# Patient Record
Sex: FEMALE | Race: WHITE | Employment: OTHER | URBAN - METROPOLITAN AREA
[De-identification: names, ages, dates, MRNs, and addresses within clinical notes are randomized per-mention and may not be internally consistent; named-entity substitution may affect disease eponyms.]

---

## 2017-01-04 ENCOUNTER — ANESTHESIA (OUTPATIENT)
Dept: GASTROENTEROLOGY | Facility: CLINIC | Age: 67
End: 2017-01-04
Payer: MEDICARE

## 2017-01-04 ENCOUNTER — SURGERY (OUTPATIENT)
Age: 67
End: 2017-01-04

## 2017-01-04 PROCEDURE — 25000125 ZZHC RX 250: Performed by: NURSE ANESTHETIST, CERTIFIED REGISTERED

## 2017-01-04 RX ORDER — PROPOFOL 10 MG/ML
INJECTION, EMULSION INTRAVENOUS CONTINUOUS PRN
Status: DISCONTINUED | OUTPATIENT
Start: 2017-01-04 | End: 2017-01-04

## 2017-01-04 RX ORDER — PROPOFOL 10 MG/ML
INJECTION, EMULSION INTRAVENOUS PRN
Status: DISCONTINUED | OUTPATIENT
Start: 2017-01-04 | End: 2017-01-04

## 2017-01-04 RX ADMIN — PROPOFOL 50 MG: 10 INJECTION, EMULSION INTRAVENOUS at 10:49

## 2017-01-04 RX ADMIN — PROPOFOL 50 MG: 10 INJECTION, EMULSION INTRAVENOUS at 10:48

## 2017-01-04 RX ADMIN — PROPOFOL 200 MCG/KG/MIN: 10 INJECTION, EMULSION INTRAVENOUS at 10:49

## 2017-01-19 ENCOUNTER — OFFICE VISIT (OUTPATIENT)
Dept: FAMILY MEDICINE | Facility: CLINIC | Age: 67
End: 2017-01-19
Payer: COMMERCIAL

## 2017-01-19 VITALS
SYSTOLIC BLOOD PRESSURE: 108 MMHG | DIASTOLIC BLOOD PRESSURE: 62 MMHG | WEIGHT: 143.6 LBS | BODY MASS INDEX: 24.52 KG/M2 | HEART RATE: 72 BPM | TEMPERATURE: 98.2 F | HEIGHT: 64 IN

## 2017-01-19 DIAGNOSIS — Z01.818 PREOP GENERAL PHYSICAL EXAM: Primary | ICD-10-CM

## 2017-01-19 DIAGNOSIS — H26.9 CATARACT: ICD-10-CM

## 2017-01-19 PROCEDURE — 99214 OFFICE O/P EST MOD 30 MIN: CPT | Performed by: NURSE PRACTITIONER

## 2017-01-19 NOTE — MR AVS SNAPSHOT
After Visit Summary   1/19/2017    Karen Hernandez    MRN: [id number]           Patient Information     Date Of Birth          1950        Visit Information        Provider Department      1/19/2017 8:20 AM Sania Sr APRN [facility]        Today's Diagnoses     Preop general physical exam    -  1     Cataract           Care Instructions      Before Your Surgery      Call your surgeon if there is any change in your health. This includes signs of a cold or flu (such as a sore throat, runny nose, cough, rash or fever).    Do not smoke, drink alcohol or take over the counter medicine (unless your surgeon or primary care doctor tells you to) for the 24 hours before and after surgery.    If you take prescribed drugs: Follow your doctor s orders about which medicines to take and which to stop until after surgery.    Eating and drinking prior to surgery: follow the instructions from your surgeon    Take a shower or bath the night before surgery. Use the soap your surgeon gave you to gently clean your skin. If you do not have soap from your surgeon, use your regular soap. Do not shave or scrub the surgery site.  Wear clean pajamas and have clean sheets on your bed.       Be well rested and well hydrated the night before surgery         Follow-ups after your visit        Who to contact     Normal or non-critical lab and imaging results will be communicated to you by [employer], letter or phone within 4 business days after the clinic has received the results. If you do not hear from us within 7 days, please contact the clinic through [employer] or phone. If you have a critical or abnormal lab result, we will notify you by phone as soon as possible.  Submit refill requests through [facility] or call your pharmacy and they will forward the refill request to us. Please allow 3 business days for your refill to be completed.          If you need to speak with a  for  "additional information , please call: [phone]           Additional Information About Your Visit        [employer] Information     [employer] gives you secure access to your electronic health record. If you see a primary care provider, you can also send messages to your care team and make appointments. If you have questions, please call your primary care clinic.  If you do not have a primary care provider, please call [phone] and they will assist you.        Care EveryWhere ID     This is your Care EveryWhere ID. This could be used by other organizations to access your [facility] medical records  [id number]        Your Vitals Were     Pulse Temperature Height BMI (Body Mass Index)          72 98.2  F (36.8  C) (Tympanic) 5' 3.75\" (1.619 m) 24.85 kg/m2         Blood Pressure from Last 3 Encounters:   01/19/17 108/62   01/04/17 100/66   12/13/16 118/70    Weight from Last 3 Encounters:   01/19/17 143 lb 9.6 oz (65.137 kg)   01/04/17 140 lb (63.504 kg)   12/13/16 140 lb 3.2 oz (63.594 kg)              Today, you had the following     No orders found for display       Primary Care Provider Office Phone # Fax #    [facility] [phone]       [address] [address]        Thank you!     Thank you for choosing [facility]  for your care. Our goal is always to provide you with excellent care. Hearing back from our patients is one way we can continue to improve our services. Please take a few minutes to complete the written survey that you may receive in the mail after your visit with us. Thank you!             Your Updated Medication List - Protect others around you: Learn how to safely use, store and throw away your medicines at www.disposemymeds.org.          This list is accurate as of: 1/19/17  8:52 AM.  Always use your most recent med list.                   Brand Name Dispense Instructions for use    clobetasol 0.05 % cream "    TEMOVATE    30 g    Apply topically 2 times daily       lisinopril 20 MG tablet    PRINIVIL/ZESTRIL    90 tablet    Take 1 tablet (20 mg) by mouth daily       pantoprazole 40 MG EC tablet    PROTONIX    90 tablet    Take 1 tablet (40 mg) by mouth daily       selenium sulfide 2.5 % lotion    SELSUN    120 mL    Apply  topically daily as needed for itching.       triamcinolone 0.1 % cream    KENALOG    15 g    Apply topically 3 times daily       trifluridine 1 % ophthalmic solution    VIROPTIC    1 Bottle    Place 1 drop into the right eye every 2 hours       VALTREX 1000 mg tablet   Generic drug:  valACYclovir     12 tablet    Take 2 tablets (2,000 mg) by mouth 2 times daily Dispense as written

## 2017-01-19 NOTE — PATIENT INSTRUCTIONS
Before Your Surgery      Call your surgeon if there is any change in your health. This includes signs of a cold or flu (such as a sore throat, runny nose, cough, rash or fever).    Do not smoke, drink alcohol or take over the counter medicine (unless your surgeon or primary care doctor tells you to) for the 24 hours before and after surgery.    If you take prescribed drugs: Follow your doctor s orders about which medicines to take and which to stop until after surgery.    Eating and drinking prior to surgery: follow the instructions from your surgeon    Take a shower or bath the night before surgery. Use the soap your surgeon gave you to gently clean your skin. If you do not have soap from your surgeon, use your regular soap. Do not shave or scrub the surgery site.  Wear clean pajamas and have clean sheets on your bed.       Be well rested and well hydrated the night before surgery

## 2017-01-19 NOTE — NURSING NOTE
"Chief Complaint   Patient presents with     Pre-Op Exam       Initial /62 mmHg  Pulse 72  Temp(Src) 98.2  F (36.8  C) (Tympanic)  Ht 5' 3.75\" (1.619 m)  Wt 143 lb 9.6 oz (65.137 kg)  BMI 24.85 kg/m2 Estimated body mass index is 24.85 kg/(m^2) as calculated from the following:    Height as of this encounter: 5' 3.75\" (1.619 m).    Weight as of this encounter: 143 lb 9.6 oz (65.137 kg).  BP completed using cuff size: saida Gaspar CMA (AAMA)      "

## 2017-01-19 NOTE — PROGRESS NOTES
[facility]  [address] [place] [id number]  [phone]  Dept: [phone]    PRE-OP EVALUATION:  Today's date: 2017    Karen Hernandez (: 1950) presents for pre-operative evaluation assessment as requested by Dr. Flor.  She requires evaluation and anesthesia risk assessment prior to undergoing surgery/procedure for treatment of cataract of left eye , and right eye .  Proposed procedure: cataract removal with IOL implant of left eye and right eye.      Date of Surgery/ Procedure: 17  Time of Surgery/ Procedure: 0630AM  Hospital/Surgical Facility: [facility]  Fax number for surgical facility:   Primary Physician: Sania Sr  Type of Anesthesia Anticipated: to be determined    Patient has a Health Care Directive or Living Will:  NO    1. NO - Do you have a history of heart attack, stroke, stent, bypass or surgery on an artery in the head, neck, heart or legs?  2. NO - Do you ever have any pain or discomfort in your chest?  3. NO - Do you have a history of  Heart Failure?  4. NO - Are you troubled by shortness of breath when: walking on the level, up a slight hill or at night?  5. NO - Do you currently have a cold, bronchitis or other respiratory infection ?  6. YES - Do you have a cough, shortness of breath or wheezing? Dry cough  7. NO - Do you sometimes get pains in the calves of your legs when you walk?  8. NO - Do you or anyone in your family have previous history of blood clots?  9. NO - Do you or does anyone in your family have a serious bleeding problem such as prolonged bleeding following surgeries or cuts?  10. NO - Have you ever had problems with anemia or been told to take iron pills?  11. NO - Have you had any abnormal blood loss such as black, tarry or bloody stools, or abnormal vaginal bleeding?  12. NO - Have you ever had a blood transfusion?  13. NO - Have you or any of your relatives ever had problems with anesthesia?  14. NO  - Do you have sleep apnea, excessive snoring or daytime drowsiness?  15. NO - Do you have any prosthetic heart valves?  16. NO - Do you have prosthetic joints?  17. NO - Is there any chance that you may be pregnant?      HPI:                                                      Brief HPI related to upcoming procedure: bilat cataract   Will be having the left eye done first .       See problem list for active medical problems.  Problems all longstanding and stable, except as noted/documented.  See ROS for pertinent symptoms related to these conditions.                                                                                                  .    MEDICAL HISTORY:                                                      Patient Active Problem List    Diagnosis Date Noted     FHX COLON CANCER - SCOPE Q 5Y 05/17/2007     Priority: High     2007 negative.        Elevated glucose 08/03/2016     Priority: Medium     Osteopenia 07/26/2016     Priority: Medium     Essential hypertension with goal blood pressure less than 140/90 06/15/2016     Priority: Medium     Gastroesophageal reflux disease with esophagitis 11/22/2015     Priority: Medium     Loss of height 08/08/2013     Priority: Medium     Dermatitis 08/08/2013     Priority: Medium     Herpes simplex of eye 05/20/2011     Priority: Medium     Recurrent cold sores 04/29/2011     Priority: Medium     AK (actinic keratosis) 04/14/2011     Priority: Medium     Melanocytic nevus 04/14/2011     Priority: Medium     (Problem list name updated by automated process. Provider to review and confirm.)       Hyperlipidemia LDL goal <130 09/29/2008     Priority: Medium     September 29, 2008 - Desires lifestyle initially.  Recheck 3-6mo.          Advanced directives,HC info letter sent 2/28/2012 02/28/2012     Priority: Low     24 hour contact handout given 02/23/2012     Priority: Low     EMERGENCY CARE PLAN  Presenting Problem Signs and Symptoms Treatment Plan    Questions or  conerns during clinic hours    I will call the clinic directly     Questions or conerns outside clinic hours    I will call the 24 hour nurse line at [phone]    Patient needs to schedule an appointment    I will call the 24 hour scheduling team at [phone] or clinic directly    Same day treatment     I will call the clinic first, nurse line if after hours, urgent care and express care if needed             Past Medical History   Diagnosis Date     NO ACTIVE PROBLEMS      AK (actinic keratosis) 4/14/2011     Moles 4/14/2011     FHX COLON CANCER - SCOPE Q 5Y 5/17/2007 2007 negative.      Hyperlipidemia LDL goal <130 9/29/2008 September 29, 2008 - Desires lifestyle initially.  Recheck 3-6mo.       Herpes simplex of eye 5/20/2011     GERD (gastroesophageal reflux disease) 5/20/2011     Past Surgical History   Procedure Laterality Date     C appendectomy  1960     Tonsillectomy  1958     Hysterectomy, pap no longer indicated  2000     Ovaries left     Colonoscopy  8/10/2012     Procedure: COLONOSCOPY;  Colonoscopy;  Surgeon: Carlos Alatorre MD;  Location: [facility]     Colonoscopy N/A 1/4/2017     Procedure: COLONOSCOPY;  Surgeon: Stepan Samayoa MD;  Location: [facility]     Current Outpatient Prescriptions   Medication Sig Dispense Refill     lisinopril (PRINIVIL,ZESTRIL) 20 MG tablet Take 1 tablet (20 mg) by mouth daily 90 tablet 3     pantoprazole (PROTONIX) 40 MG enteric coated tablet Take 1 tablet (40 mg) by mouth daily 90 tablet 3     triamcinolone (KENALOG) 0.1 % cream Apply topically 3 times daily 15 g 0     selenium sulfide (SELSUN) 2.5 % shampoo Apply  topically daily as needed for itching. 120 mL 0     trifluridine (VIROPTIC) 1 % ophthalmic solution Place 1 drop into the right eye every 2 hours 1 Bottle 0     VALTREX 1 G tablet Take 2 tablets (2,000 mg) by mouth 2 times daily Dispense as written 12 tablet 1     clobetasol (TEMOVATE) 0.05 % cream Apply topically 2 times daily 30 g 0  "    OTC products: no recent use of OTC ASA, NSAIDS or Steroids    Allergies   Allergen Reactions     Nkda [No Known Drug Allergies]       Latex Allergy: NO    Social History   Substance Use Topics     Smoking status: Never Smoker      Smokeless tobacco: Never Used     Alcohol Use: Yes      Comment: Social-- 6/wk     History   Drug Use No       REVIEW OF SYSTEMS:                                                    C: NEGATIVE for fever, chills, change in weight  E/M: NEGATIVE for ear, mouth and throat problems  R: NEGATIVE for significant cough or SOB  CV: NEGATIVE for chest pain, palpitations or peripheral edema  GI: NEGATIVE for nausea, abdominal pain, heartburn, or change in bowel habits  : no  UTI symptoms   NEURO: NEGATIVE for weakness, dizziness or paresthesias  PSYCHIATRIC: NEGATIVE for changes in mood or affect    EXAM:                                                    /62 mmHg  Pulse 72  Temp(Src) 98.2  F (36.8  C) (Tympanic)  Ht 5' 3.75\" (1.619 m)  Wt 143 lb 9.6 oz (65.137 kg)  BMI 24.85 kg/m2  GENERAL APPEARANCE: healthy, alert and no distress  EYES: Eyes grossly normal to inspection, PERRL, conjunctivae and sclerae normal and eyes track well;   HENT: ear canals and TM's normal, nose and mouth without ulcers or lesions, oral mucous membranes moist, oropharynx clear   No sinus pain and teeth in good repair   RESP: lungs clear to auscultation - no rales, rhonchi or wheezes  CV: regular rate and rhythm, normal S1 S2, no S3 or S4 and no murmur, click or rub   ABDOMEN: soft, nontender, no HSM or masses and bowel sounds normal  SKIN: no suspicious lesions or rashes  NEURO: Normal strength and tone, mentation intact, speech normal, cranial nerves 2-12 intact, Romberg negative, rapid alternating movements normal, proprioception normal and normal strength throughout  PSYCH: mentation appears normal and affect normal/bright    DIAGNOSTICS:                                                    No labs or " EKG required for low risk surgery (cataract, skin procedure, breast biopsy, etc)    Recent Labs   Lab Test  07/26/16   0858  08/25/14   0839  08/08/13   0830   06/08/12   1234  05/20/11   1054  10/15/09   0850   HGB   --    --    --    --   14.3  13.3  13.8   PLT   --    --    --    --   243   --   258   NA  142   --   142   < >  143  142  142   POTASSIUM  4.1   --   4.6   < >  4.4  4.5  4.1   CR  0.77   --   0.70   < >  0.76  0.75  0.81   A1C   --   5.8   --    --    --    --    --     < > = values in this interval not displayed.        IMPRESSION:                                                    Reason for surgery/procedure:  treatment of cataract of left eye , and right eye .  Proposed procedure: cataract removal with IOL implant of left eye and right eye.        The proposed surgical procedure is considered LOW risk.    REVISED CARDIAC RISK INDEX  The patient has the following serious cardiovascular risks for perioperative complications such as (MI, PE, VFib and 3  AV Block):  No serious cardiac risks  INTERPRETATION: 0 risks: Class I (very low risk - 0.4% complication rate)    The patient has the following additional risks for perioperative complications:  No identified additional risks    ASSESSMENT/PLAN:      ICD-10-CM    1. Preop general physical exam Z01.818    2. Cataract H26.9        Patient Instructions     Before Your Surgery      Call your surgeon if there is any change in your health. This includes signs of a cold or flu (such as a sore throat, runny nose, cough, rash or fever).    Do not smoke, drink alcohol or take over the counter medicine (unless your surgeon or primary care doctor tells you to) for the 24 hours before and after surgery.    If you take prescribed drugs: Follow your doctor s orders about which medicines to take and which to stop until after surgery.    Eating and drinking prior to surgery: follow the instructions from your surgeon    Take a shower or bath the night before  surgery. Use the soap your surgeon gave you to gently clean your skin. If you do not have soap from your surgeon, use your regular soap. Do not shave or scrub the surgery site.  Wear clean pajamas and have clean sheets on your bed.       Be well rested and well hydrated the night before surgery               RECOMMENDATIONS:                                                          --Patient is to take all scheduled medications on the day of surgery EXCEPT for modifications listed below.    APPROVAL GIVEN to proceed with proposed procedure, without further diagnostic evaluation       Signed Electronically by: AGUS BARR NP, APRN CNP    Copy of this evaluation report is provided to requesting physician.    [facility] Preop Guidelines

## 2017-02-27 ENCOUNTER — OFFICE VISIT (OUTPATIENT)
Dept: FAMILY MEDICINE | Facility: CLINIC | Age: 67
End: 2017-02-27
Payer: COMMERCIAL

## 2017-02-27 VITALS — HEART RATE: 100 BPM | DIASTOLIC BLOOD PRESSURE: 62 MMHG | SYSTOLIC BLOOD PRESSURE: 116 MMHG | TEMPERATURE: 98.6 F

## 2017-02-27 DIAGNOSIS — R05.9 COUGH: ICD-10-CM

## 2017-02-27 DIAGNOSIS — D22.5 MELANOCYTIC NEVUS OF TRUNK: Primary | ICD-10-CM

## 2017-02-27 DIAGNOSIS — L82.1 KERATOSIS, SEBORRHEIC: ICD-10-CM

## 2017-02-27 PROCEDURE — 17110 DESTRUCTION B9 LES UP TO 14: CPT | Performed by: NURSE PRACTITIONER

## 2017-02-27 PROCEDURE — 11100 ZZHC BIOPSY SKIN/SUBQ/MUC MEM, SINGLE LESION: CPT | Mod: 59 | Performed by: NURSE PRACTITIONER

## 2017-02-27 PROCEDURE — 88305 TISSUE EXAM BY PATHOLOGIST: CPT | Performed by: NURSE PRACTITIONER

## 2017-02-27 PROCEDURE — 99213 OFFICE O/P EST LOW 20 MIN: CPT | Mod: 25 | Performed by: NURSE PRACTITIONER

## 2017-02-27 NOTE — PROGRESS NOTES
SUBJECTIVE:                                                    Karen Hernandez is a 66 year old female who presents to clinic today for the following health issues:      Does have multiple keratosis and 1 mole on the upper right back    On the upper abdomin    Does have a harsh cough has had off and on for 3 months.  Will happen about every day it is very sporadic. Does have hx of  Reflux . Has been eating more causative foods.            Problem list and histories reviewed & adjusted, as indicated.  Additional history: as documented    Patient Active Problem List   Diagnosis     FHX COLON CANCER - SCOPE Q 5Y     Hyperlipidemia LDL goal <130     AK (actinic keratosis)     Melanocytic nevus     Recurrent cold sores     Herpes simplex of eye     24 hour contact handout given     Advanced directives,HC info letter sent 2/28/2012     Loss of height     Dermatitis     Gastroesophageal reflux disease with esophagitis     Essential hypertension with goal blood pressure less than 140/90     Osteopenia     Elevated glucose     Past Surgical History   Procedure Laterality Date     C appendectomy  1960     Tonsillectomy  1958     Hysterectomy, pap no longer indicated  2000     Ovaries left     Colonoscopy  8/10/2012     Procedure: COLONOSCOPY;  Colonoscopy;  Surgeon: Carlos Alatorre MD;  Location: [facility]     Colonoscopy N/A 1/4/2017     Procedure: COLONOSCOPY;  Surgeon: Stepan Samayoa MD;  Location: [facility]     Eye surgery  2017       Social History   Substance Use Topics     Smoking status: Never Smoker     Smokeless tobacco: Never Used     Alcohol use Yes      Comment: Social-- 6/wk     Family History   Problem Relation Age of Onset     Cancer - colorectal Father      CANCER Daughter      Breast Cancer Daughter      DIABETES No family hx of      Hypertension No family hx of      CEREBROVASCULAR DISEASE No family hx of      Prostate Cancer No family hx of          Current Outpatient Prescriptions   Medication  Sig Dispense Refill     lisinopril (PRINIVIL,ZESTRIL) 20 MG tablet Take 1 tablet (20 mg) by mouth daily 90 tablet 3     pantoprazole (PROTONIX) 40 MG enteric coated tablet Take 1 tablet (40 mg) by mouth daily 90 tablet 3     triamcinolone (KENALOG) 0.1 % cream Apply topically 3 times daily 15 g 0     clobetasol (TEMOVATE) 0.05 % cream Apply topically 2 times daily 30 g 0     selenium sulfide (SELSUN) 2.5 % shampoo Apply  topically daily as needed for itching. 120 mL 0     VALTREX 1 G tablet Take 2 tablets (2,000 mg) by mouth 2 times daily Dispense as written (Patient not taking: Reported on 2/27/2017) 12 tablet 1     Allergies   Allergen Reactions     Nkda [No Known Drug Allergies]      BP Readings from Last 3 Encounters:   02/27/17 116/62   01/19/17 108/62   01/04/17 100/66    Wt Readings from Last 3 Encounters:   01/19/17 143 lb 9.6 oz (65.1 kg)   01/04/17 140 lb (63.5 kg)   12/13/16 140 lb 3.2 oz (63.6 kg)                    ROS:  C: NEGATIVE for fever, chills, change in weight  INTEGUMENTARY/SKIN: POSITIVE for 4 keratosis, and 1 mole on the right upper back    E/M: POSITIVE  for ear, mouth and throat problems, will have a cough   R: NEGATIVE for significant cough or SOB  CV: NEGATIVE for chest pain, palpitations or peripheral edema    OBJECTIVE:                                                    /62  Pulse 100  Temp 98.6  F (37  C) (Tympanic)  There is no height or weight on file to calculate BMI.  GENERAL: healthy, alert and no distress  HENT: ear canals and TM's normal, nose and mouth without ulcers or lesions, oropharynx clear, oral mucous membranes moist and the uvula is erythemic and mildly edematous   NECK: no adenopathy, no asymmetry, masses, or scars and thyroid normal to palpation  RESP: lungs clear to auscultation - no rales, rhonchi or wheezes  CV: regular rate and rhythm, normal S1 S2, no S3 or S4, no murmur, click or rub, no peripheral edema and peripheral pulses strong  SKIN: 1 mole - upper  back  3 mm .   Pt. Concerned with color changes. Mole is june in color.  3 mm. symmetrical and keratoses - seborrheic # 4 - frozen one on the lower left upper arm,  4 mm keratosis,  Abdomin 8 mm keratosis, right side back by the bra line 2 keratosis one was  4 mm and the other was  8 mm just lateral .  All were treated with  3 freeze/thaw cycles . Pt tolerated. Well     Mole on the right upper back  3 mm .  Pt requesting it to be removed.  Reviewed procedure and pt agreed. The skin was cleansed with betadine, was anesthetized with 1% lido without epi  With sterile  Technique , 3 mm punch biopsy technique was used to remove the mole and it was immediatly placed in formalin . The skin was cleansed and a steri strips were placed , and dressing placed     Diagnostic Test Results:  none      ASSESSMENT/PLAN:                                                      ASSESSMENT/PLAN:      ICD-10-CM    1. Melanocytic nevus of trunk D22.5 Surgical pathology exam   2. Cough R05 OTOLARYNGOLOGY REFERRAL   3. Keratosis, seborrheic L82.1 DESTRUCT BENIGN LESION, UP TO 14       Patient Instructions   Steri- stirps off in  5 days.   Avoid that are during  Shower.     For the throat.    Gargle with warm salt water   For the sore throat use warm tea and honey or even just spoonful of honey and hold it to the back of the throat. This will help to decrease the inflammation and thin the mucous.     ENT if needed                            CONSULTATION/REFERRAL to ENT if needed   See Patient Instructions    AGUS BARR NP, APRN [facility]

## 2017-02-27 NOTE — PATIENT INSTRUCTIONS
Steri- stirps off in  5 days.   Avoid that are during  Shower.     For the throat.    Gargle with warm salt water   For the sore throat use warm tea and honey or even just spoonful of honey and hold it to the back of the throat. This will help to decrease the inflammation and thin the mucous.     ENT if needed

## 2017-02-27 NOTE — MR AVS SNAPSHOT
After Visit Summary   2/27/2017    Karen Hernandez    MRN: [id number]           Patient Information     Date Of Birth          1950        Visit Information        Provider Department      2/27/2017 3:00 PM Sania Sr APRN [facility]        Today's Diagnoses     Melanocytic nevus of trunk    -  1    Cough          Care Instructions    Steri- stirps off in  5 days.   Avoid that are during  Shower.     For the throat.    Gargle with warm salt water   For the sore throat use warm tea and honey or even just spoonful of honey and hold it to the back of the throat. This will help to decrease the inflammation and thin the mucous.     ENT if needed           Follow-ups after your visit        Additional Services     OTOLARYNGOLOGY REFERRAL       Your provider has referred you to: FMG: [facility] [phone]   [email or web address]  [facility]: [facility] (Otol[facility]ology) [facility] [phone]  [email or web address]/ear-[email or web address]-throat-[email or web address]/  UMP: [facility] [phone]   [email or web address]://[email or web address]/  N: [facility] [phone] [email or web address]    Please be aware that coverage of these services is subject to the terms and limitations of your health insurance plan.  Call member services at your health plan with any benefit or coverage questions.      Please bring the following with you to your appointment:    (1) Any X-Rays, CTs or MRIs which have been performed.  Contact the facility where they were done to arrange for  prior to your scheduled appointment.   (2) List of current medications  (3) This referral request   (4) Any documents/labs given to you for this referral                  Your next 10 appointments already scheduled     Feb 28, 2017 11:15 AM RAFAEL KHALIL  SCREENING DIGITAL BILATERAL with [facility]   [facility] ([facility])    [address]  [facility] [address] [phone]           Do not use any powder, lotion or deodorant under your arms or on your breast. If you do, we will ask you to remove it before your exam.  Wear comfortable, two-piece clothing.  If you have any allergies, tell your care team.  Bring any previous mammograms from other facilities or have them mailed to the breast center.              Who to contact     Normal or non-critical lab and imaging results will be communicated to you by prettysecretshart, letter or phone within 4 business days after the clinic has received the results. If you do not hear from us within 7 days, please contact the clinic through [employer] or phone. If you have a critical or abnormal lab result, we will notify you by phone as soon as possible.  Submit refill requests through Play for Job or call your pharmacy and they will forward the refill request to us. Please allow 3 business days for your refill to be completed.          If you need to speak with a  for additional information , please call: [phone]           Additional Information About Your Visit        Play for Job Information     Play for Job gives you secure access to your electronic health record. If you see a primary care provider, you can also send messages to your care team and make appointments. If you have questions, please call your primary care clinic.  If you do not have a primary care provider, please call [phone] and they will assist you.        Care EveryWhere ID     This is your Care EveryWhere ID. This could be used by other organizations to access your [place] medical records  [id number]        Your Vitals Were     Pulse Temperature                100 98.6  F (37  C) (Tympanic)           Blood Pressure from Last 3 Encounters:   02/27/17 116/62   01/19/17 108/62   01/04/17 100/66    Weight from Last 3  Encounters:   01/19/17 143 lb 9.6 oz (65.1 kg)   01/04/17 140 lb (63.5 kg)   12/13/16 140 lb 3.2 oz (63.6 kg)              We Performed the Following     OTOLARYNGOLOGY REFERRAL        Primary Care Provider Office Phone # Fax #    Sania Moralai Sr APRJAYSON SIMEON [phone]       [facility] [address] [place] [address]        Thank you!     Thank you for choosing [facility]  for your care. Our goal is always to provide you with excellent care. Hearing back from our patients is one way we can continue to improve our services. Please take a few minutes to complete the written survey that you may receive in the mail after your visit with us. Thank you!             Your Updated Medication List - Protect others around you: Learn how to safely use, store and throw away your medicines at www.disposemymeds.org.          This list is accurate as of: 2/27/17  3:51 PM.  Always use your most recent med list.                   Brand Name Dispense Instructions for use    clobetasol 0.05 % cream    TEMOVATE    30 g    Apply topically 2 times daily       lisinopril 20 MG tablet    PRINIVIL/ZESTRIL    90 tablet    Take 1 tablet (20 mg) by mouth daily       pantoprazole 40 MG EC tablet    PROTONIX    90 tablet    Take 1 tablet (40 mg) by mouth daily       selenium sulfide 2.5 % lotion    SELSUN    120 mL    Apply  topically daily as needed for itching.       triamcinolone 0.1 % cream    KENALOG    15 g    Apply topically 3 times daily       VALTREX 1000 mg tablet   Generic drug:  valACYclovir     12 tablet    Take 2 tablets (2,000 mg) by mouth 2 times daily Dispense as written

## 2017-02-27 NOTE — NURSING NOTE
"Initial /62  Pulse 100  Temp 98.6  F (37  C) (Tympanic) Estimated body mass index is 24.84 kg/(m^2) as calculated from the following:    Height as of 1/19/17: 5' 3.75\" (1.619 m).    Weight as of 1/19/17: 143 lb 9.6 oz (65.1 kg). .    "

## 2017-02-28 ENCOUNTER — HOSPITAL ENCOUNTER (OUTPATIENT)
Dept: MAMMOGRAPHY | Facility: CLINIC | Age: 67
Discharge: HOME | End: 2017-02-28
Attending: NURSE PRACTITIONER | Admitting: NURSE PRACTITIONER
Payer: MEDICARE

## 2017-02-28 DIAGNOSIS — Z12.31 VISIT FOR SCREENING MAMMOGRAM: ICD-10-CM

## 2017-02-28 PROCEDURE — G0202 SCR MAMMO BI INCL CAD: HCPCS

## 2017-03-02 LAB — COPATH REPORT: NORMAL

## 2017-04-03 ENCOUNTER — OFFICE VISIT (OUTPATIENT)
Dept: OTOLARYNGOLOGY | Facility: CLINIC | Age: 67
End: 2017-04-03
Payer: COMMERCIAL

## 2017-04-03 VITALS
DIASTOLIC BLOOD PRESSURE: 87 MMHG | SYSTOLIC BLOOD PRESSURE: 141 MMHG | BODY MASS INDEX: 25.26 KG/M2 | WEIGHT: 146 LBS | TEMPERATURE: 98.2 F

## 2017-04-03 DIAGNOSIS — R05.3 CHRONIC COUGH: Primary | ICD-10-CM

## 2017-04-03 DIAGNOSIS — K21.9 LPRD (LARYNGOPHARYNGEAL REFLUX DISEASE): ICD-10-CM

## 2017-04-03 PROCEDURE — 99214 OFFICE O/P EST MOD 30 MIN: CPT | Mod: 25 | Performed by: OTOLARYNGOLOGY

## 2017-04-03 PROCEDURE — 31575 DIAGNOSTIC LARYNGOSCOPY: CPT | Performed by: OTOLARYNGOLOGY

## 2017-04-03 RX ORDER — CLOBETASOL PROPIONATE 0.5 MG/G
CREAM TOPICAL 2 TIMES DAILY
COMMUNITY
End: 2018-02-05

## 2017-04-03 ASSESSMENT — PAIN SCALES - GENERAL: PAINLEVEL: NO PAIN (0)

## 2017-04-03 NOTE — Clinical Note
Cristal Lui, I asked Karen to stop her Lisinipril to see if this helps her cough. Could you please suggest an alternative for her to try during the cough workup?  Thanks,  Deyanira

## 2017-04-03 NOTE — MR AVS SNAPSHOT
After Visit Summary   4/3/2017    Karen Hernandez    MRN: [id number]           Patient Information     Date Of Birth          1950        Visit Information        Provider Department      4/3/2017 11:00 AM Deyanira Coleman MD [facility]        Today's Diagnoses     Chronic cough    -  1    LPRD (laryngopharyngeal reflux disease)          Care Instructions    Per physician's instructions          Follow-ups after your visit        Future tests that were ordered for you today     Open Future Orders        Priority Expected Expires Ordered    CT Maxillofacial w/o Contrast Routine  4/3/2018 4/3/2017    XR Chest 2 Views Routine 4/3/2017 4/3/2018 4/3/2017            Who to contact     If you have questions or need follow up information about today's clinic visit or your schedule please contact [facility] directly at [phone].  Normal or non-critical lab and imaging results will be communicated to you by PillGuardhart, letter or phone within 4 business days after the clinic has received the results. If you do not hear from us within 7 days, please contact the clinic through PillGuardhart or phone. If you have a critical or abnormal lab result, we will notify you by phone as soon as possible.  Submit refill requests through zoidu or call your pharmacy and they will forward the refill request to us. Please allow 3 business days for your refill to be completed.          Additional Information About Your Visit        MyChart Information     zoidu gives you secure access to your electronic health record. If you see a primary care provider, you can also send messages to your care team and make appointments. If you have questions, please call your primary care clinic.  If you do not have a primary care provider, please call [phone] and they will assist you.        Care EveryWhere ID     This is your Care EveryWhere ID. This could be used by other organizations to access your [place]  medical records  [id number]        Your Vitals Were     Temperature BMI (Body Mass Index)                98.2  F (36.8  C) (Oral) 25.26 kg/m2           Blood Pressure from Last 3 Encounters:   04/03/17 141/87   02/27/17 116/62   01/19/17 108/62    Weight from Last 3 Encounters:   04/03/17 66.2 kg (146 lb)   01/19/17 65.1 kg (143 lb 9.6 oz)   01/04/17 63.5 kg (140 lb)              We Performed the Following     LARYNGOSCOPY FLEX FIBEROPTIC, DIAGNOSTIC        Primary Care Provider Office Phone # Fax #    Sania Eugenia ABA [facility] [phone]       [address] [address]        Thank you!     Thank you for choosing [facility]  for your care. Our goal is always to provide you with excellent care. Hearing back from our patients is one way we can continue to improve our services. Please take a few minutes to complete the written survey that you may receive in the mail after your visit with us. Thank you!             Your Updated Medication List - Protect others around you: Learn how to safely use, store and throw away your medicines at www.disposemymeds.org.          This list is accurate as of: 4/3/17  4:06 PM.  Always use your most recent med list.                   Brand Name Dispense Instructions for use    clobetasol 0.05 % cream    TEMOVATE     Apply topically 2 times daily       lisinopril 20 MG tablet    PRINIVIL/ZESTRIL    90 tablet    Take 1 tablet (20 mg) by mouth daily       pantoprazole 40 MG EC tablet    PROTONIX    90 tablet    Take 1 tablet (40 mg) by mouth daily       selenium sulfide 2.5 % lotion    SELSUN    120 mL    Apply  topically daily as needed for itching.       triamcinolone 0.1 % cream    KENALOG    15 g    Apply topically 3 times daily       VALTREX 1000 mg tablet   Generic drug:  valACYclovir     12 tablet    Take 2 tablets (2,000 mg) by mouth 2 times daily Dispense as written

## 2017-04-03 NOTE — PROGRESS NOTES
History of Present Illness - Karen Hernandez is a 67 year old female who presents with a 5 month history of a dry cough. She denies trouble catching her breath or trouble with dyspnea on exertion. The cough seems to be worse with singing, and she sings frequently as a . She does have reflux disease and takes Prilosec, which seems to control her heartburn well.    Past Medical History -   Patient Active Problem List   Diagnosis     FHX COLON CANCER - SCOPE Q 5Y     Hyperlipidemia LDL goal <130     AK (actinic keratosis)     Melanocytic nevus     Recurrent cold sores     Herpes simplex of eye     24 hour contact handout given     Advanced directives,HC info letter sent 2/28/2012     Loss of height     Dermatitis     Gastroesophageal reflux disease with esophagitis     Essential hypertension with goal blood pressure less than 140/90     Osteopenia     Elevated glucose     Keratosis, seborrheic       Current Medications -   Current Outpatient Prescriptions:      clobetasol (TEMOVATE) 0.05 % cream, Apply topically 2 times daily, Disp: , Rfl:      lisinopril (PRINIVIL,ZESTRIL) 20 MG tablet, Take 1 tablet (20 mg) by mouth daily, Disp: 90 tablet, Rfl: 3     pantoprazole (PROTONIX) 40 MG enteric coated tablet, Take 1 tablet (40 mg) by mouth daily, Disp: 90 tablet, Rfl: 3     triamcinolone (KENALOG) 0.1 % cream, Apply topically 3 times daily, Disp: 15 g, Rfl: 0     selenium sulfide (SELSUN) 2.5 % shampoo, Apply  topically daily as needed for itching., Disp: 120 mL, Rfl: 0     VALTREX 1 G tablet, Take 2 tablets (2,000 mg) by mouth 2 times daily Dispense as written (Patient not taking: Reported on 2/27/2017), Disp: 12 tablet, Rfl: 1    Allergies -   Allergies   Allergen Reactions     Nkda [No Known Drug Allergies]        Social History -   Social History     Social History     Marital status:      Spouse name: Marko Hernandez     Number of children: 3     Years of education: N/A     Occupational History       [employer]     Social History Main Topics     Smoking status: Never Smoker     Smokeless tobacco: Never Used     Alcohol use Yes      Comment: Social-- 6/wk     Drug use: No     Sexual activity: Yes     Partners: Male     Birth control/ protection: Surgical     Other Topics Concern     Parent/Sibling W/ Cabg, Mi Or Angioplasty Before 65f 55m? No     Social History Narrative       Family History -   Family History   Problem Relation Age of Onset     Cancer - colorectal Father      CANCER Daughter      Breast Cancer Daughter      Congenital Anomalies Daughter      trisomy 18     DIABETES No family hx of      Hypertension No family hx of      CEREBROVASCULAR DISEASE No family hx of      Prostate Cancer No family hx of        Review of Systems - As per HPI and PMHx, otherwise 7 system review of the head and neck negative. 10+ system review negative.    Exam:  /87 (BP Location: Left arm, Patient Position: Chair, Cuff Size: Adult Regular)  Temp 98.2  F (36.8  C) (Oral)  Wt 66.2 kg (146 lb)  BMI 25.26 kg/m2  General - The patient is well nourished and well developed, and appears to have good nutritional status.  Alert and oriented to person and place, answers questions and cooperates with examination appropriately.   Head and Face - Normocephalic and atraumatic, with no gross asymmetry noted of the contour of the facial features.  The facial nerve is intact, with strong symmetric movements.  Eyes - Extraocular movements intact.  Sclera were not icteric or injected, conjunctiva were pink and moist.    Procedure: Flexible Endoscopy  Indication: chronic cough    Attempts at mirror laryngoscopy were not possible due to gag reflex.  Therefore I proceeded with a fiberoptic examination.  First I sprayed both sides of the nose with a mixture of lidocaine and neosynephrine.  I then passed the scope through the nasal cavity.  The nasal cavity was unremarkable.  The nasopharynx was mucosally covered and  symmetric.  The Eustachian tube openings were unobstructed.  Going further down I had a clear view of the base of tongue which had normal appearing lingual tonsillar tissue.  The base of tongue was free of lesions, and the vallecula was open.  The epiglottis was smooth and mucosally covered.  The supraglottic larynx was then clearly visualized.  The vocal cords moved smoothly and symmetrically, they were pearly white and no lesions were seen.  The pyriform sinuses were open, and the limited view of the postcricoid region did not show any lesions.      A/P - Karen Hernandez is a 67 year old female with a chronic cough. I advised her to stop her lisinipril, and will reach out to her PCP to perhaps suggest an alternative, as lisinipril is a common cause of chronic cough. I have also ordered CXR and CT Sinus to evaluate for possible pulmonary disease or chronic sinusitis. She will return to review these after they are completed.    Adult lifestyle changes to prevent LPR reviewed      Avoid eating and drinking within two to three hours prior to bedtime    Do not drink alcohol    Eat small meals and slowly    Limit problem foods:    o Caffeine  o Carbonated drinks  o Chocolate  o Peppermint  o Tomato  o Citrus fruits  o Fatty and fried foods      Lose weight    Quit smoking    Wear loose clothing        Dr. Deyanira Coleman MD  Otolaryngology  [facility]

## 2017-04-03 NOTE — NURSING NOTE
"Initial /87 (BP Location: Left arm, Patient Position: Chair, Cuff Size: Adult Regular)  Temp 98.2  F (36.8  C) (Oral)  Wt 66.2 kg (146 lb)  BMI 25.26 kg/m2 Estimated body mass index is 25.26 kg/(m^2) as calculated from the following:    Height as of 1/19/17: 1.619 m (5' 3.75\").    Weight as of this encounter: 66.2 kg (146 lb). .    Aleja Hoyos LPN    "

## 2017-08-04 DIAGNOSIS — I10 ESSENTIAL HYPERTENSION WITH GOAL BLOOD PRESSURE LESS THAN 140/90: ICD-10-CM

## 2017-08-04 DIAGNOSIS — K21.00 GASTROESOPHAGEAL REFLUX DISEASE WITH ESOPHAGITIS: ICD-10-CM

## 2017-08-04 NOTE — TELEPHONE ENCOUNTER
Lisinopril  20mg      Last Written Prescription Date: 07/26/2016 #90 x 3  Last filled 05/02/2017  Last Office Visit with [facility], [facility] or [facility] prescribing provider: 02/27/207  CHER Sr       Potassium   Date Value Ref Range Status   07/26/2016 4.1 3.4 - 5.3 mmol/L Final     Creatinine   Date Value Ref Range Status   07/26/2016 0.77 0.52 - 1.04 mg/dL Final     BP Readings from Last 3 Encounters:   04/03/17 141/87   02/27/17 116/62   01/19/17 108/62     Pantoprazole DR 40mg      Last Written Prescription Date: 07/14/2016 #90 x 3  Last filled 05/02/2017  Last Office Visit with [facility], [facility] or [facility] prescribing provider: 02/27/2017 CHER Sr

## 2017-08-04 NOTE — TELEPHONE ENCOUNTER
Routing refill request to provider for review/approval because:  Labs not current:  Potassium, creatinine  BP elevated    Katelyn Almanzar RN

## 2017-08-06 RX ORDER — PANTOPRAZOLE SODIUM 40 MG/1
TABLET, DELAYED RELEASE ORAL
Qty: 90 TABLET | Refills: 3 | Status: SHIPPED | OUTPATIENT
Start: 2017-08-06 | End: 2018-08-02

## 2017-08-06 RX ORDER — LISINOPRIL 20 MG/1
TABLET ORAL
Qty: 90 TABLET | Refills: 3 | Status: SHIPPED | OUTPATIENT
Start: 2017-08-06 | End: 2018-02-05 | Stop reason: SINTOL

## 2017-10-17 ENCOUNTER — OFFICE VISIT (OUTPATIENT)
Dept: FAMILY MEDICINE | Facility: CLINIC | Age: 67
End: 2017-10-17
Payer: COMMERCIAL

## 2017-10-17 VITALS
TEMPERATURE: 98.2 F | SYSTOLIC BLOOD PRESSURE: 128 MMHG | BODY MASS INDEX: 25.36 KG/M2 | WEIGHT: 146.6 LBS | DIASTOLIC BLOOD PRESSURE: 76 MMHG | HEART RATE: 72 BPM

## 2017-10-17 DIAGNOSIS — Z71.89 ADVANCED DIRECTIVES, COUNSELING/DISCUSSION: ICD-10-CM

## 2017-10-17 DIAGNOSIS — Z23 NEED FOR PROPHYLACTIC VACCINATION AND INOCULATION AGAINST INFLUENZA: ICD-10-CM

## 2017-10-17 DIAGNOSIS — L91.8 SKIN TAG: ICD-10-CM

## 2017-10-17 DIAGNOSIS — Z78.0 POSTMENOPAUSAL STATUS: Primary | ICD-10-CM

## 2017-10-17 DIAGNOSIS — B00.1 RECURRENT COLD SORES: ICD-10-CM

## 2017-10-17 DIAGNOSIS — Z11.59 NEED FOR HEPATITIS C SCREENING TEST: ICD-10-CM

## 2017-10-17 LAB — HCV AB SERPL QL IA: NONREACTIVE

## 2017-10-17 PROCEDURE — 90662 IIV NO PRSV INCREASED AG IM: CPT | Performed by: NURSE PRACTITIONER

## 2017-10-17 PROCEDURE — 36415 COLL VENOUS BLD VENIPUNCTURE: CPT | Performed by: NURSE PRACTITIONER

## 2017-10-17 PROCEDURE — G0008 ADMIN INFLUENZA VIRUS VAC: HCPCS | Performed by: NURSE PRACTITIONER

## 2017-10-17 PROCEDURE — 11200 RMVL SKIN TAGS UP TO&INC 15: CPT | Performed by: NURSE PRACTITIONER

## 2017-10-17 PROCEDURE — 86803 HEPATITIS C AB TEST: CPT | Performed by: NURSE PRACTITIONER

## 2017-10-17 RX ORDER — VALACYCLOVIR HCL 1000 MG
2000 TABLET ORAL 2 TIMES DAILY
Qty: 12 TABLET | Refills: 1 | Status: SHIPPED | OUTPATIENT
Start: 2017-10-17 | End: 2020-03-01

## 2017-10-17 NOTE — PROGRESS NOTES
Injectable Influenza Immunization Documentation    1.  Is the person to be vaccinated sick today?   No    2. Does the person to be vaccinated have an allergy to a component   of the vaccine?   No    3. Has the person to be vaccinated ever had a serious reaction   to influenza vaccine in the past?   No    4. Has the person to be vaccinated ever had Guillain-Barré syndrome?   No    Form completed by Elvira Gaspar CMA ([facility])

## 2017-10-17 NOTE — MR AVS SNAPSHOT
After Visit Summary   10/17/2017    Karen Hernandez    MRN: [id number]           Patient Information     Date Of Birth          1950        Visit Information        Provider Department      10/17/2017 10:20 AM Sania Sr APRN [facility]        Today's Diagnoses     Postmenopausal status    -  1    Need for hepatitis C screening test        Recurrent cold sores        Skin tag          Care Instructions    Watch for any signs of infection.      you did get the flu shot today            Follow-ups after your visit        Future tests that were ordered for you today     Open Future Orders        Priority Expected Expires Ordered    DX Hip/Pelvis/Spine Routine  10/17/2018 10/17/2017            Who to contact     Normal or non-critical lab and imaging results will be communicated to you by Executive [employer]ershart, letter or phone within 4 business days after the clinic has received the results. If you do not hear from us within 7 days, please contact the clinic through [employer] or phone. If you have a critical or abnormal lab result, we will notify you by phone as soon as possible.  Submit refill requests through paOnde or call your pharmacy and they will forward the refill request to us. Please allow 3 business days for your refill to be completed.          If you need to speak with a  for additional information , please call: [phone]           Additional Information About Your Visit        Exec[employer] Information     paOnde gives you secure access to your electronic health record. If you see a primary care provider, you can also send messages to your care team and make appointments. If you have questions, please call your primary care clinic.  If you do not have a primary care provider, please call [phone] and they will assist you.        Care EveryWhere ID     This is your Care EveryWhere ID. This could be used by other organizations to access your [place]  medical records  [id number]        Your Vitals Were     Pulse Temperature BMI (Body Mass Index)             72 98.2  F (36.8  C) (Tympanic) 25.36 kg/m2          Blood Pressure from Last 3 Encounters:   10/17/17 128/76   04/03/17 141/87   02/27/17 116/62    Weight from Last 3 Encounters:   10/17/17 146 lb 9.6 oz (66.5 kg)   04/03/17 146 lb (66.2 kg)   01/19/17 143 lb 9.6 oz (65.1 kg)              We Performed the Following     **Hepatitis C Screen Reflex to RNA FUTURE anytime          Where to get your medicines      These medications were sent to [place] [id number] [place] [facility] [address] [facility]  [address] [address]     Phone:  [phone]     VALTREX 1000 mg tablet          Primary Care Provider Office Phone # Fax #    Sania Sr, APRN [facility] [phone] [address] [facility]   [facility] [address]        Equal Access to Services     [facility]: Hadii aad ku hadasho Soomaali, waaxda luqadaha, qaybta kaalmada adeegyaleonel, carlos alberto payton . So [facility] [phone].    ATENCIÓN: Si habla español, tiene a gallagher disposición servicios gratuitos de asistencia lingüística. Llame al [phone].    We comply with applicable federal civil rights laws and [place] laws. We do not discriminate on the basis of race, color, national origin, age, disability, sex, sexual orientation, or gender identity.            Thank you!     Thank you for choosing [facility]  for your care. Our goal is always to provide you with excellent care. Hearing back from our patients is one way we can continue to improve our services. Please take a few minutes to complete the written survey that you may receive in the mail after your visit with us. Thank you!             Your Updated Medication List - Protect others around you: Learn how to safely use, store and throw away your medicines at www.disposemymeds.org.          This list is accurate as of: 10/17/17 11:07  AM.  Always use your most recent med list.                   Brand Name Dispense Instructions for use Diagnosis    clobetasol 0.05 % cream    TEMOVATE     Apply topically 2 times daily        lisinopril 20 MG tablet    PRINIVIL/ZESTRIL    90 tablet    TAKE 1 TABLET (20 MG) BY MOUTH DAILY    Essential hypertension with goal blood pressure less than 140/90       pantoprazole 40 MG EC tablet    PROTONIX    90 tablet    TAKE 1 TABLET (40 MG) BY MOUTH DAILY    Gastroesophageal reflux disease with esophagitis       selenium sulfide 2.5 % lotion    SELSUN    120 mL    Apply  topically daily as needed for itching.    Fungal skin infection       triamcinolone 0.1 % cream    KENALOG    15 g    Apply topically 3 times daily    Allergic contact dermatitis, unspecified trigger       VALTREX 1000 mg tablet   Generic drug:  valACYclovir     12 tablet    Take 2 tablets (2,000 mg) by mouth 2 times daily Dispense as written    Recurrent cold sores

## 2017-10-17 NOTE — PROGRESS NOTES
SUBJECTIVE:   Karen Hernandez is a 67 year old female who presents to clinic today for the following health issues:    *HM - Will do flu vaccine today. Honoring choices info given. DEXA and Hep C Screening orders pended, patient will schedule DEXA.     Concern - Mole  Onset: Early this Spring    Description:   Does have a mole near the neckline. Will at times get raised and scaly, will itch at times. Changes constantly. Was a  and has spent a lot of time in the sun.     Intensity: mild    Progression of Symptoms:  changing    Previous history of similar problem:   Yes, has had moles removed in the past and they have come back benign.      Saw the mole in the spring. It will get  Red spot on the upper chest . Last week it was raised and red  Today it is almost resolved.    But does have keratosis on the upper torso.      Problem list and histories reviewed & adjusted, as indicated.  Additional history: as documented    Patient Active Problem List   Diagnosis     FHX COLON CANCER - SCOPE Q 5Y     Hyperlipidemia LDL goal <130     AK (actinic keratosis)     Melanocytic nevus     Recurrent cold sores     Herpes simplex of eye     24 hour contact handout given     Advanced directives,HC info letter sent 2/28/2012     Loss of height     Dermatitis     Gastroesophageal reflux disease with esophagitis     Essential hypertension with goal blood pressure less than 140/90     Osteopenia     Elevated glucose     Keratosis, seborrheic     Past Surgical History:   Procedure Laterality Date     C APPENDECTOMY  1960     COLONOSCOPY  8/10/2012    Procedure: COLONOSCOPY;  Colonoscopy;  Surgeon: Carlos Alatorre MD;  Location: [facility]     COLONOSCOPY N/A 1/4/2017    Procedure: COLONOSCOPY;  Surgeon: Stepan Samayoa MD;  Location: [facility]     EYE SURGERY  2017     HYSTERECTOMY, PAP NO LONGER INDICATED  2000    Ovaries left     TONSILLECTOMY  1958       Social History   Substance Use Topics     Smoking status: Never  Smoker     Smokeless tobacco: Never Used     Alcohol use Yes      Comment: Social-- 6/wk     Family History   Problem Relation Age of Onset     Cancer - colorectal Father      CANCER Daughter      Breast Cancer Daughter      Congenital Anomalies Daughter      trisomy 18     DIABETES No family hx of      Hypertension No family hx of      CEREBROVASCULAR DISEASE No family hx of      Prostate Cancer No family hx of          Current Outpatient Prescriptions   Medication Sig Dispense Refill     pantoprazole (PROTONIX) 40 MG EC tablet TAKE 1 TABLET (40 MG) BY MOUTH DAILY 90 tablet 3     lisinopril (PRINIVIL/ZESTRIL) 20 MG tablet TAKE 1 TABLET (20 MG) BY MOUTH DAILY 90 tablet 3     clobetasol (TEMOVATE) 0.05 % cream Apply topically 2 times daily       VALTREX 1 G tablet Take 2 tablets (2,000 mg) by mouth 2 times daily Dispense as written 12 tablet 1     selenium sulfide (SELSUN) 2.5 % shampoo Apply  topically daily as needed for itching. 120 mL 0     triamcinolone (KENALOG) 0.1 % cream Apply topically 3 times daily (Patient not taking: Reported on 10/17/2017) 15 g 0     Allergies   Allergen Reactions     Nkda [No Known Drug Allergies]      BP Readings from Last 3 Encounters:   10/17/17 128/76   04/03/17 141/87   02/27/17 116/62    Wt Readings from Last 3 Encounters:   10/17/17 146 lb 9.6 oz (66.5 kg)   04/03/17 146 lb (66.2 kg)   01/19/17 143 lb 9.6 oz (65.1 kg)                        Reviewed and updated as needed this visit by clinical staff     Reviewed and updated as needed this visit by Provider         ROS:  C: NEGATIVE for fever, chills, change in weight  INTEGUMENTARY/SKIN: POSITIVE for red area on the upper chest  See note above.   Does have  2 keratosis  Under the bra that will be come irritated   E/M: NEGATIVE for ear, mouth and throat problems  R: NEGATIVE for significant cough or SOB  CV: NEGATIVE for chest pain, palpitations or peripheral edema    OBJECTIVE:     /76 (BP Location: Left arm, Patient  Position: Chair, Cuff Size: Adult Regular)  Pulse 72  Temp 98.2  F (36.8  C) (Tympanic)  Wt 146 lb 9.6 oz (66.5 kg)  BMI 25.36 kg/m2  Body mass index is 25.36 kg/(m^2).   GENERAL: healthy, alert and no distress  SKIN: skin  Tag x 2 along the bra line.  Remove with a straight iris      Diagnostic Test Results:  none     ASSESSMENT/PLAN:     ASSESSMENT/PLAN:      ICD-10-CM    1. Postmenopausal status Z78.0 DX Hip/Pelvis/Spine   2. Need for hepatitis C screening test Z11.59 **Hepatitis C Screen Reflex to RNA FUTURE anytime   3. Recurrent cold sores B00.1 VALTREX 1 G tablet   4. Skin tag L91.8    5. Need for prophylactic vaccination and inoculation against influenza Z23 FLU VACCINE, INCREASED ANTIGEN, PRESV FREE, AGE 65+ [36694]     Vaccine Administration, Initial [80293]     ADMIN INFLUENZA (For MEDICARE Patients ONLY) []   6. Advanced directives, counseling/discussion Z71.89        Patient Instructions   Watch for any signs of infection.      you did get the flu shot today                   See Patient Instructions    AGUS BARR NP, APRN CNP  [facility]

## 2017-10-17 NOTE — NURSING NOTE
"Chief Complaint   Patient presents with     Mole       Initial /76 (BP Location: Left arm, Patient Position: Chair, Cuff Size: Adult Regular)  Pulse 72  Temp 98.2  F (36.8  C) (Tympanic)  Wt 146 lb 9.6 oz (66.5 kg)  BMI 25.36 kg/m2 Estimated body mass index is 25.36 kg/(m^2) as calculated from the following:    Height as of 1/19/17: 5' 3.75\" (1.619 m).    Weight as of this encounter: 146 lb 9.6 oz (66.5 kg).  Medication Reconciliation: complete     Elvira Gaspar CMA (AAMA)      "

## 2017-10-23 ENCOUNTER — TELEPHONE (OUTPATIENT)
Dept: FAMILY MEDICINE | Facility: CLINIC | Age: 67
End: 2017-10-23

## 2017-10-23 NOTE — TELEPHONE ENCOUNTER
Received PA request for Valtrex 1gm from [facility]  Pharmacy Rejection Note: 70 Product/Service Not Covered    Sig: Take 2 tablets (2,000 mg) by mouth 2 times daily Dispense as written  Disp: 12 per 3  CYNTHIA: no    No previous PA on file for this med.    Dx: Recurrent cold sores [B00.1]  Rationale: Tx of Recurrent cold sores [B00.1]    Provided Ins: not provided  Provided Ins ID: [id number]  Provided Ins Phone # [phone]    [employer] Insurance Verification  Member Number [id number]  Carrier Number [id number]  [id number]  Generic Number [id number]    PA submitted to [facility] via [employer], Keycode [id number]        Re-submitted to [employer] via [email or web address], confirmation number [id number]    Christiano KELLER (r)  [facility]

## 2017-10-24 NOTE — TELEPHONE ENCOUNTER
Received response from [employer]    Response faxed to the pharmacy.        Christiano Kern RT (r)  [facility]

## 2017-10-31 ENCOUNTER — TELEPHONE (OUTPATIENT)
Dept: FAMILY MEDICINE | Facility: CLINIC | Age: 67
End: 2017-10-31

## 2017-10-31 NOTE — TELEPHONE ENCOUNTER
Patient called and says her face is red mostly on her cheeks would like to talk to the nurse.    Mony Goodman [facility]

## 2017-11-01 NOTE — TELEPHONE ENCOUNTER
Spoke with patient  She has noted a dry red scaly patches on her cheeks, this is new, discussed face products she uses an astringent on her face and soap, discussed at this time in life need to think about a good hydration and moisture for face and body, advised to stop soap and astringent , start new product line good hydration   If sx don;t improve to call back   NOVA Gomez  RN/[facility]

## 2017-11-14 DIAGNOSIS — L23.9 ALLERGIC CONTACT DERMATITIS, UNSPECIFIED TRIGGER: ICD-10-CM

## 2017-11-15 RX ORDER — TRIAMCINOLONE ACETONIDE 1 MG/G
CREAM TOPICAL
Qty: 15 G | Refills: 0 | Status: SHIPPED | OUTPATIENT
Start: 2017-11-15 | End: 2017-12-12

## 2017-12-12 ENCOUNTER — OFFICE VISIT (OUTPATIENT)
Dept: FAMILY MEDICINE | Facility: CLINIC | Age: 67
End: 2017-12-12
Payer: COMMERCIAL

## 2017-12-12 VITALS
SYSTOLIC BLOOD PRESSURE: 139 MMHG | TEMPERATURE: 98 F | DIASTOLIC BLOOD PRESSURE: 89 MMHG | BODY MASS INDEX: 24.87 KG/M2 | HEART RATE: 85 BPM | WEIGHT: 145.7 LBS | OXYGEN SATURATION: 97 % | HEIGHT: 64 IN

## 2017-12-12 DIAGNOSIS — L30.9 DERMATITIS: Primary | ICD-10-CM

## 2017-12-12 DIAGNOSIS — J06.9 VIRAL URI: ICD-10-CM

## 2017-12-12 PROCEDURE — 99214 OFFICE O/P EST MOD 30 MIN: CPT | Performed by: FAMILY MEDICINE

## 2017-12-12 RX ORDER — TRIAMCINOLONE ACETONIDE 1 MG/G
CREAM TOPICAL
Qty: 30 G | Refills: 1 | Status: SHIPPED | OUTPATIENT
Start: 2017-12-12 | End: 2019-07-15 | Stop reason: DRUGHIGH

## 2017-12-12 NOTE — MR AVS SNAPSHOT
"              After Visit Summary   12/12/2017    Karen Hernandez    MRN: [id number]           Patient Information     Date Of Birth          1950        Visit Information        Provider Department      12/12/2017 10:00 AM Bettina Jiménez MD [facility]        Today's Diagnoses     Dermatitis    -  1      Care Instructions          Emollients - thick lotions    cetaphil - tub - approximately $11  vanicream  eucerin  lubriderm  vaseline            Follow-ups after your visit        Who to contact     Normal or non-critical lab and imaging results will be communicated to you by Ophtalmopharmahart, letter or phone within 4 business days after the clinic has received the results. If you do not hear from us within 7 days, please contact the clinic through Ophtalmopharmahart or phone. If you have a critical or abnormal lab result, we will notify you by phone as soon as possible.  Submit refill requests through Setred or call your pharmacy and they will forward the refill request to us. Please allow 3 business days for your refill to be completed.          If you need to speak with a  for additional information , please call: [phone]             Additional Information About Your Visit        MyChart Information     Setred gives you secure access to your electronic health record. If you see a primary care provider, you can also send messages to your care team and make appointments. If you have questions, please call your primary care clinic.  If you do not have a primary care provider, please call [phone] and they will assist you.        Care EveryWhere ID     This is your Care EveryWhere ID. This could be used by other organizations to access your [place] medical records  [id number]        Your Vitals Were     Pulse Temperature Height Pulse Oximetry BMI (Body Mass Index)       85 98  F (36.7  C) (Tympanic) 5' 3.75\" (1.619 m) 97% 25.21 kg/m2        Blood Pressure from Last 3 Encounters: "   12/12/17 139/89   10/17/17 128/76   04/03/17 141/87    Weight from Last 3 Encounters:   12/12/17 145 lb 11.2 oz (66.1 kg)   10/17/17 146 lb 9.6 oz (66.5 kg)   04/03/17 146 lb (66.2 kg)              Today, you had the following     No orders found for display         Today's Medication Changes          These changes are accurate as of: 12/12/17 10:31 AM.  If you have any questions, ask your nurse or doctor.               These medicines have changed or have updated prescriptions.        Dose/Directions    triamcinolone 0.1 % cream   Commonly known as:  KENALOG   This may have changed:  See the new instructions.   Used for:  Dermatitis   Changed by:  Bettina Jiménez MD        APPLY TOPICALLY TO AFFECTED AREA(S) 2 TIMES DAILY for 7-10 days   Quantity:  30 g   Refills:  1            Where to get your medicines      These medications were sent to [place] [address] [place] [address]  [address] [place] [address]     Phone:  [phone]     triamcinolone 0.1 % cream                Primary Care Provider Office Phone # Fax #    Saniaguanaco Sr, APRN [facility] [phone] [address] [facility]   [facility] [address]        Equal Access to Services     MILLY RAMOS AH: Hadii rufino ku hadasho Soomaali, waaxda luqadaha, qaybta kaalmada adeegyada, waxjessica jacob haynabil payton . So [facility] [phone].    ATENCIÓN: Si habla español, tiene a gallagher disposición servicios gratuitos de asistencia lingüística. Llame al [phone].    We comply with applicable federal civil rights laws and [place] laws. We do not discriminate on the basis of race, color, national origin, age, disability, sex, sexual orientation, or gender identity.            Thank you!     Thank you for choosing [facility]  for your care. Our goal is always to provide you with excellent care. Hearing back from our patients is one way we can continue to improve our services. Please take a few minutes to  complete the written survey that you may receive in the mail after your visit with us. Thank you!             Your Updated Medication List - Protect others around you: Learn how to safely use, store and throw away your medicines at www.disposemymeds.org.          This list is accurate as of: 12/12/17 10:31 AM.  Always use your most recent med list.                   Brand Name Dispense Instructions for use Diagnosis    clobetasol 0.05 % cream    TEMOVATE     Apply topically 2 times daily        lisinopril 20 MG tablet    PRINIVIL/ZESTRIL    90 tablet    TAKE 1 TABLET (20 MG) BY MOUTH DAILY    Essential hypertension with goal blood pressure less than 140/90       pantoprazole 40 MG EC tablet    PROTONIX    90 tablet    TAKE 1 TABLET (40 MG) BY MOUTH DAILY    Gastroesophageal reflux disease with esophagitis       selenium sulfide 2.5 % lotion    SELSUN    120 mL    Apply  topically daily as needed for itching.    Fungal skin infection       triamcinolone 0.1 % cream    KENALOG    30 g    APPLY TOPICALLY TO AFFECTED AREA(S) 2 TIMES DAILY for 7-10 days    Dermatitis       VALTREX 1000 mg tablet   Generic drug:  valACYclovir     12 tablet    Take 2 tablets (2,000 mg) by mouth 2 times daily Dispense as written    Recurrent cold sores

## 2017-12-12 NOTE — NURSING NOTE
"Chief Complaint   Patient presents with     Cough       Initial /89 (BP Location: Right arm, Patient Position: Sitting, Cuff Size: Adult Regular)  Pulse 85  Temp 98  F (36.7  C) (Tympanic)  Ht 5' 3.75\" (1.619 m)  Wt 145 lb 11.2 oz (66.1 kg)  SpO2 97%  BMI 25.21 kg/m2 Estimated body mass index is 25.21 kg/(m^2) as calculated from the following:    Height as of this encounter: 5' 3.75\" (1.619 m).    Weight as of this encounter: 145 lb 11.2 oz (66.1 kg).  Medication Reconciliation: complete   Nory Ayoub LPN    "

## 2017-12-12 NOTE — PATIENT INSTRUCTIONS
Emollients - thick lotions    cetaphil - tub - approximately $11  vanicream  eucerin  lubriderm  vaseline

## 2018-01-25 ENCOUNTER — OFFICE VISIT (OUTPATIENT)
Dept: FAMILY MEDICINE | Facility: CLINIC | Age: 68
End: 2018-01-25
Payer: COMMERCIAL

## 2018-01-25 VITALS
RESPIRATION RATE: 16 BRPM | OXYGEN SATURATION: 98 % | TEMPERATURE: 98 F | HEART RATE: 80 BPM | SYSTOLIC BLOOD PRESSURE: 132 MMHG | DIASTOLIC BLOOD PRESSURE: 82 MMHG

## 2018-01-25 DIAGNOSIS — Z23 NEED FOR VACCINATION: ICD-10-CM

## 2018-01-25 DIAGNOSIS — H10.9 BACTERIAL CONJUNCTIVITIS OF LEFT EYE: Primary | ICD-10-CM

## 2018-01-25 PROCEDURE — 90732 PPSV23 VACC 2 YRS+ SUBQ/IM: CPT | Performed by: NURSE PRACTITIONER

## 2018-01-25 PROCEDURE — 99213 OFFICE O/P EST LOW 20 MIN: CPT | Mod: 25 | Performed by: NURSE PRACTITIONER

## 2018-01-25 PROCEDURE — G0009 ADMIN PNEUMOCOCCAL VACCINE: HCPCS | Performed by: NURSE PRACTITIONER

## 2018-01-25 RX ORDER — POLYMYXIN B SULFATE AND TRIMETHOPRIM 1; 10000 MG/ML; [USP'U]/ML
1 SOLUTION OPHTHALMIC 4 TIMES DAILY
Qty: 2 ML | Refills: 0 | Status: SHIPPED | OUTPATIENT
Start: 2018-01-25 | End: 2018-01-30

## 2018-01-25 NOTE — NURSING NOTE
"Chief Complaint   Patient presents with     Eye Problem     Imm/Inj     pneumovax 23       Initial /82 (BP Location: Right arm, Patient Position: Sitting, Cuff Size: Adult Regular)  Pulse 80  Temp 98  F (36.7  C) (Tympanic)  Resp 16  SpO2 98% Estimated body mass index is 25.21 kg/(m^2) as calculated from the following:    Height as of 12/12/17: 5' 3.75\" (1.619 m).    Weight as of 12/12/17: 145 lb 11.2 oz (66.1 kg).  Medication Reconciliation: complete     Screening Questionnaire for Adult Immunization    Are you sick today?   No   Do you have allergies to medications, food, a vaccine component or latex?   No   Have you ever had a serious reaction after receiving a vaccination?   No   Do you have a long-term health problem with heart disease, lung disease, asthma, kidney disease, metabolic disease (e.g. diabetes), anemia, or other blood disorder?   No   Do you have cancer, leukemia, HIV/AIDS, or any other immune system problem?   No   In the past 3 months, have you taken medications that affect  your immune system, such as prednisone, other steroids, or anticancer drugs; drugs for the treatment of rheumatoid arthritis, Crohn s disease, or psoriasis; or have you had radiation treatments?   No   Have you had a seizure, or a brain or other nervous system problem?   No   During the past year, have you received a transfusion of blood or blood     products, or been given immune (gamma) globulin or antiviral drug?   No   For women: Are you pregnant or is there a chance you could become        pregnant during the next month?   No   Have you received any vaccinations in the past 4 weeks?   No     Immunization questionnaire answers were all negative.        Per orders of ABA Yoon CNP, injection of Pneumovax 23 given by Fidelina Schilling. Patient instructed to remain in clinic for 15 minutes afterwards, and to report any adverse reaction to me immediately.       Screening performed by Fidelina" Shakir on 1/25/2018 at 9:54 AM.

## 2018-01-25 NOTE — MR AVS SNAPSHOT
After Visit Summary   1/25/2018    Karen Hernandez    MRN: [id number]           Patient Information     Date Of Birth          1950        Visit Information        Provider Department      1/25/2018 9:20 AM Elvira Cee APRN [facility]        Today's Diagnoses     Bacterial conjunctivitis of left eye    -  1      Care Instructions      Conjunctivitis, Bacterial    You have an infection in the membranes covering the white part of the eye. This part of the eye is called the conjunctiva. The infection is called conjunctivitis. The most common symptoms of conjunctivitis include a thick, pus-like discharge from the eye, swollen eyelids, redness, eyelids sticking together upon awakening, and a gritty or scratchy feeling in the eye. Your infection was caused by bacteria. It may be treated with medicine. With treatment, the infection takes about 7 to 10 days to resolve.  Home care    Use prescribed antibiotic eye drops or ointment as directed to treat the infection.    Apply a warm compress (towel soaked in warm water) to the affected eye 3 to 4 times a day. Do this just before applying medicine to the eye.    Use a warm, wet cloth to wipe away crusting of the eyelids in the morning. This is caused by mucus drainage during the night. You may also use saline irrigating solution or artificial tears to rinse away mucus in the eye. Do not put a patch over the eye.    Wash your hands before and after touching the infected eye. This is to prevent spreading the infection to the other eye, and to other people. Don't share your towels or washcloths with others.    You may use acetaminophen or ibuprofen to control pain, unless another medicine was prescribed. (Note: If you have chronic liver or kidney disease or have ever had a stomach ulcer or gastrointestinal bleeding, talk with your doctor before using these medicines.)    Don't wear contact lenses until your eyes have healed and all  symptoms are gone.  Follow-up care  Follow up with your healthcare provider, or as advised.  When to seek medical advice  Call your healthcare provider right away if any of these occur:    Worsening vision    Increasing pain in the eye    Increasing swelling or redness of the eyelid    Redness spreading around the eye  Date Last Reviewed: 6/14/2015 2000-2017 [employer]. [address] [place] [address]. All rights reserved. This information is not intended as a substitute for professional medical care. Always follow your healthcare professional's instructions.                Follow-ups after your visit        Who to contact     If you have questions or need follow up information about today's clinic visit or your schedule please contact [facility] directly at [phone].  Normal or non-critical lab and imaging results will be communicated to you by MyChart, letter or phone within 4 business days after the clinic has received the results. If you do not hear from us within 7 days, please contact the clinic through MyChart or phone. If you have a critical or abnormal lab result, we will notify you by phone as soon as possible.  Submit refill requests through Stalkthis or call your pharmacy and they will forward the refill request to us. Please allow 3 business days for your refill to be completed.          Additional Information About Your Visit        [employer]Sentinel[employer] Information     Stalkthis gives you secure access to your electronic health record. If you see a primary care provider, you can also send messages to your care team and make appointments. If you have questions, please call your primary care clinic.  If you do not have a primary care provider, please call [phone] and they will assist you.        Care EveryWhere ID     This is your Care EveryWhere ID. This could be used by other organizations to access your [place] medical records  [id number]        Your Vitals Were      Pulse Temperature Respirations Pulse Oximetry          80 98  F (36.7  C) (Tympanic) 16 98%         Blood Pressure from Last 3 Encounters:   01/25/18 132/82   12/12/17 139/89   10/17/17 128/76    Weight from Last 3 Encounters:   12/12/17 145 lb 11.2 oz (66.1 kg)   10/17/17 146 lb 9.6 oz (66.5 kg)   04/03/17 146 lb (66.2 kg)              Today, you had the following     No orders found for display         Today's Medication Changes          These changes are accurate as of 1/25/18  9:46 AM.  If you have any questions, ask your nurse or doctor.               Start taking these medicines.        Dose/Directions    trimethoprim-polymyxin b ophthalmic solution   Commonly known as:  POLYTRIM   Used for:  Bacterial conjunctivitis of left eye   Started by:  Elvira Cee APRN CNP        Dose:  1 drop   Place 1 drop into both eyes 4 times daily for 7 days   Quantity:  2 mL   Refills:  0            Where to get your medicines      These medications were sent to [facility] [address] [place] [address] [address]     Phone:  [phone]     trimethoprim-polymyxin b ophthalmic solution                Primary Care Provider Office Phone # Fax #    SaniaABA Dawn [phone]       [address] [facility]   [facility] [address]        Equal Access to Services     MILLY RAMOS AH: Hadii aad cathie hadasho Somykeali, waaxda luqadaha, qaybta kaalmada adeegyada, waxay nidia payton . So [facility] [phone].    ATENCIÓN: Si habla español, tiene a gallagher disposición servicios gratuitos de asistencia lingüística. Llame al [phone].    We comply with applicable federal civil rights laws and [place] laws. We do not discriminate on the basis of race, color, national origin, age, disability, sex, sexual orientation, or gender identity.            Thank you!     Thank you for choosing [facility]  for your care. Our goal is always to provide  you with excellent care. Hearing back from our patients is one way we can continue to improve our services. Please take a few minutes to complete the written survey that you may receive in the mail after your visit with us. Thank you!             Your Updated Medication List - Protect others around you: Learn how to safely use, store and throw away your medicines at www.disposemymeds.org.          This list is accurate as of 1/25/18  9:46 AM.  Always use your most recent med list.                   Brand Name Dispense Instructions for use Diagnosis    clobetasol 0.05 % cream    TEMOVATE     Apply topically 2 times daily        lisinopril 20 MG tablet    PRINIVIL/ZESTRIL    90 tablet    TAKE 1 TABLET (20 MG) BY MOUTH DAILY    Essential hypertension with goal blood pressure less than 140/90       pantoprazole 40 MG EC tablet    PROTONIX    90 tablet    TAKE 1 TABLET (40 MG) BY MOUTH DAILY    Gastroesophageal reflux disease with esophagitis       selenium sulfide 2.5 % lotion    SELSUN    120 mL    Apply  topically daily as needed for itching.    Fungal skin infection       triamcinolone 0.1 % cream    KENALOG    30 g    APPLY TOPICALLY TO AFFECTED AREA(S) 2 TIMES DAILY for 7-10 days    Dermatitis       trimethoprim-polymyxin b ophthalmic solution    POLYTRIM    2 mL    Place 1 drop into both eyes 4 times daily for 7 days    Bacterial conjunctivitis of left eye       VALTREX 1000 mg tablet   Generic drug:  valACYclovir     12 tablet    Take 2 tablets (2,000 mg) by mouth 2 times daily Dispense as written    Recurrent cold sores

## 2018-01-25 NOTE — PATIENT INSTRUCTIONS
Conjunctivitis, Bacterial    You have an infection in the membranes covering the white part of the eye. This part of the eye is called the conjunctiva. The infection is called conjunctivitis. The most common symptoms of conjunctivitis include a thick, pus-like discharge from the eye, swollen eyelids, redness, eyelids sticking together upon awakening, and a gritty or scratchy feeling in the eye. Your infection was caused by bacteria. It may be treated with medicine. With treatment, the infection takes about 7 to 10 days to resolve.  Home care    Use prescribed antibiotic eye drops or ointment as directed to treat the infection.    Apply a warm compress (towel soaked in warm water) to the affected eye 3 to 4 times a day. Do this just before applying medicine to the eye.    Use a warm, wet cloth to wipe away crusting of the eyelids in the morning. This is caused by mucus drainage during the night. You may also use saline irrigating solution or artificial tears to rinse away mucus in the eye. Do not put a patch over the eye.    Wash your hands before and after touching the infected eye. This is to prevent spreading the infection to the other eye, and to other people. Don't share your towels or washcloths with others.    You may use acetaminophen or ibuprofen to control pain, unless another medicine was prescribed. (Note: If you have chronic liver or kidney disease or have ever had a stomach ulcer or gastrointestinal bleeding, talk with your doctor before using these medicines.)    Don't wear contact lenses until your eyes have healed and all symptoms are gone.  Follow-up care  Follow up with your healthcare provider, or as advised.  When to seek medical advice  Call your healthcare provider right away if any of these occur:    Worsening vision    Increasing pain in the eye    Increasing swelling or redness of the eyelid    Redness spreading around the eye  Date Last Reviewed: 6/14/2015 2000-2017 The [employer]. [address] [place] [address], [place] [address]. All rights reserved. This information is not intended as a substitute for professional medical care. Always follow your healthcare professional's instructions.

## 2018-01-25 NOTE — PROGRESS NOTES
SUBJECTIVE:   Karen Hernandez is a 67 year old female who presents to clinic today for the following health issues:      Eye(s) Problem  Onset: yesterday    Description:   Location: bilateral, right starting to itch, left with redness and matter  Pain: YES-eyeball feels tender on left  Redness: YES    Accompanying Signs & Symptoms:  Discharge/mattering: YES- left  Swelling: no   Visual changes: YES- floating matter, wipes away, Denies pain with eyeball movement or vision changes.   Fever: no   Nasal Congestion: no   Bothered by bright lights: no     History:   Trauma: no   Foreign body exposure: no     Precipitating factors:   Wearing contacts: no     Alleviating factors:  Improved by: none    Therapies Tried and outcome: warm wash cloth-removes discharge    Problem list and histories reviewed & adjusted, as indicated.  Additional history: as documented    Patient Active Problem List   Diagnosis     FHX COLON CANCER - SCOPE Q 5Y     Hyperlipidemia LDL goal <130     AK (actinic keratosis)     Melanocytic nevus     Recurrent cold sores     Herpes simplex of eye     24 hour contact handout given     Loss of height     Dermatitis     Gastroesophageal reflux disease with esophagitis     Essential hypertension with goal blood pressure less than 140/90     Osteopenia     Elevated glucose     Keratosis, seborrheic     Advanced directives, counseling/discussion     Past Surgical History:   Procedure Laterality Date     C APPENDECTOMY  1960     COLONOSCOPY  8/10/2012    Procedure: COLONOSCOPY;  Colonoscopy;  Surgeon: Carlos Alatorre MD;  Location: [facility]     COLONOSCOPY N/A 1/4/2017    Procedure: COLONOSCOPY;  Surgeon: Stepan Samayoa MD;  Location: [facility]     EYE SURGERY  2017     HYSTERECTOMY, PAP NO LONGER INDICATED  2000    Ovaries left     TONSILLECTOMY  1958       Social History   Substance Use Topics     Smoking status: Never Smoker     Smokeless tobacco: Never Used     Alcohol use Yes      Comment:  Social-- 6/wk     Family History   Problem Relation Age of Onset     Cancer - colorectal Father      CANCER Daughter      Breast Cancer Daughter      Congenital Anomalies Daughter      trisomy 18     DIABETES No family hx of      Hypertension No family hx of      CEREBROVASCULAR DISEASE No family hx of      Prostate Cancer No family hx of            Reviewed and updated as needed this visit by clinical staff       Reviewed and updated as needed this visit by Provider         ROS:  Constitutional, HEENT, cardiovascular, pulmonary, gi and gu systems are negative, except as otherwise noted.    OBJECTIVE:     /82 (BP Location: Right arm, Patient Position: Sitting, Cuff Size: Adult Regular)  Pulse 80  Temp 98  F (36.7  C) (Tympanic)  Resp 16  SpO2 98%  There is no height or weight on file to calculate BMI.  GENERAL: healthy, alert and no distress  EYES: PERRL, EOMI, eyelids- WNL and conjunctiva/corneas- conjunctival injection OS and yellow colored discharge present left  HENT: ear canals and TM's normal, nose and mouth without ulcers or lesions  NECK: no adenopathy, no asymmetry, masses, or scars and thyroid normal to palpation  RESP: lungs clear to auscultation - no rales, rhonchi or wheezes  CV: regular rates and rhythm, normal S1 S2, no S3 or S4 and no murmur, click or rub  SKIN: no suspicious lesions or rashes  NEURO: Normal strength and tone, mentation intact and speech normal    Diagnostic Test Results:  none     ASSESSMENT/PLAN:       ICD-10-CM    1. Bacterial conjunctivitis of left eye H10.9 trimethoprim-polymyxin b (POLYTRIM) ophthalmic solution   2. Need for vaccination Z23 VACCINE ADMINISTRATION, INITIAL     Pneumococcal vaccine 23 valent PPSV23  (Pneumovax) [09598]       CONSULTATION/REFERRAL to OPHTHALMOLOGY for vision changes, worsening pain in eye or persistent symptoms in 2 days.    FUTURE APPOINTMENTS:       - Follow-up visit PRN new or worsening symptoms.     Patient Instructions      Conjunctivitis, Bacterial    You have an infection in the membranes covering the white part of the eye. This part of the eye is called the conjunctiva. The infection is called conjunctivitis. The most common symptoms of conjunctivitis include a thick, pus-like discharge from the eye, swollen eyelids, redness, eyelids sticking together upon awakening, and a gritty or scratchy feeling in the eye. Your infection was caused by bacteria. It may be treated with medicine. With treatment, the infection takes about 7 to 10 days to resolve.  Home care    Use prescribed antibiotic eye drops or ointment as directed to treat the infection.    Apply a warm compress (towel soaked in warm water) to the affected eye 3 to 4 times a day. Do this just before applying medicine to the eye.    Use a warm, wet cloth to wipe away crusting of the eyelids in the morning. This is caused by mucus drainage during the night. You may also use saline irrigating solution or artificial tears to rinse away mucus in the eye. Do not put a patch over the eye.    Wash your hands before and after touching the infected eye. This is to prevent spreading the infection to the other eye, and to other people. Don't share your towels or washcloths with others.    You may use acetaminophen or ibuprofen to control pain, unless another medicine was prescribed. (Note: If you have chronic liver or kidney disease or have ever had a stomach ulcer or gastrointestinal bleeding, talk with your doctor before using these medicines.)    Don't wear contact lenses until your eyes have healed and all symptoms are gone.  Follow-up care  Follow up with your healthcare provider, or as advised.  When to seek medical advice  Call your healthcare provider right away if any of these occur:    Worsening vision    Increasing pain in the eye    Increasing swelling or redness of the eyelid    Redness spreading around the eye  Date Last Reviewed: 6/14/2015 2000-2017 [employer]  [employer]. [address] [place] [address], [place] [address]. All rights reserved. This information is not intended as a substitute for professional medical care. Always follow your healthcare professional's instructions.            ABA Yoon [facility]

## 2018-01-30 ENCOUNTER — OFFICE VISIT (OUTPATIENT)
Dept: FAMILY MEDICINE | Facility: CLINIC | Age: 68
End: 2018-01-30
Payer: COMMERCIAL

## 2018-01-30 VITALS
OXYGEN SATURATION: 98 % | TEMPERATURE: 97.9 F | SYSTOLIC BLOOD PRESSURE: 138 MMHG | DIASTOLIC BLOOD PRESSURE: 66 MMHG | RESPIRATION RATE: 16 BRPM | HEART RATE: 89 BPM

## 2018-01-30 DIAGNOSIS — L30.9 DERMATITIS: Primary | ICD-10-CM

## 2018-01-30 DIAGNOSIS — H10.32 ACUTE CONJUNCTIVITIS OF LEFT EYE, UNSPECIFIED ACUTE CONJUNCTIVITIS TYPE: ICD-10-CM

## 2018-01-30 PROCEDURE — 99214 OFFICE O/P EST MOD 30 MIN: CPT | Performed by: NURSE PRACTITIONER

## 2018-01-30 RX ORDER — GENTAMICIN SULFATE 3 MG/ML
1 SOLUTION/ DROPS OPHTHALMIC 4 TIMES DAILY
Qty: 2 ML | Refills: 0 | Status: SHIPPED | OUTPATIENT
Start: 2018-01-30 | End: 2018-02-05

## 2018-01-30 NOTE — MR AVS SNAPSHOT
After Visit Summary   1/30/2018    Karen Hernandez    MRN: [id number]           Patient Information     Date Of Birth          1950        Visit Information        Provider Department      1/30/2018 1:20 PM Elvira Cee APRN [facility]        Today's Diagnoses     Dermatitis    -  1    Acute conjunctivitis of left eye, unspecified acute conjunctivitis type          Care Instructions      Atopic Dermatitis (Adult)  Atopic dermatitis is a dry, itchy, red rash. It s also called eczema. The rash is chronic, or ongoing. It can come and go over time. The disease is often passed down in families. It causes a problem with the skin barrier that makes the skin more sensitive to the environment and other factors. The increased skin sensitivity causes an itch, which causes scratching. Scratching can worsen the itching or also break the skin. This can put the skin at risk of infection.  The condition is most common in people with asthma, hay fever, hives, or dry or sensitive skin. The rash may be caused by extreme heat or heavy sweating. Skin irritants can cause the rash to flare up. These can include wool or silk clothing, grease, oils, some medicines, and harsh soaps and detergents. Emotional stress can also be a trigger.  Treatment is done to relieve the itching and inflammation of the skin.  Home care  Follow these tips to care for your condition:    Keep the areas of rash clean by bathing at least every other day. Use lukewarm water to bathe. Don t use hot water, which can dry out the skin.    Don t use soaps with strong detergents. Use mild soaps made for sensitive skin.    Apply a cream or ointment to damp skin right after bathing.    Avoid things that irritate your skin. Wear absorbent, soft fabrics next to the skin rather than rough or scratchy materials.    Use mild laundry soap free of scents and perfumes. Make sure to rinse all the soap out of your clothes.    Treat any skin  infection as directed.    Use oral diphenhydramine to help reduce itching. This is an antihistamine you can buy at drug and grocery stores. It can make you sleepy, so use lower doses during the daytime. Or you can use loratadine. This is an antihistamine that will not make you sleepy. Do not use diphenhydramine if you have glaucoma or have trouble urinating due to an enlarged prostate.  Follow-up care  See your healthcare provider, or as advised. If your symptoms don t get better or if they get worse in the next 7 days, make an appointment with your healthcare provider.  When to seek medical advice  Call your healthcare provider right away  if any of these occur:    Increasing area of redness or pain in the skin    Yellow crusts or wet drainage from the rash    Fever of 100.4 F (38 C) or higher, or as directed by your healthcare provider  Date Last Reviewed: 9/1/2016 2000-2017 The [employer]. [address] [place] [address]. All rights reserved. This information is not intended as a substitute for professional medical care. Always follow your healthcare professional's instructions.        Managing Atopic Dermatitis (Eczema)     After bathing, gently pat your skin dry (don t rub). Apply moisturizer while your skin is still damp.   To manage your symptoms and help reduce the severity and frequency, try these self-care tips:  Caring for your skin    Use a gentle, fragrance-free cleanser (or nonsoap cleanser) for bathing. Rinse well. Pat skin dry.    Take warm, not hot, baths or showers. Try to limit them to no more that 10 to 15 minutes.     Use moisturizer liberally right after you bathe, while your skin is still damp.    Avoid scratching because it will cause more damage to your skin.     Topical, over-the-counter hydrocortisone cream may help control mild symptoms.   Controlling your environment    Avoid extreme heat or cold.    Avoid very humid or very dry air.    If your home or office air  is very dry, use a humidifier.    Avoid allergens, such as dust, that may be present in bedding, carpets, plush toys, or rugs.    Know that pet hair and dander can cause flare-ups.  Seeking medical treatment  Another way to keep symptoms under control is to seek medical treatment. Talk with your healthcare provider about the type of treatment that may work best for you. Your provider may prescribe treatments such as the following:    Topical treatments to put on the skin daily    Medicines taken by mouth (oral medicines), such as antihistamines, antibiotics, or corticosteroids    In severe cases shots (injections) may be needed to control the symptoms. You may even need antibiotics if skin infections occur.  Treatments don t work the same way for every person. So if your symptoms continue or get worse, ask your healthcare provider about other treatments.  Making lifestyle choices    Manage the stress in your life.    Wear loose-fitting cotton clothing that does not bind or rub your skin.    Avoid contact with wool or other scratchy fabrics.    Use fragrance-free products.  Getting good results  Now that you know more about atopic dermatitis, the next step is up to you. Follow your healthcare provider s treatment plan and your self-care routine. This will help bring atopic dermatitis under control. If your symptoms persist, be sure to let your health care provider know.   Date Last Reviewed: 2/1/2017 2000-2017 The [employer]. [address] [place] [address], [place] [address]. All rights reserved. This information is not intended as a substitute for professional medical care. Always follow your healthcare professional's instructions.        Conjunctivitis, Nonspecific    The membrane that covers the white part of your eye (the conjunctiva) is inflamed. Inflammation happens when your body responds to an injury, allergic reaction, infection, or illness. Symptoms of inflammation in the eye may include redness,  irritation, itching, swelling, or burning. These symptoms should go away within the next 24 hours. Conjunctivitis may be related to a particle that was in your eye. If so, it may wash out with your tears or irrigation treatment. Being exposed to liquid chemicals or fumes may also cause this reaction.   Home care    Apply a cold pack over the eye for 20 minutes at a time. This will reduce pain. To make a cold pack, put ice cubes in a plastic bag that seals at the top. Wrap the bag in a clean, thin towel or cloth.    Artificial tears may be prescribed to reduce irritation or redness.  These should be used 3 to 4 times a day.    You may use acetaminophen or ibuprofen to control pain, unless another medicine was prescribed. (Note: If you have chronic liver or kidney disease, or if you have ever had a stomach ulcer or gastrointestinal bleeding, talk with your healthcare provider before using these medicines.)  Follow-up care  Follow up with your healthcare provider, or as advised.  When to seek medical advice  Call your healthcare provider right away if any of these occur:    Increased eyelid swelling    Increased eye pain    Increased redness or drainage from the eye    Increased blurry vision or increased sensitivity to light    Failure of normal vision to return within 24 to 48 hours  Date Last Reviewed: 7/1/2017 2000-2017 [employer]. [address] [place] [address], [place] [address]. All rights reserved. This information is not intended as a substitute for professional medical care. Always follow your healthcare professional's instructions.                Follow-ups after your visit        Additional Services     DERMATOLOGY REFERRAL       Your provider has referred you to: [facility]: [facility] [phone]   [email or web address]    Please be aware that coverage of these services is subject to the terms and limitations of your health insurance plan.  Call member  services at your health plan with any benefit or coverage questions.      Please bring the following with you to your appointment:    (1) Any X-Rays, CTs or MRIs which have been performed.  Contact the facility where they were done to arrange for  prior to your scheduled appointment.    (2) List of current medications  (3) This referral request   (4) Any documents/labs given to you for this referral                  Who to contact     Normal or non-critical lab and imaging results will be communicated to you by [employer], letter or phone within 4 business days after the clinic has received the results. If you do not hear from us within 7 days, please contact the clinic through [employer] or phone. If you have a critical or abnormal lab result, we will notify you by phone as soon as possible.  Submit refill requests through [employer] or call your pharmacy and they will forward the refill request to us. Please allow 3 business days for your refill to be completed.          If you need to speak with a  for additional information , please call: [phone]             Additional Information About Your Visit        [employer] Information     [employer] gives you secure access to your electronic health record. If you see a primary care provider, you can also send messages to your care team and make appointments. If you have questions, please call your primary care clinic.  If you do not have a primary care provider, please call [phone] and they will assist you.        Care EveryWhere ID     This is your Care EveryWhere ID. This could be used by other organizations to access your [facility] medical records  [id number]        Your Vitals Were     Pulse Temperature Respirations Pulse Oximetry          89 97.9  F (36.6  C) (Tympanic) 16 98%         Blood Pressure from Last 3 Encounters:   01/30/18 138/66   01/25/18 132/82   12/12/17 139/89    Weight from Last 3 Encounters:   12/12/17 145 lb 11.2 oz (66.1 kg)    10/17/17 146 lb 9.6 oz (66.5 kg)   04/03/17 146 lb (66.2 kg)              We Performed the Following     DERMATOLOGY REFERRAL          Today's Medication Changes          These changes are accurate as of 1/30/18  1:51 PM.  If you have any questions, ask your nurse or doctor.               Start taking these medicines.        Dose/Directions    gentamicin 0.3 % ophthalmic solution   Commonly known as:  GARAMYCIN   Used for:  Acute conjunctivitis of left eye, unspecified acute conjunctivitis type   Started by:  Elvira Cee, ABA CNP        Dose:  1 drop   Place 1 drop Into the left eye 4 times daily for 7 days   Quantity:  2 mL   Refills:  0            Where to get your medicines      These [facility] [address] [facility]  [facility] [address]     Phone:  [phone]     gentamicin 0.3 % ophthalmic solution                Primary Care Provider Office Phone # Fax #    ABA Andre [facility] [phone] [address] [facility]   [facility] [address]        Equal Access to Services     [facility] AH: Hadii aad ku hadasho Soomaali, waaxda luqadaha, qaybta kaalmada adeegyada, carlos alberto morales. So [facility] [phone].    ATENCIÓN: Si habla español, tiene a gallagher disposición servicios gratuitos de asistencia lingüística. [facility] [phone].    We comply with applicable federal civil rights laws and [place] laws. We do not discriminate on the basis of race, color, national origin, age, disability, sex, sexual orientation, or gender identity.            Thank you!     Thank you for choosing [facility]  for your care. Our goal is always to provide you with excellent care. Hearing back from our patients is one way we can continue to improve our services. Please take a few minutes to complete the written survey that you may receive in the mail after your visit with us. Thank you!             Your  Updated Medication List - Protect others around you: Learn how to safely use, store and throw away your medicines at www.disposemymeds.org.          This list is accurate as of 1/30/18  1:51 PM.  Always use your most recent med list.                   Brand Name Dispense Instructions for use Diagnosis    clobetasol 0.05 % cream    TEMOVATE     Apply topically 2 times daily        gentamicin 0.3 % ophthalmic solution    GARAMYCIN    2 mL    Place 1 drop Into the left eye 4 times daily for 7 days    Acute conjunctivitis of left eye, unspecified acute conjunctivitis type       lisinopril 20 MG tablet    PRINIVIL/ZESTRIL    90 tablet    TAKE 1 TABLET (20 MG) BY MOUTH DAILY    Essential hypertension with goal blood pressure less than 140/90       pantoprazole 40 MG EC tablet    PROTONIX    90 tablet    TAKE 1 TABLET (40 MG) BY MOUTH DAILY    Gastroesophageal reflux disease with esophagitis       selenium sulfide 2.5 % lotion    SELSUN    120 mL    Apply  topically daily as needed for itching.    Fungal skin infection       triamcinolone 0.1 % cream    KENALOG    30 g    APPLY TOPICALLY TO AFFECTED AREA(S) 2 TIMES DAILY for 7-10 days    Dermatitis       VALTREX 1000 mg tablet   Generic drug:  valACYclovir     12 tablet    Take 2 tablets (2,000 mg) by mouth 2 times daily Dispense as written    Recurrent cold sores

## 2018-01-30 NOTE — PROGRESS NOTES
SUBJECTIVE:   Karen Hernandez is a 67 year old female who presents to clinic today for the following health issues:      Eye(s) Problem  Onset: 1 week    Description:   Location: bilateral, left is worse  Pain: YES and itching  Redness: YES    Accompanying Signs & Symptoms:  Discharge/mattering: YES  Swelling: no   Visual changes: no   Fever: no   Nasal Congestion: no   Bothered by bright lights: no     History:   Trauma: no   Foreign body exposure: no     Precipitating factors:   Wearing contacts: no     Alleviating factors:  Improved by: hot compress    Therapies Tried and outcome: polytrim-improved but it is not going away completely.      Rash  Onset: 3 days- comes and goes- had similar flare around [place] time.    Description:   Location: legs, bra line, antecubital space of left arm  Character: blotchy, raised, red  Itching (Pruritis): only on left arm.    Progression of Symptoms:  Worsening however it does look better since shower    Accompanying Signs & Symptoms:  Fever: no   Body aches or joint pain: no   Sore throat symptoms: no   Recent cold symptoms: no     History:   Previous similar rash: YES- similar itchy rash on neck around [place] time    Precipitating factors:   Exposure to similar rash: no   New exposures: soaps - she got it years ago at a craft fair, has used it the last month without issue, stopped using it this morning   Recent travel: no     Alleviating factors:  shower    Therapies Tried and outcome: jergens lotion, cetifil on left arm as it was itching.      Problem list and histories reviewed & adjusted, as indicated.  Additional history: as documented    Patient Active Problem List   Diagnosis     FHX COLON CANCER - SCOPE Q 5Y     Hyperlipidemia LDL goal <130     AK (actinic keratosis)     Melanocytic nevus     Recurrent cold sores     Herpes simplex of eye     24 hour contact handout given     Loss of height     Dermatitis     Gastroesophageal reflux disease with esophagitis      Essential hypertension with goal blood pressure less than 140/90     Osteopenia     Elevated glucose     Keratosis, seborrheic     Advanced directives, counseling/discussion     Past Surgical History:   Procedure Laterality Date     C APPENDECTOMY  1960     COLONOSCOPY  8/10/2012    Procedure: COLONOSCOPY;  Colonoscopy;  Surgeon: Carlos Alatorre MD;  Location: [facility]     COLONOSCOPY N/A 1/4/2017    Procedure: COLONOSCOPY;  Surgeon: Stepan Samayoa MD;  Location: [facility]     EYE SURGERY  2017     HYSTERECTOMY, PAP NO LONGER INDICATED  2000    Ovaries left     TONSILLECTOMY  1958       Social History   Substance Use Topics     Smoking status: Never Smoker     Smokeless tobacco: Never Used     Alcohol use Yes      Comment: Social-- 6/wk     Family History   Problem Relation Age of Onset     Cancer - colorectal Father      CANCER Daughter      Breast Cancer Daughter      Congenital Anomalies Daughter      trisomy 18     DIABETES No family hx of      Hypertension No family hx of      CEREBROVASCULAR DISEASE No family hx of      Prostate Cancer No family hx of            Reviewed and updated as needed this visit by clinical staff       Reviewed and updated as needed this visit by Provider         ROS:  Constitutional, HEENT, cardiovascular, pulmonary, gi and gu systems are negative, except as otherwise noted.    OBJECTIVE:     /66 (BP Location: Right arm, Patient Position: Sitting, Cuff Size: Adult Large)  Pulse 89  Temp 97.9  F (36.6  C) (Tympanic)  Resp 16  SpO2 98%  There is no height or weight on file to calculate BMI.  GENERAL: healthy, alert and no distress  EYES: Eyes grossly normal to inspection, PERRL, EOMI and conjunctiva/corneas- conjunctival injection OS  MS: no gross musculoskeletal defects noted, no edema  SKIN: plaque - erythematous scaly plaques to bilateral antecubital spaces, bilateral groin and scattered on upper legs and forehead.  NEURO: Normal strength and tone, mentation  intact and speech normal  PSYCH: mentation appears normal, affect normal/bright    Diagnostic Test Results:  none     ASSESSMENT/PLAN:       ICD-10-CM    1. Dermatitis L30.9 DERMATOLOGY REFERRAL   2. Acute conjunctivitis of left eye, unspecified acute conjunctivitis type H10.32 gentamicin (GARAMYCIN) 0.3 % ophthalmic solution       CONSULTATION/REFERRAL to OPHTHALMOLOGY and DERMATOLOGY. Had instructed patient to see her eye doctor if not better in 2 days last week. She states it is improving so she hadn't made the appointment but states she has no vision changes, headache, pain with eye movement. She agrees to see her eye doctor Thursday, will trial changing the antibiotic. Suggest to add cool mist humidifier to bed room and to moisturize with Cetaphil twice daily. Avoid scented and dye containing products. May use the Kenalog cream she was given in December.     FUTURE APPOINTMENTS:       - Follow-up visit PRN new or worsening symptoms.     Patient Instructions       Atopic Dermatitis (Adult)  Atopic dermatitis is a dry, itchy, red rash. It s also called eczema. The rash is chronic, or ongoing. It can come and go over time. The disease is often passed down in families. It causes a problem with the skin barrier that makes the skin more sensitive to the environment and other factors. The increased skin sensitivity causes an itch, which causes scratching. Scratching can worsen the itching or also break the skin. This can put the skin at risk of infection.  The condition is most common in people with asthma, hay fever, hives, or dry or sensitive skin. The rash may be caused by extreme heat or heavy sweating. Skin irritants can cause the rash to flare up. These can include wool or silk clothing, grease, oils, some medicines, and harsh soaps and detergents. Emotional stress can also be a trigger.  Treatment is done to relieve the itching and inflammation of the skin.  Home care  Follow these tips to care for your  condition:    Keep the areas of rash clean by bathing at least every other day. Use lukewarm water to bathe. Don t use hot water, which can dry out the skin.    Don t use soaps with strong detergents. Use mild soaps made for sensitive skin.    Apply a cream or ointment to damp skin right after bathing.    Avoid things that irritate your skin. Wear absorbent, soft fabrics next to the skin rather than rough or scratchy materials.    Use mild laundry soap free of scents and perfumes. Make sure to rinse all the soap out of your clothes.    Treat any skin infection as directed.    Use oral diphenhydramine to help reduce itching. This is an antihistamine you can buy at drug and grocery stores. It can make you sleepy, so use lower doses during the daytime. Or you can use loratadine. This is an antihistamine that will not make you sleepy. Do not use diphenhydramine if you have glaucoma or have trouble urinating due to an enlarged prostate.  Follow-up care  See your healthcare provider, or as advised. If your symptoms don t get better or if they get worse in the next 7 days, make an appointment with your healthcare provider.  When to seek medical advice  Call your healthcare provider right away  if any of these occur:    Increasing area of redness or pain in the skin    Yellow crusts or wet drainage from the rash    Fever of 100.4 F (38 C) or higher, or as directed by your healthcare provider  Date Last Reviewed: 9/1/2016 2000-2017 [employer]. [address] [place] [address], [place] [id number]. All rights reserved. This information is not intended as a substitute for professional medical care. Always follow your healthcare professional's instructions.        Managing Atopic Dermatitis (Eczema)     After bathing, gently pat your skin dry (don t rub). Apply moisturizer while your skin is still damp.   To manage your symptoms and help reduce the severity and frequency, try these self-care tips:  Caring for your  skin    Use a gentle, fragrance-free cleanser (or nonsoap cleanser) for bathing. Rinse well. Pat skin dry.    Take warm, not hot, baths or showers. Try to limit them to no more that 10 to 15 minutes.     Use moisturizer liberally right after you bathe, while your skin is still damp.    Avoid scratching because it will cause more damage to your skin.     Topical, over-the-counter hydrocortisone cream may help control mild symptoms.   Controlling your environment    Avoid extreme heat or cold.    Avoid very humid or very dry air.    If your home or office air is very dry, use a humidifier.    Avoid allergens, such as dust, that may be present in bedding, carpets, plush toys, or rugs.    Know that pet hair and dander can cause flare-ups.  Seeking medical treatment  Another way to keep symptoms under control is to seek medical treatment. Talk with your healthcare provider about the type of treatment that may work best for you. Your provider may prescribe treatments such as the following:    Topical treatments to put on the skin daily    Medicines taken by mouth (oral medicines), such as antihistamines, antibiotics, or corticosteroids    In severe cases shots (injections) may be needed to control the symptoms. You may even need antibiotics if skin infections occur.  Treatments don t work the same way for every person. So if your symptoms continue or get worse, ask your healthcare provider about other treatments.  Making lifestyle choices    Manage the stress in your life.    Wear loose-fitting cotton clothing that does not bind or rub your skin.    Avoid contact with wool or other scratchy fabrics.    Use fragrance-free products.  Getting good results  Now that you know more about atopic dermatitis, the next step is up to you. Follow your healthcare provider s treatment plan and your self-care routine. This will help bring atopic dermatitis under control. If your symptoms persist, be sure to let your health care  provider know.   Date Last Reviewed: 2/1/2017 2000-2017 [employer]. [address] [facility], [place] [address]. All rights reserved. This information is not intended as a substitute for professional medical care. Always follow your healthcare professional's instructions.        Conjunctivitis, Nonspecific    The membrane that covers the white part of your eye (the conjunctiva) is inflamed. Inflammation happens when your body responds to an injury, allergic reaction, infection, or illness. Symptoms of inflammation in the eye may include redness, irritation, itching, swelling, or burning. These symptoms should go away within the next 24 hours. Conjunctivitis may be related to a particle that was in your eye. If so, it may wash out with your tears or irrigation treatment. Being exposed to liquid chemicals or fumes may also cause this reaction.   Home care    Apply a cold pack over the eye for 20 minutes at a time. This will reduce pain. To make a cold pack, put ice cubes in a plastic bag that seals at the top. Wrap the bag in a clean, thin towel or cloth.    Artificial tears may be prescribed to reduce irritation or redness.  These should be used 3 to 4 times a day.    You may use acetaminophen or ibuprofen to control pain, unless another medicine was prescribed. (Note: If you have chronic liver or kidney disease, or if you have ever had a stomach ulcer or gastrointestinal bleeding, talk with your healthcare provider before using these medicines.)  Follow-up care  Follow up with your healthcare provider, or as advised.  When to seek medical advice  Call your healthcare provider right away if any of these occur:    Increased eyelid swelling    Increased eye pain    Increased redness or drainage from the eye    Increased blurry vision or increased sensitivity to light    Failure of normal vision to return within 24 to 48 hours  Date Last Reviewed: 7/1/2017 2000-2017 [employer] [address]  [place] [address]. All rights reserved. This information is not intended as a substitute for professional medical care. Always follow your healthcare professional's instructions.            [facility]

## 2018-01-30 NOTE — NURSING NOTE
"Chief Complaint   Patient presents with     Eye Problem     Derm Problem       Initial /66 (BP Location: Right arm, Patient Position: Sitting, Cuff Size: Adult Large)  Pulse 89  Temp 97.9  F (36.6  C) (Tympanic)  Resp 16  SpO2 98% Estimated body mass index is 25.21 kg/(m^2) as calculated from the following:    Height as of 12/12/17: 5' 3.75\" (1.619 m).    Weight as of 12/12/17: 145 lb 11.2 oz (66.1 kg).  Medication Reconciliation: complete  "

## 2018-01-30 NOTE — PATIENT INSTRUCTIONS
Atopic Dermatitis (Adult)  Atopic dermatitis is a dry, itchy, red rash. It s also called eczema. The rash is chronic, or ongoing. It can come and go over time. The disease is often passed down in families. It causes a problem with the skin barrier that makes the skin more sensitive to the environment and other factors. The increased skin sensitivity causes an itch, which causes scratching. Scratching can worsen the itching or also break the skin. This can put the skin at risk of infection.  The condition is most common in people with asthma, hay fever, hives, or dry or sensitive skin. The rash may be caused by extreme heat or heavy sweating. Skin irritants can cause the rash to flare up. These can include wool or silk clothing, grease, oils, some medicines, and harsh soaps and detergents. Emotional stress can also be a trigger.  Treatment is done to relieve the itching and inflammation of the skin.  Home care  Follow these tips to care for your condition:    Keep the areas of rash clean by bathing at least every other day. Use lukewarm water to bathe. Don t use hot water, which can dry out the skin.    Don t use soaps with strong detergents. Use mild soaps made for sensitive skin.    Apply a cream or ointment to damp skin right after bathing.    Avoid things that irritate your skin. Wear absorbent, soft fabrics next to the skin rather than rough or scratchy materials.    Use mild laundry soap free of scents and perfumes. Make sure to rinse all the soap out of your clothes.    Treat any skin infection as directed.    Use oral diphenhydramine to help reduce itching. This is an antihistamine you can buy at drug and grocery stores. It can make you sleepy, so use lower doses during the daytime. Or you can use loratadine. This is an antihistamine that will not make you sleepy. Do not use diphenhydramine if you have glaucoma or have trouble urinating due to an enlarged prostate.  Follow-up care  See your healthcare  provider, or as advised. If your symptoms don t get better or if they get worse in the next 7 days, make an appointment with your healthcare provider.  When to seek medical advice  Call your healthcare provider right away  if any of these occur:    Increasing area of redness or pain in the skin    Yellow crusts or wet drainage from the rash    Fever of 100.4 F (38 C) or higher, or as directed by your healthcare provider  Date Last Reviewed: 9/1/2016 2000-2017 [employer]. [address] [place] [address]. All rights reserved. This information is not intended as a substitute for professional medical care. Always follow your healthcare professional's instructions.        Managing Atopic Dermatitis (Eczema)     After bathing, gently pat your skin dry (don t rub). Apply moisturizer while your skin is still damp.   To manage your symptoms and help reduce the severity and frequency, try these self-care tips:  Caring for your skin    Use a gentle, fragrance-free cleanser (or nonsoap cleanser) for bathing. Rinse well. Pat skin dry.    Take warm, not hot, baths or showers. Try to limit them to no more that 10 to 15 minutes.     Use moisturizer liberally right after you bathe, while your skin is still damp.    Avoid scratching because it will cause more damage to your skin.     Topical, over-the-counter hydrocortisone cream may help control mild symptoms.   Controlling your environment    Avoid extreme heat or cold.    Avoid very humid or very dry air.    If your home or office air is very dry, use a humidifier.    Avoid allergens, such as dust, that may be present in bedding, carpets, plush toys, or rugs.    Know that pet hair and dander can cause flare-ups.  Seeking medical treatment  Another way to keep symptoms under control is to seek medical treatment. Talk with your healthcare provider about the type of treatment that may work best for you. Your provider may prescribe treatments such as the  following:    Topical treatments to put on the skin daily    Medicines taken by mouth (oral medicines), such as antihistamines, antibiotics, or corticosteroids    In severe cases shots (injections) may be needed to control the symptoms. You may even need antibiotics if skin infections occur.  Treatments don t work the same way for every person. So if your symptoms continue or get worse, ask your healthcare provider about other treatments.  Making lifestyle choices    Manage the stress in your life.    Wear loose-fitting cotton clothing that does not bind or rub your skin.    Avoid contact with wool or other scratchy fabrics.    Use fragrance-free products.  Getting good results  Now that you know more about atopic dermatitis, the next step is up to you. Follow your healthcare provider s treatment plan and your self-care routine. This will help bring atopic dermatitis under control. If your symptoms persist, be sure to let your health care provider know.   Date Last Reviewed: 2/1/2017 2000-2017 The [employer]. [address] [place] [address]. All rights reserved. This information is not intended as a substitute for professional medical care. Always follow your healthcare professional's instructions.        Conjunctivitis, Nonspecific    The membrane that covers the white part of your eye (the conjunctiva) is inflamed. Inflammation happens when your body responds to an injury, allergic reaction, infection, or illness. Symptoms of inflammation in the eye may include redness, irritation, itching, swelling, or burning. These symptoms should go away within the next 24 hours. Conjunctivitis may be related to a particle that was in your eye. If so, it may wash out with your tears or irrigation treatment. Being exposed to liquid chemicals or fumes may also cause this reaction.   Home care    Apply a cold pack over the eye for 20 minutes at a time. This will reduce pain. To make a cold pack, put ice  cubes in a plastic bag that seals at the top. Wrap the bag in a clean, thin towel or cloth.    Artificial tears may be prescribed to reduce irritation or redness.  These should be used 3 to 4 times a day.    You may use acetaminophen or ibuprofen to control pain, unless another medicine was prescribed. (Note: If you have chronic liver or kidney disease, or if you have ever had a stomach ulcer or gastrointestinal bleeding, talk with your healthcare provider before using these medicines.)  Follow-up care  Follow up with your healthcare provider, or as advised.  When to seek medical advice  Call your healthcare provider right away if any of these occur:    Increased eyelid swelling    Increased eye pain    Increased redness or drainage from the eye    Increased blurry vision or increased sensitivity to light    Failure of normal vision to return within 24 to 48 hours  Date Last Reviewed: 7/1/2017 2000-2017 [employer]. [address] [place] [id number]. All rights reserved. This information is not intended as a substitute for professional medical care. Always follow your healthcare professional's instructions.

## 2018-02-05 ENCOUNTER — OFFICE VISIT (OUTPATIENT)
Dept: FAMILY MEDICINE | Facility: CLINIC | Age: 68
End: 2018-02-05
Payer: COMMERCIAL

## 2018-02-05 VITALS
SYSTOLIC BLOOD PRESSURE: 134 MMHG | OXYGEN SATURATION: 97 % | TEMPERATURE: 98.9 F | DIASTOLIC BLOOD PRESSURE: 84 MMHG | HEART RATE: 80 BPM

## 2018-02-05 DIAGNOSIS — I10 ESSENTIAL HYPERTENSION WITH GOAL BLOOD PRESSURE LESS THAN 140/90: ICD-10-CM

## 2018-02-05 DIAGNOSIS — J06.9 UPPER RESPIRATORY TRACT INFECTION, UNSPECIFIED TYPE: Primary | ICD-10-CM

## 2018-02-05 DIAGNOSIS — J20.8 ACUTE BRONCHITIS DUE TO OTHER SPECIFIED ORGANISMS: ICD-10-CM

## 2018-02-05 PROCEDURE — 99214 OFFICE O/P EST MOD 30 MIN: CPT | Performed by: NURSE PRACTITIONER

## 2018-02-05 RX ORDER — CODEINE PHOSPHATE AND GUAIFENESIN 10; 100 MG/5ML; MG/5ML
1 SOLUTION ORAL EVERY 4 HOURS PRN
Qty: 240 ML | Refills: 0 | Status: SHIPPED | OUTPATIENT
Start: 2018-02-05 | End: 2018-09-18

## 2018-02-05 RX ORDER — LOSARTAN POTASSIUM 50 MG/1
50 TABLET ORAL DAILY
Qty: 30 TABLET | Refills: 1 | Status: SHIPPED | OUTPATIENT
Start: 2018-02-05 | End: 2018-03-13

## 2018-02-05 NOTE — PROGRESS NOTES
SUBJECTIVE:   Karen Hernandez is a 67 year old female who presents to clinic today for the following health issues:              Symptoms: cc Present Absent Comment   Fever/Chills   x    Fatigue  x  Has been feeling tired since before Christmas    Headache   x    Muscle or Body  Aches   x    Eye Irritation   x    Sneezing   x    Nasal Franc/Drg  x  Nose is stuffy     Sinus Pressure/Pain   x    Dental pain   x    Sore Throat x   Throat hurts to swallow. l  Grandson does have cold. This is starting to affect her voice    Swollen Glands       Ear Pain/Fullness   x    Cough x   2 months, decreased and has increased again this past weekend.,  Cough non-productive,     Wheeze   x    Chest Discomfort  x   from coughing    Shortness of breath   x    Abdominal pain   x    Emesis    x    Diarrhea   x    Other x   Did have pink eye x 2.    Did have a bad rash on the chest and arms.  Will come and go .       Symptom duration:  2 months   Symptom severity:  is not getting better    Treatments tried:  no   Contacts:  yes         Her  had double pneumonia around [place]      Is still exercising     Problem list and histories reviewed & adjusted, as indicated.  Additional history: as documented    Patient Active Problem List   Diagnosis     FHX COLON CANCER - SCOPE Q 5Y     Hyperlipidemia LDL goal <130     AK (actinic keratosis)     Melanocytic nevus     Recurrent cold sores     Herpes simplex of eye     24 hour contact handout given     Loss of height     Dermatitis     Gastroesophageal reflux disease with esophagitis     Essential hypertension with goal blood pressure less than 140/90     Osteopenia     Elevated glucose     Keratosis, seborrheic     Advanced directives, counseling/discussion     Past Surgical History:   Procedure Laterality Date     C APPENDECTOMY  1960     COLONOSCOPY  8/10/2012    Procedure: COLONOSCOPY;  Colonoscopy;  Surgeon: Carlos Alatorre MD;  Location: [facility]     COLONOSCOPY N/A 1/4/2017     Procedure: COLONOSCOPY;  Surgeon: Stepan Samayoa MD;  Location: [facility]     EYE SURGERY  2017     HYSTERECTOMY, PAP NO LONGER INDICATED  2000    Ovaries left     TONSILLECTOMY  1958       Social History   Substance Use Topics     Smoking status: Never Smoker     Smokeless tobacco: Never Used     Alcohol use Yes      Comment: Social-- 6/wk     Family History   Problem Relation Age of Onset     Cancer - colorectal Father      CANCER Daughter      Breast Cancer Daughter      Congenital Anomalies Daughter      trisomy 18     DIABETES No family hx of      Hypertension No family hx of      CEREBROVASCULAR DISEASE No family hx of      Prostate Cancer No family hx of          Current Outpatient Prescriptions   Medication Sig Dispense Refill     losartan (COZAAR) 50 MG tablet Take 1 tablet (50 mg) by mouth daily 30 tablet 1     triamcinolone (KENALOG) 0.1 % cream APPLY TOPICALLY TO AFFECTED AREA(S) 2 TIMES DAILY for 7-10 days 30 g 1     VALTREX 1 G tablet Take 2 tablets (2,000 mg) by mouth 2 times daily Dispense as written 12 tablet 1     pantoprazole (PROTONIX) 40 MG EC tablet TAKE 1 TABLET (40 MG) BY MOUTH DAILY 90 tablet 3     selenium sulfide (SELSUN) 2.5 % shampoo Apply  topically daily as needed for itching. 120 mL 0     Allergies   Allergen Reactions     Nkda [No Known Drug Allergies]      BP Readings from Last 3 Encounters:   02/05/18 134/84   01/30/18 138/66   01/25/18 132/82    Wt Readings from Last 3 Encounters:   12/12/17 145 lb 11.2 oz (66.1 kg)   10/17/17 146 lb 9.6 oz (66.5 kg)   04/03/17 146 lb (66.2 kg)                    Reviewed and updated as needed this visit by clinical staff  Allergies  Meds       Reviewed and updated as needed this visit by Provider         ROS:  See notes above.     OBJECTIVE:     /84  Pulse 80  Temp 98.9  F (37.2  C) (Tympanic)  SpO2 97%  There is no height or weight on file to calculate BMI.   GENERAL: alert, no distress and fatigued  HENT: right ear: clear  effusion, left ear: clear effusion, nasal mucosa edematous , rhinorrhea clear and yellow, oropharynx clear, oral mucous membranes moist, tonsillar hypertrophy, tonsillar erythema and does have PND   NECK: no adenopathy, no asymmetry, masses, or scars and thyroid normal to palpation  RESP: lungs clear to auscultation - no rales, rhonchi or wheezes  CV: regular rate and rhythm, normal S1 S2, no S3 or S4, no murmur, click or rub, no peripheral edema and peripheral pulses strong    Diagnostic Test Results:  none     ASSESSMENT/PLAN:     ASSESSMENT/PLAN:      ICD-10-CM    1. Upper respiratory tract infection, unspecified type J06.9 amoxicillin-clavulanate (AUGMENTIN) 875-125 MG per tablet   2. Acute bronchitis due to other specified organisms J20.8 amoxicillin-clavulanate (AUGMENTIN) 875-125 MG per tablet     guaiFENesin-codeine (ROBITUSSIN AC) 100-10 MG/5ML SOLN solution   3. Essential hypertension with goal blood pressure less than 140/90 I10 losartan (COZAAR) 50 MG tablet       Patient Instructions   I did change your blood pressure pill to  Losartan   50 mg .  Monitor your blood pressure and let me know if you are  Tolerating it and your blood pressure is well controlled.  ( 130/90 or below )   If all is good. I will then order  90 days.     For the sore throat use warm tea and honey or even just spoonful of honey and hold it to the back of the throat. This will help to decrease the inflammation and thin the mucous.       Start the Augmentin TAKE WITH FOOD>   This is the antibiotic that should be working well.       Check on the 2nd shingles shot. You can get that at the pharmacy  Or the clinic       ENJOY  [place]                 See Patient Instructions    AGUS BARR NP, APRN [facility]

## 2018-02-05 NOTE — MR AVS SNAPSHOT
After Visit Summary   2/5/2018    Karen Hernandez    MRN: [id number]           Patient Information     Date Of Birth          1950        Visit Information        Provider Department      2/5/2018 1:00 PM Sania Sr APRN [facility]        Today's Diagnoses     Upper respiratory tract infection, unspecified type    -  1    Acute bronchitis due to other specified organisms        Essential hypertension with goal blood pressure less than 140/90          Care Instructions    I did change your blood pressure pill to  Losartan   50 mg .  Monitor your blood pressure and let me know if you are  Tolerating it and your blood pressure is well controlled.  ( 130/90 or below )   If all is good. I will then order  90 days.     For the sore throat use warm tea and honey or even just spoonful of honey and hold it to the back of the throat. This will help to decrease the inflammation and thin the mucous.       Start the Augmentin TAKE WITH FOOD>   This is the antibiotic that should be working well.       Check on the 2nd shingles shot. You can get that at the pharmacy  Or the clinic             Follow-ups after your visit        Who to contact     Normal or non-critical lab and imaging results will be communicated to you by WatchFrog, letter or phone within 4 business days after the clinic has received the results. If you do not hear from us within 7 days, please contact the clinic through World BXt or phone. If you have a critical or abnormal lab result, we will notify you by phone as soon as possible.  Submit refill requests through WatchFrog or call your pharmacy and they will forward the refill request to us. Please allow 3 business days for your refill to be completed.          If you need to speak with a  for additional information , please call: [phone]           Additional Information About Your Visit        [employer] Information     WatchFrog gives you secure  access to your electronic health record. If you see a primary care provider, you can also send messages to your care team and make appointments. If you have questions, please call your primary care clinic.  If you do not have a primary care provider, please call [phone] and they will assist you.        Care EveryWhere ID     This is your Care EveryWhere ID. This could be used by other organizations to access your [place] medical records  [id number]        Your Vitals Were     Pulse Temperature Pulse Oximetry             80 98.9  F (37.2  C) (Tympanic) 97%          Blood Pressure from Last 3 Encounters:   02/05/18 134/84   01/30/18 138/66   01/25/18 132/82    Weight from Last 3 Encounters:   12/12/17 145 lb 11.2 oz (66.1 kg)   10/17/17 146 lb 9.6 oz (66.5 kg)   04/03/17 146 lb (66.2 kg)              Today, you had the following     No orders found for display         Today's Medication Changes          These changes are accurate as of 2/5/18  1:35 PM.  If you have any questions, ask your nurse or doctor.               Start taking these medicines.        Dose/Directions    amoxicillin-clavulanate 875-125 MG per tablet   Commonly known as:  AUGMENTIN   Used for:  Upper respiratory tract infection, unspecified type, Acute bronchitis due to other specified organisms        Dose:  1 tablet   Take 1 tablet by mouth 2 times daily TAKE WITH FOOD   Quantity:  20 tablet   Refills:  0       losartan 50 MG tablet   Commonly known as:  COZAAR   Used for:  Essential hypertension with goal blood pressure less than 140/90        Dose:  50 mg   Take 1 tablet (50 mg) by mouth daily   Quantity:  30 tablet   Refills:  1         Stop taking these medicines if you haven't already. Please contact your care team if you have questions.     lisinopril 20 MG tablet   Commonly known as:  PRINIVIL/ZESTRIL                Where to get your medicines      These medications were sent to [place] [id number] [place] [address]  [address]  [address] [facility] [address]     Phone:  [phone]     amoxicillin-clavulanate 875-125 MG per tablet    losartan 50 MG tablet                Primary Care Provider Office Phone # Fax #    Sania Sr, ABA [facility] [phone] [address]   [place] [address]        Equal Access to Services     [facility] Center[facility] : Hadii aad ku hadasho Soomaali, waaxda luqadaha, qaybta kaalmada adeegyada, waxay idiin hayaan adeeg kharash la'aan ah. So [facility] [phone].    ATENCIÓN: Si habla español, tiene a gallagher disposición servicios gratuitos de asistencia lingüística. [facility] [phone].    We comply with applicable federal civil rights laws and [place] laws. We do not discriminate on the basis of race, color, national origin, age, disability, sex, sexual orientation, or gender identity.            Thank you!     Thank you for choosing [facility]  for your care. Our goal is always to provide you with excellent care. Hearing back from our patients is one way we can continue to improve our services. Please take a few minutes to complete the written survey that you may receive in the mail after your visit with us. Thank you!             Your Updated Medication List - Protect others around you: Learn how to safely use, store and throw away your medicines at www.disposemymeds.org.          This list is accurate as of 2/5/18  1:35 PM.  Always use your most recent med list.                   Brand Name Dispense Instructions for use Diagnosis    amoxicillin-clavulanate 875-125 MG per tablet    AUGMENTIN    20 tablet    Take 1 tablet by mouth 2 times daily TAKE WITH FOOD    Upper respiratory tract infection, unspecified type, Acute bronchitis due to other specified organisms       losartan 50 MG tablet    COZAAR    30 tablet    Take 1 tablet (50 mg) by mouth daily    Essential hypertension with goal blood pressure less than 140/90       pantoprazole 40 MG EC tablet    PROTONIX     90 tablet    TAKE 1 TABLET (40 MG) BY MOUTH DAILY    Gastroesophageal reflux disease with esophagitis       selenium sulfide 2.5 % lotion    SELSUN    120 mL    Apply  topically daily as needed for itching.    Fungal skin infection       triamcinolone 0.1 % cream    KENALOG    30 g    APPLY TOPICALLY TO AFFECTED AREA(S) 2 TIMES DAILY for 7-10 days    Dermatitis       VALTREX 1000 mg tablet   Generic drug:  valACYclovir     12 tablet    Take 2 tablets (2,000 mg) by mouth 2 times daily Dispense as written    Recurrent cold sores

## 2018-02-05 NOTE — NURSING NOTE
"Chief Complaint   Patient presents with     Pharyngitis       Initial /84  Pulse 80  Temp 98.9  F (37.2  C) (Tympanic)  SpO2 97% Estimated body mass index is 25.21 kg/(m^2) as calculated from the following:    Height as of 12/12/17: 5' 3.75\" (1.619 m).    Weight as of 12/12/17: 145 lb 11.2 oz (66.1 kg).  Medication Reconciliation: complete   Margaret Leiva CMA    "

## 2018-02-05 NOTE — PATIENT INSTRUCTIONS
I did change your blood pressure pill to  Losartan   50 mg .  Monitor your blood pressure and let me know if you are  Tolerating it and your blood pressure is well controlled.  ( 130/90 or below )   If all is good. I will then order  90 days.     For the sore throat use warm tea and honey or even just spoonful of honey and hold it to the back of the throat. This will help to decrease the inflammation and thin the mucous.       Start the Augmentin TAKE WITH FOOD>   This is the antibiotic that should be working well.       Check on the 2nd shingles shot. You can get that at the pharmacy  Or the clinic       ENJOY  [place]

## 2018-03-13 ENCOUNTER — ALLIED HEALTH/NURSE VISIT (OUTPATIENT)
Dept: FAMILY MEDICINE | Facility: CLINIC | Age: 68
End: 2018-03-13

## 2018-03-13 VITALS — DIASTOLIC BLOOD PRESSURE: 78 MMHG | SYSTOLIC BLOOD PRESSURE: 128 MMHG

## 2018-03-13 DIAGNOSIS — I10 ESSENTIAL HYPERTENSION WITH GOAL BLOOD PRESSURE LESS THAN 140/90: Primary | ICD-10-CM

## 2018-03-13 PROCEDURE — 99207 ZZC NO CHARGE NURSE ONLY: CPT | Performed by: NURSE PRACTITIONER

## 2018-03-13 RX ORDER — LOSARTAN POTASSIUM 50 MG/1
50 TABLET ORAL DAILY
Qty: 90 TABLET | Refills: 3 | Status: SHIPPED | OUTPATIENT
Start: 2018-03-13 | End: 2019-03-16

## 2018-03-13 NOTE — MR AVS SNAPSHOT
After Visit Summary   3/13/2018    Karen Hernandez    MRN: [id number]           Patient Information     Date Of Birth          1950        Visit Information        Provider Department      3/13/2018 10:57 AM Sania Sr APRN [facility]        Today's Diagnoses     Essential hypertension with goal blood pressure less than 140/90    -  1       Follow-ups after your visit        Who to contact     Normal or non-critical lab and imaging results will be communicated to you by Pazienhart, letter or phone within 4 business days after the clinic has received the results. If you do not hear from us within 7 days, please contact the clinic through Pazienhart or phone. If you have a critical or abnormal lab result, we will notify you by phone as soon as possible.  Submit refill requests through Ringadoc or call your pharmacy and they will forward the refill request to us. Please allow 3 business days for your refill to be completed.          If you need to speak with a  for additional information , please call: [phone]           Additional Information About Your Visit        P[employer]ienharGame[employer]ola Information     Ringadoc gives you secure access to your electronic health record. If you see a primary care provider, you can also send messages to your care team and make appointments. If you have questions, please call your primary care clinic.  If you do not have a primary care provider, please call [phone] and they will assist you.        Care EveryWhere ID     This is your Care EveryWhere ID. This could be used by other organizations to access your [place] medical records  [id number]         Blood Pressure from Last 3 Encounters:   03/13/18 128/78   02/05/18 134/84   01/30/18 138/66    Weight from Last 3 Encounters:   12/12/17 145 lb 11.2 oz (66.1 kg)   10/17/17 146 lb 9.6 oz (66.5 kg)   04/03/17 146 lb (66.2 kg)              Today, you had the following     No orders  found for display       Primary Care Provider Office Phone # Fax #    Sania Bello Jennie Remberto, APRN [facility] [phone] [address] [place] [address]        Equal Access to Services     MILLY RAMOS : Hadii rufino ku michaelo Somykeali, waaxda luqadaha, qaybta kaalmada adeegyada, waxjessica idiin gordon gregory campa lagiovanni morales. So [facility] [phone].    ATENCIÓN: Si habla español, tiene a gallagher disposición servicios gratuitos de asistencia lingüística. Llame al [phone].    We comply with applicable federal civil rights laws and [place] laws. We do not discriminate on the basis of race, color, national origin, age, disability, sex, sexual orientation, or gender identity.            Thank you!     Thank you for choosing [facility]  for your care. Our goal is always to provide you with excellent care. Hearing back from our patients is one way we can continue to improve our services. Please take a few minutes to complete the written survey that you may receive in the mail after your visit with us. Thank you!             Your Updated Medication List - Protect others around you: Learn how to safely use, store and throw away your medicines at www.disposemymeds.org.          This list is accurate as of 3/13/18 10:58 AM.  Always use your most recent med list.                   Brand Name Dispense Instructions for use Diagnosis    amoxicillin-clavulanate 875-125 MG per tablet    AUGMENTIN    20 tablet    Take 1 tablet by mouth 2 times daily TAKE WITH FOOD    Upper respiratory tract infection, unspecified type, Acute bronchitis due to other specified organisms       guaiFENesin-codeine 100-10 MG/5ML Soln solution    ROBITUSSIN AC    240 mL    Take 5 mLs by mouth every 4 hours as needed for cough    Acute bronchitis due to other specified organisms       losartan 50 MG tablet    COZAAR    30 tablet    Take 1 tablet (50 mg) by mouth daily    Essential hypertension with goal blood pressure less than  140/90       pantoprazole 40 MG EC tablet    PROTONIX    90 tablet    TAKE 1 TABLET (40 MG) BY MOUTH DAILY    Gastroesophageal reflux disease with esophagitis       selenium sulfide 2.5 % lotion    SELSUN    120 mL    Apply  topically daily as needed for itching.    Fungal skin infection       triamcinolone 0.1 % cream    KENALOG    30 g    APPLY TOPICALLY TO AFFECTED AREA(S) 2 TIMES DAILY for 7-10 days    Dermatitis       VALTREX 1000 mg tablet   Generic drug:  valACYclovir     12 tablet    Take 2 tablets (2,000 mg) by mouth 2 times daily Dispense as written    Recurrent cold sores

## 2018-03-13 NOTE — PROGRESS NOTES
Karen Hernandez is enrolled/participating in the retail pharmacy Blood Pressure Goals Achievement Program (BPGAP).  Karen Hernandez was evaluated at [facility] on March 13, 2018 at which time her blood pressure was:    BP Readings from Last 3 Encounters:   03/13/18 128/78   02/05/18 134/84   01/30/18 138/66     Reviewed lifestyle modifications for blood pressure control and reduction: including making healthy food choices, managing weight, getting regular exercise, smoking cessation, reducing alcohol consumption, monitoring blood pressure regularly.     Karen Hernandez is not experiencing symptoms.    Follow-Up: BP is at goal of < 140/90mmHg (patient 18+ years of age with or without diabetes).  Recommended follow-up at pharmacy in 6 months.     Recommendation to Provider: none    Karen Hernandez was evaluated for enrollment into the PGEN study today.    Patient eligible for enrollment:  No  Patient interested in enrollment:  No    Completed by: Sonu Wright, Pharmacist  [facility]

## 2018-04-24 ENCOUNTER — HOSPITAL ENCOUNTER (OUTPATIENT)
Dept: BONE DENSITY | Facility: CLINIC | Age: 68
Discharge: HOME | End: 2018-04-24
Attending: NURSE PRACTITIONER | Admitting: NURSE PRACTITIONER
Payer: MEDICARE

## 2018-04-24 ENCOUNTER — HOSPITAL ENCOUNTER (OUTPATIENT)
Dept: MAMMOGRAPHY | Facility: CLINIC | Age: 68
End: 2018-04-24
Attending: NURSE PRACTITIONER
Payer: MEDICARE

## 2018-04-24 DIAGNOSIS — Z12.31 VISIT FOR SCREENING MAMMOGRAM: ICD-10-CM

## 2018-04-24 DIAGNOSIS — Z78.0 POSTMENOPAUSAL STATUS: ICD-10-CM

## 2018-04-24 PROCEDURE — 77067 SCR MAMMO BI INCL CAD: CPT

## 2018-04-24 PROCEDURE — 77080 DXA BONE DENSITY AXIAL: CPT

## 2018-08-02 DIAGNOSIS — K21.00 GASTROESOPHAGEAL REFLUX DISEASE WITH ESOPHAGITIS: ICD-10-CM

## 2018-08-02 RX ORDER — PANTOPRAZOLE SODIUM 40 MG/1
TABLET, DELAYED RELEASE ORAL
Qty: 90 TABLET | Refills: 3 | Status: SHIPPED | OUTPATIENT
Start: 2018-08-02 | End: 2019-07-15

## 2018-08-02 NOTE — TELEPHONE ENCOUNTER
"Requested Prescriptions   Pending Prescriptions Disp Refills     pantoprazole (PROTONIX) 40 MG EC tablet [Pharmacy Med Name: PANTOPRAZOLE SOD DR 40 MG TAB] 90 tablet 3    Last Written Prescription Date:  08/06/2017 #90 x 3  Last filled 05/02/2018  Last office visit: 02/05/2018 CHER Sr   Future Office Visit:  None   Sig: TAKE 1 TABLET (40 MG) BY MOUTH DAILY    PPI Protocol Passed    8/2/2018  1:40 AM       Passed - Not on Clopidogrel (unless Pantoprazole ordered)       Passed - No diagnosis of osteoporosis on record       Passed - Recent (12 mo) or future (30 days) visit within the authorizing provider's specialty    Patient had office visit in the last 12 months or has a visit in the next 30 days with authorizing provider or within the authorizing provider's specialty.  See \"Patient Info\" tab in inbasket, or \"Choose Columns\" in Meds & Orders section of the refill encounter.           Passed - Patient is age 18 or older       Passed - No active pregnacy on record       Passed - No positive pregnancy test in past 12 months          "

## 2018-08-02 NOTE — TELEPHONE ENCOUNTER
Prescription approved per [facility] Refill Protocol or patient Primary care provider (PCP)  NOVA Gomez RN/Stewart Atkinson

## 2018-09-18 ENCOUNTER — OFFICE VISIT (OUTPATIENT)
Dept: OTOLARYNGOLOGY | Facility: CLINIC | Age: 68
End: 2018-09-18
Payer: COMMERCIAL

## 2018-09-18 VITALS
HEIGHT: 64 IN | SYSTOLIC BLOOD PRESSURE: 138 MMHG | TEMPERATURE: 98.5 F | WEIGHT: 144 LBS | DIASTOLIC BLOOD PRESSURE: 84 MMHG | BODY MASS INDEX: 24.59 KG/M2 | HEART RATE: 91 BPM

## 2018-09-18 DIAGNOSIS — R04.0 EPISTAXIS: Primary | ICD-10-CM

## 2018-09-18 PROCEDURE — 30901 CONTROL OF NOSEBLEED: CPT | Performed by: OTOLARYNGOLOGY

## 2018-09-18 PROCEDURE — 99207 ZZC DROP WITH A PROCEDURE: CPT | Performed by: OTOLARYNGOLOGY

## 2018-09-18 ASSESSMENT — PAIN SCALES - GENERAL: PAINLEVEL: NO PAIN (0)

## 2018-09-18 NOTE — LETTER
9/18/2018         RE: Karen Hernandez  [address] [place] [id number]        Dear Colleague,    Thank you for referring your patient, Karen Hernandez, to the [facility]. Please see a copy of my visit note below.    History of Present Illness - Karen Hernandez is a 68 year old female I have seen in the past for a dry cough. Today she presents with recurrent epistaxis. She has been experiencing left epistaxis every day for the past week. She denies prior trouble with epistaxis. She denies nasal trauma or nasal steroids. She is able to stop the bleeding with digital pressure.     Past Medical History -   Patient Active Problem List   Diagnosis     FHX COLON CANCER - SCOPE Q 5Y     Hyperlipidemia LDL goal <130     AK (actinic keratosis)     Melanocytic nevus     Recurrent cold sores     Herpes simplex of eye     24 hour contact handout given     Loss of height     Dermatitis     Gastroesophageal reflux disease with esophagitis     Essential hypertension with goal blood pressure less than 140/90     Osteopenia     Elevated glucose     Keratosis, seborrheic     Advanced directives, counseling/discussion       Current Medications -   Current Outpatient Prescriptions:      pantoprazole (PROTONIX) 40 MG EC tablet, TAKE 1 TABLET (40 MG) BY MOUTH DAILY, Disp: 90 tablet, Rfl: 3     selenium sulfide (SELSUN) 2.5 % shampoo, Apply  topically daily as needed for itching., Disp: 120 mL, Rfl: 0     triamcinolone (KENALOG) 0.1 % cream, APPLY TOPICALLY TO AFFECTED AREA(S) 2 TIMES DAILY for 7-10 days, Disp: 30 g, Rfl: 1     VALTREX 1 G tablet, Take 2 tablets (2,000 mg) by mouth 2 times daily Dispense as written, Disp: 12 tablet, Rfl: 1     losartan (COZAAR) 50 MG tablet, Take 1 tablet (50 mg) by mouth daily, Disp: 90 tablet, Rfl: 3    Allergies -   Allergies   Allergen Reactions     Nkda [No Known Drug Allergies]        Social History -   Social History     Social History     Marital status:      Spouse name:  "Marko Hernandez     Number of children: 3     Years of education: N/A     Occupational History      [employer]     Social History Main Topics     Smoking status: Never Smoker     Smokeless tobacco: Never Used     Alcohol use Yes      Comment: Social-- 6/wk     Drug use: No     Sexual activity: Yes     Partners: Male     Birth control/ protection: Surgical     Other Topics Concern     Parent/Sibling W/ Cabg, Mi Or Angioplasty Before 65f 55m? No     Social History Narrative       Family History -   Family History   Problem Relation Age of Onset     Cancer - colorectal Father      Cancer Daughter      Breast Cancer Daughter      Congenital Anomalies Daughter      trisomy 18     Diabetes No family hx of      Hypertension No family hx of      Cerebrovascular Disease No family hx of      Prostate Cancer No family hx of        Review of Systems - As per HPI and PMHx, otherwise 7 system review of the head and neck negative. 10+ system review negative.    Exam:  /84 (BP Location: Right arm, Patient Position: Right side, Cuff Size: Adult Regular)  Pulse 91  Temp 98.5  F (36.9  C) (Oral)  Ht 1.626 m (5' 4\")  Wt 65.3 kg (144 lb)  BMI 24.72 kg/m2  General - The patient is well nourished and well developed, and appears to have good nutritional status.  Alert and oriented to person and place, answers questions and cooperates with examination appropriately.   Head and Face - Normocephalic and atraumatic, with no gross asymmetry noted of the contour of the facial features.  The facial nerve is intact, with strong symmetric movements.  Eyes - Extraocular movements intact.  Sclera were not icteric or injected, conjunctiva were pink and moist.  Nose - examination of the left nostril reveals an area or recent blood on the caudal septum, as well as a raised blood vessel in the center portion of the caudal septum.      Nasal Cautery - Options were explained to the patient regarding conservative measures " versus nasal cautery in the clinic today.  The patient wished to proceed with cautery.  I placed a small piece of cotton soaked in 4% liquid lidocaine in the anterior nasal cavity over the area of prominent vessels.  This was left in place for 10 minutes.  I then proceeded to remove the cotton, and applied silver nitrate to the vessels, starting distally, and working my way back to the vessels  point of entry onto the nasal mucosa.  The patient tolerated the procedure well.    A/P  - The patient has been cauterized today for epistaxis.  I counseled them on avoiding trauma to the nose for the next 3 days. I advised that they can then use saline spray to keep the nose moisturized. If there is any further troublesome bleeding, I recommended they pinch the soft part of their nose with their fingers, and lean forward. If blood escapes from the front, then they should reposition their pinch to better secure the nose. Once the bleeding has been stopped with pinching, hold the pinch for 10 minutes. If bleeding persists despite this, then I recommend proceeding to the Emergency Department. However, if the bleeding is controlled, please arrange a followup appointment with my office 2 weeks or more after the day in which the nose was cauterized.    Dr. Deyanira Coleman MD  Otolaryngology  [facility]      Again, thank you for allowing me to participate in the care of your patient.        Sincerely,        Deyanira Coleman MD

## 2018-09-18 NOTE — PROGRESS NOTES
History of Present Illness - Karen Heranndez is a 68 year old female I have seen in the past for a dry cough. Today she presents with recurrent epistaxis. She has been experiencing left epistaxis every day for the past week. She denies prior trouble with epistaxis. She denies nasal trauma or nasal steroids. She is able to stop the bleeding with digital pressure.     Past Medical History -   Patient Active Problem List   Diagnosis     FHX COLON CANCER - SCOPE Q 5Y     Hyperlipidemia LDL goal <130     AK (actinic keratosis)     Melanocytic nevus     Recurrent cold sores     Herpes simplex of eye     24 hour contact handout given     Loss of height     Dermatitis     Gastroesophageal reflux disease with esophagitis     Essential hypertension with goal blood pressure less than 140/90     Osteopenia     Elevated glucose     Keratosis, seborrheic     Advanced directives, counseling/discussion       Current Medications -   Current Outpatient Prescriptions:      pantoprazole (PROTONIX) 40 MG EC tablet, TAKE 1 TABLET (40 MG) BY MOUTH DAILY, Disp: 90 tablet, Rfl: 3     selenium sulfide (SELSUN) 2.5 % shampoo, Apply  topically daily as needed for itching., Disp: 120 mL, Rfl: 0     triamcinolone (KENALOG) 0.1 % cream, APPLY TOPICALLY TO AFFECTED AREA(S) 2 TIMES DAILY for 7-10 days, Disp: 30 g, Rfl: 1     VALTREX 1 G tablet, Take 2 tablets (2,000 mg) by mouth 2 times daily Dispense as written, Disp: 12 tablet, Rfl: 1     losartan (COZAAR) 50 MG tablet, Take 1 tablet (50 mg) by mouth daily, Disp: 90 tablet, Rfl: 3    Allergies -   Allergies   Allergen Reactions     Nkda [No Known Drug Allergies]        Social History -   Social History     Social History     Marital status:      Spouse name: Marko Hernandez     Number of children: 3     Years of education: N/A     Occupational History      [employer]     Social History Main Topics     Smoking status: Never Smoker     Smokeless tobacco: Never Used      "Alcohol use Yes      Comment: Social-- 6/wk     Drug use: No     Sexual activity: Yes     Partners: Male     Birth control/ protection: Surgical     Other Topics Concern     Parent/Sibling W/ Cabg, Mi Or Angioplasty Before 65f 55m? No     Social History Narrative       Family History -   Family History   Problem Relation Age of Onset     Cancer - colorectal Father      Cancer Daughter      Breast Cancer Daughter      Congenital Anomalies Daughter      trisomy 18     Diabetes No family hx of      Hypertension No family hx of      Cerebrovascular Disease No family hx of      Prostate Cancer No family hx of        Review of Systems - As per HPI and PMHx, otherwise 7 system review of the head and neck negative. 10+ system review negative.    Exam:  /84 (BP Location: Right arm, Patient Position: Right side, Cuff Size: Adult Regular)  Pulse 91  Temp 98.5  F (36.9  C) (Oral)  Ht 1.626 m (5' 4\")  Wt 65.3 kg (144 lb)  BMI 24.72 kg/m2  General - The patient is well nourished and well developed, and appears to have good nutritional status.  Alert and oriented to person and place, answers questions and cooperates with examination appropriately.   Head and Face - Normocephalic and atraumatic, with no gross asymmetry noted of the contour of the facial features.  The facial nerve is intact, with strong symmetric movements.  Eyes - Extraocular movements intact.  Sclera were not icteric or injected, conjunctiva were pink and moist.  Nose - examination of the left nostril reveals an area or recent blood on the caudal septum, as well as a raised blood vessel in the center portion of the caudal septum.      Nasal Cautery - Options were explained to the patient regarding conservative measures versus nasal cautery in the clinic today.  The patient wished to proceed with cautery.  I placed a small piece of cotton soaked in 4% liquid lidocaine in the anterior nasal cavity over the area of prominent vessels.  This was left in " place for 10 minutes.  I then proceeded to remove the cotton, and applied silver nitrate to the vessels, starting distally, and working my way back to the vessels  point of entry onto the nasal mucosa.  The patient tolerated the procedure well.    A/P  - The patient has been cauterized today for epistaxis.  I counseled them on avoiding trauma to the nose for the next 3 days. I advised that they can then use saline spray to keep the nose moisturized. If there is any further troublesome bleeding, I recommended they pinch the soft part of their nose with their fingers, and lean forward. If blood escapes from the front, then they should reposition their pinch to better secure the nose. Once the bleeding has been stopped with pinching, hold the pinch for 10 minutes. If bleeding persists despite this, then I recommend proceeding to the Emergency Department. However, if the bleeding is controlled, please arrange a followup appointment with my office 2 weeks or more after the day in which the nose was cauterized.    Dr. Deyanira Coleman MD  Otolaryngology  [facility]

## 2018-09-18 NOTE — MR AVS SNAPSHOT
After Visit Summary   9/18/2018    Karen Hernandez    MRN: [id number]           Patient Information     Date Of Birth          1950        Visit Information        Provider Department      9/18/2018 2:45 PM Deyanira Coleman MD [facility]        Today's Diagnoses     Epistaxis    -  1      Care Instructions    Per Physician's instructions            Follow-ups after your visit        Your next 10 appointments already scheduled     Nov 05, 2018 10:00 AM CST   New Visit with Leona Cage PA-C   [facility]    [address] [address]   [phone]              Who to contact     If you have questions or need follow up information about today's clinic visit or your schedule please contact [facility] directly at [phone].  Normal or non-critical lab and imaging results will be communicated to you by MyChart, letter or phone within 4 business days after the clinic has received the results. If you do not hear from us within 7 days, please contact the clinic through MyChart or phone. If you have a critical or abnormal lab result, we will notify you by phone as soon as possible.  Submit refill requests through [facility] or call your pharmacy and they will forward the refill request to us. Please allow 3 business days for your refill to be completed.          Additional Information About Your Visit        MyChart Information     [facility] gives you secure access to your electronic health record. If you see a primary care provider, you can also send messages to your care team and make appointments. If you have questions, please call your primary care clinic.  If you do not have a primary care provider, please call [phone] and they will assist you.        Care EveryWhere ID     This is your Care EveryWhere ID. This could be used by other organizations to access your [place] medical records  [id number]       "  Your Vitals Were     Pulse Temperature Height BMI (Body Mass Index)          91 98.5  F (36.9  C) (Oral) 1.626 m (5' 4\") 24.72 kg/m2         Blood Pressure from Last 3 Encounters:   09/18/18 138/84   03/13/18 128/78   02/05/18 134/84    Weight from Last 3 Encounters:   09/18/18 65.3 kg (144 lb)   12/12/17 66.1 kg (145 lb 11.2 oz)   10/17/17 66.5 kg (146 lb 9.6 oz)              We Performed the Following     Nasal Cautery Simple Unilat          Today's Medication Changes          These changes are accurate as of 9/18/18  5:21 PM.  If you have any questions, ask your nurse or doctor.               Stop taking these medicines if you haven't already. Please contact your care team if you have questions.     amoxicillin-clavulanate 875-125 MG per tablet   Commonly known as:  AUGMENTIN   Stopped by:  Deyanira Coleman MD           guaiFENesin-codeine 100-10 MG/5ML Soln solution   Commonly known as:  ROBITUSSIN AC   Stopped by:  Deyanira Coleman MD                    Primary Care Provider Office Phone # Fax #    Sania Eugenia Jennie Sr, APRN [facility] [phone] [address] [place] [address]        Equal Access to Services     [facility] AH: Hadii aad ku hadasho Soomaali, waaxda luqadaha, qaybta kaalmada adeegyada, waxjessica morales. So [facility] [phone].    ATENCIÓN: Si habla español, tiene a gallagher disposición servicios gratuitos de asistencia lingüística. Llame al [phone].    We comply with applicable federal civil rights laws and [place] laws. We do not discriminate on the basis of race, color, national origin, age, disability, sex, sexual orientation, or gender identity.            Thank you!     Thank you for choosing [facility]  for your care. Our goal is always to provide you with excellent care. Hearing back from our patients is one way we can continue to improve our services. Please take a few minutes to complete the written survey that you may receive " in the mail after your visit with us. Thank you!             Your Updated Medication List - Protect others around you: Learn how to safely use, store and throw away your medicines at www.disposemymeds.org.          This list is accurate as of 9/18/18  5:21 PM.  Always use your most recent med list.                   Brand Name Dispense Instructions for use Diagnosis    losartan 50 MG tablet    COZAAR    90 tablet    Take 1 tablet (50 mg) by mouth daily    Essential hypertension with goal blood pressure less than 140/90       pantoprazole 40 MG EC tablet    PROTONIX    90 tablet    TAKE 1 TABLET (40 MG) BY MOUTH DAILY    Gastroesophageal reflux disease with esophagitis       selenium sulfide 2.5 % lotion    SELSUN    120 mL    Apply  topically daily as needed for itching.    Fungal skin infection       triamcinolone 0.1 % cream    KENALOG    30 g    APPLY TOPICALLY TO AFFECTED AREA(S) 2 TIMES DAILY for 7-10 days    Dermatitis       VALTREX 1000 mg tablet   Generic drug:  valACYclovir     12 tablet    Take 2 tablets (2,000 mg) by mouth 2 times daily Dispense as written    Recurrent cold sores

## 2018-09-18 NOTE — NURSING NOTE
"Initial /84 (BP Location: Right arm, Patient Position: Right side, Cuff Size: Adult Regular)  Pulse 91  Temp 98.5  F (36.9  C) (Oral)  Ht 1.626 m (5' 4\")  Wt 65.3 kg (144 lb)  BMI 24.72 kg/m2 Estimated body mass index is 24.72 kg/(m^2) as calculated from the following:    Height as of this encounter: 1.626 m (5' 4\").    Weight as of this encounter: 65.3 kg (144 lb). .    Crystal Espinoza CMA    "

## 2018-10-15 ENCOUNTER — ALLIED HEALTH/NURSE VISIT (OUTPATIENT)
Dept: FAMILY MEDICINE | Facility: CLINIC | Age: 68
End: 2018-10-15
Payer: COMMERCIAL

## 2018-10-15 DIAGNOSIS — Z23 NEED FOR PROPHYLACTIC VACCINATION AND INOCULATION AGAINST INFLUENZA: Primary | ICD-10-CM

## 2018-10-15 PROCEDURE — 90662 IIV NO PRSV INCREASED AG IM: CPT

## 2018-10-15 PROCEDURE — G0008 ADMIN INFLUENZA VIRUS VAC: HCPCS

## 2018-10-15 PROCEDURE — 99207 ZZC NO CHARGE NURSE ONLY: CPT

## 2018-10-15 NOTE — MR AVS SNAPSHOT
After Visit Summary   10/15/2018    Karen Hernandez    MRN: [id number]           Patient Information     Date Of Birth          1950        Visit Information        Provider Department      10/15/2018 2:45 PM Cma/Lpn, [facility]        Today's Diagnoses     Need for prophylactic vaccination and inoculation against influenza    -  1       Follow-ups after your visit        Your next 10 appointments already scheduled     Oct 15, 2018  2:45 PM CDT   Nurse Only with [facility] Cma/Lpn   [facility] ([facility])    [address] [address]   [phone]            Nov 05, 2018 10:00 AM CST   New Visit with Leona Cage PA-C   [facility]    [address] [address] [phone]              Who to contact     Normal or non-critical lab and imaging results will be communicated to you by [facility], letter or phone within 4 business days after the clinic has received the results. If you do not hear from us within 7 days, please contact the clinic through [facility] or phone. If you have a critical or abnormal lab result, we will notify you by phone as soon as possible.  Submit refill requests through [facility] or call your pharmacy and they will forward the refill request to us. Please allow 3 business days for your refill to be completed.          If you need to speak with a  for additional information , please call: [phone]           Additional Information About Your Visit        CBA PHARMAharSylantro Information     [facility] gives you secure access to your electronic health record. If you see a primary care provider, you can also send messages to your care team and make appointments. If you have questions, please call your primary care clinic.  If you do not have a primary care provider, please call [phone] and they will assist you.        Care EveryWhere ID      This is your Care EveryWhere ID. This could be used by other organizations to access your [place] medical records  [id number]         Blood Pressure from Last 3 Encounters:   09/18/18 138/84   03/13/18 128/78   02/05/18 134/84    Weight from Last 3 Encounters:   09/18/18 144 lb (65.3 kg)   12/12/17 145 lb 11.2 oz (66.1 kg)   10/17/17 146 lb 9.6 oz (66.5 kg)              We Performed the Following     ADMIN INFLUENZA (For MEDICARE Patients ONLY) []     FLU VACCINE, INCREASED ANTIGEN, PRESV FREE, AGE 65+ [51783]        Primary Care Provider Office Phone # Fax #    [facility] [phone] [address] [place] [address]        Equal Access to Services     [facility] : Hadii aad ku hadasho Soomaali, waaxda luqadaha, qaybta kaalmada adeegyada, carlos alberto pattersonin haynabil payton . So [facility] [phone].    ATENCIÓN: Si habla español, tiene a gallagher disposición servicios gratuitos de asistencia lingüística. Hernestoame al [phone].    We comply with applicable federal civil rights laws and [place] laws. We do not discriminate on the basis of race, color, national origin, age, disability, sex, sexual orientation, or gender identity.            Thank you!     Thank you for choosing [facility]  for your care. Our goal is always to provide you with excellent care. Hearing back from our patients is one way we can continue to improve our services. Please take a few minutes to complete the written survey that you may receive in the mail after your visit with us. Thank you!             Your Updated Medication List - Protect others around you: Learn how to safely use, store and throw away your medicines at www.disposemymeds.org.          This list is accurate as of 10/15/18  1:31 PM.  Always use your most recent med list.                   Brand Name Dispense Instructions for use Diagnosis    losartan 50 MG tablet    COZAAR    90 tablet    Take 1 tablet  (50 mg) by mouth daily    Essential hypertension with goal blood pressure less than 140/90       pantoprazole 40 MG EC tablet    PROTONIX    90 tablet    TAKE 1 TABLET (40 MG) BY MOUTH DAILY    Gastroesophageal reflux disease with esophagitis       selenium sulfide 2.5 % lotion    SELSUN    120 mL    Apply  topically daily as needed for itching.    Fungal skin infection       triamcinolone 0.1 % cream    KENALOG    30 g    APPLY TOPICALLY TO AFFECTED AREA(S) 2 TIMES DAILY for 7-10 days    Dermatitis       VALTREX 1000 mg tablet   Generic drug:  valACYclovir     12 tablet    Take 2 tablets (2,000 mg) by mouth 2 times daily Dispense as written    Recurrent cold sores

## 2018-10-15 NOTE — PROGRESS NOTES

## 2018-10-31 ENCOUNTER — NURSE TRIAGE (OUTPATIENT)
Dept: NURSING | Facility: CLINIC | Age: 68
End: 2018-10-31

## 2018-10-31 NOTE — TELEPHONE ENCOUNTER
Karen received call from clinic and was returning call. Provided information to Karen per Epic note. Karen voiced understanding and states that she will make an appointment if needed.     Kalli Rashid RN/FNA      Additional Information    [1] Follow-up call to recent contact AND [2] information only call, no triage required    Protocols used: INFORMATION ONLY CALL-ADULT-

## 2018-12-12 ENCOUNTER — TELEPHONE (OUTPATIENT)
Dept: DERMATOLOGY | Facility: CLINIC | Age: 68
End: 2018-12-12

## 2018-12-12 ENCOUNTER — OFFICE VISIT (OUTPATIENT)
Dept: DERMATOLOGY | Facility: CLINIC | Age: 68
End: 2018-12-12
Payer: COMMERCIAL

## 2018-12-12 VITALS — OXYGEN SATURATION: 97 % | DIASTOLIC BLOOD PRESSURE: 77 MMHG | SYSTOLIC BLOOD PRESSURE: 133 MMHG | HEART RATE: 88 BPM

## 2018-12-12 DIAGNOSIS — L82.1 SK (SEBORRHEIC KERATOSIS): ICD-10-CM

## 2018-12-12 DIAGNOSIS — D18.01 CHERRY ANGIOMA: ICD-10-CM

## 2018-12-12 DIAGNOSIS — D22.9 BENIGN NEVUS: ICD-10-CM

## 2018-12-12 DIAGNOSIS — L81.4 LENTIGO: ICD-10-CM

## 2018-12-12 DIAGNOSIS — L21.9 DERMATITIS, SEBORRHEIC: Primary | ICD-10-CM

## 2018-12-12 DIAGNOSIS — D48.5 NEOPLASM OF UNCERTAIN BEHAVIOR OF SKIN: ICD-10-CM

## 2018-12-12 DIAGNOSIS — L82.0 INFLAMED SEBORRHEIC KERATOSIS: Primary | ICD-10-CM

## 2018-12-12 PROCEDURE — 88331 PATH CONSLTJ SURG 1 BLK 1SPC: CPT | Performed by: DERMATOLOGY

## 2018-12-12 PROCEDURE — 99214 OFFICE O/P EST MOD 30 MIN: CPT | Mod: 25 | Performed by: PHYSICIAN ASSISTANT

## 2018-12-12 PROCEDURE — 11100 HC BIOPSY SKIN/SUBQ/MUC MEM, SINGLE LESION: CPT | Performed by: PHYSICIAN ASSISTANT

## 2018-12-12 RX ORDER — TRIAMCINOLONE ACETONIDE 1 MG/ML
LOTION TOPICAL
Qty: 60 ML | Refills: 3 | Status: SHIPPED | OUTPATIENT
Start: 2018-12-12 | End: 2019-12-02

## 2018-12-12 RX ORDER — FLUOCINOLONE ACETONIDE 0.1 MG/ML
SOLUTION TOPICAL
Qty: 60 ML | Refills: 4 | Status: SHIPPED | OUTPATIENT
Start: 2018-12-12 | End: 2018-12-17

## 2018-12-12 RX ORDER — KETOCONAZOLE 20 MG/ML
SHAMPOO TOPICAL
Qty: 120 ML | Refills: 11 | Status: SHIPPED | OUTPATIENT
Start: 2018-12-12 | End: 2020-09-16

## 2018-12-12 NOTE — NURSING NOTE
Chief Complaint   Patient presents with     Skin Check       Vitals:    12/12/18 0952   BP: 133/77   Pulse: 88   SpO2: 97%     Wt Readings from Last 1 Encounters:   09/18/18 65.3 kg (144 lb)     Lakeshia Diane LPN.................12/12/2018

## 2018-12-12 NOTE — TELEPHONE ENCOUNTER
----- Message from David Keene MD sent at 12/12/2018 10:45 AM CST -----  Mid upper chest inflamed seborrheic keratosis no treatment

## 2018-12-12 NOTE — PROGRESS NOTES
Karen Hernandez is a 68 year old year old female patient here today for skin check. She reports that she has had many years of sun exposure life guarding as a teenager. Patient notes a spot on her chest that it redder present for a few years but now seems to be resolving. She denies any bleeding. She also notes bumps on her eyelids and rash on her scalp. She reports selsun sulfide does help. Associated symptoms: none.  Patient has no other skin complaints today.  Remainder of the HPI, Meds, PMH, Allergies, FH, and SH was reviewed in chart.    Past Medical History:   Diagnosis Date     AK (actinic keratosis) 4/14/2011     FHX COLON CANCER - SCOPE Q 5Y 5/17/2007 2007 negative.      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  Recheck 3-6mo.       Moles 4/14/2011     NO ACTIVE PROBLEMS        Past Surgical History:   Procedure Laterality Date     C APPENDECTOMY  1960     COLONOSCOPY  8/10/2012    Procedure: COLONOSCOPY;  Colonoscopy;  Surgeon: Carlos Alatorre MD;  Location: [facility]     COLONOSCOPY N/A 1/4/2017    Procedure: COLONOSCOPY;  Surgeon: Stepan Samayoa MD;  Location: [facility]     EYE SURGERY  2017     HYSTERECTOMY, PAP NO LONGER INDICATED  2000    Ovaries left     TONSILLECTOMY  1958        Family History   Problem Relation Age of Onset     Cancer - colorectal Father      Cancer Daughter      Breast Cancer Daughter      Congenital Anomalies Daughter         trisomy 18     Diabetes No family hx of      Hypertension No family hx of      Cerebrovascular Disease No family hx of      Prostate Cancer No family hx of        Social History     Socioeconomic History     Marital status:      Spouse name: Marko Hernandez     Number of children: 3     Years of education: Not on file     Highest education level: Not on file   Social Needs     Financial resource strain:  Not on file     Food insecurity - worry: Not on file     Food insecurity - inability: Not on file     Transportation needs - medical: Not on file     Transportation needs - non-medical: Not on file   Occupational History     Occupation:      Employer: [employer]   Tobacco Use     Smoking status: Never Smoker     Smokeless tobacco: Never Used   Substance and Sexual Activity     Alcohol use: Yes     Comment: Social-- 6/wk     Drug use: No     Sexual activity: Yes     Partners: Male     Birth control/protection: Surgical   Other Topics Concern     Parent/sibling w/ CABG, MI or angioplasty before 65F 55M? No   Social History Narrative     Not on file       Outpatient Encounter Medications as of 12/12/2018   Medication Sig Dispense Refill     fluocinolone (SYNALAR) 0.01 % solution Apply sparingly twice daily as needed to rash on scalp, face 60 mL 4     ketoconazole (NIZORAL) 2 % external shampoo Apply to scalp, leave on for 5 minutes, rinse. Use 2-3 times weekly. 120 mL 11     losartan (COZAAR) 50 MG tablet Take 1 tablet (50 mg) by mouth daily 90 tablet 3     pantoprazole (PROTONIX) 40 MG EC tablet TAKE 1 TABLET (40 MG) BY MOUTH DAILY 90 tablet 3     selenium sulfide (SELSUN) 2.5 % shampoo Apply  topically daily as needed for itching. 120 mL 0     triamcinolone (KENALOG) 0.1 % cream APPLY TOPICALLY TO AFFECTED AREA(S) 2 TIMES DAILY for 7-10 days 30 g 1     VALTREX 1 G tablet Take 2 tablets (2,000 mg) by mouth 2 times daily Dispense as written 12 tablet 1     No facility-administered encounter medications on file as of 12/12/2018.              Review Of Systems  Skin: As above  Eyes: negative  Ears/Nose/Throat: negative  Respiratory: No shortness of breath, dyspnea on exertion, cough, or hemoptysis  Cardiovascular: negative  Gastrointestinal: negative  Genitourinary: negative  Musculoskeletal: negative  Neurologic: negative  Psychiatric: negative  Hematologic/Lymphatic/Immunologic:  negative  Endocrine: negative      O:   NAD, WDWN, Alert & Oriented, Mood & Affect wnl, Vitals stable   Here today alone   /77   Pulse 88   SpO2 97%    General appearance normal   Vitals stable   Alert, oriented and in no acute distress     0.6 cm pink pearly macule on mid upper chest   Mild pink scaly thin plaques around scalp, glabella consistent with seborrheic dermatitis    Stuck on papules and brown macules on trunk and ext   Red papules on trunk  Brown papules and macules with regular pigment network and borders on torso and extremities  Skin colored papules on eyelids      The remainder of the detailed exam was unremarkable; the following areas were examined:  scalp/hair, conjunctiva/lids, face, neck, lips/teeth, oral mucosa/gingiva, chest, back, abdomen, buttocks, digits/nails, RUE, LUE, RLE, LLE.      Eyes: Conjunctivae/lids:Normal     ENT: Lips: normal    MSK:Normal    Cardiovascular: peripheral edema none    Pulm: Breathing Normal    Neuro/Psych: Orientation:Normal; Mood/Affect:Normal  A/P:  1. R/O BCC vs healing macule on mid upper chest  TANGENTIAL BIOPSY IN HOUSE:  After consent, anesthesia with LEC and prep, tangential excision performed.  No complications and routine wound care.    2. Seborrheic eczema  Discussed chronic nature of rash.   Seborrheic Dermatitis can cause an itchy scaly scalp and rash on face and neck. It is  caused by a reaction to yeast, that normally inhabits our skin.  To treat your seborrheic dermatitis I prescribed:   *Ketoconazole shampoo: Wash 2-3 times a week. When washing hair keep on scalp for 5 minutes and rinse  *Fluocinolone solution: Apply daily to twice daily to itchy scalp as needed.   3. Seborrheic keratosis, lentigo, angioma, dermal nevus, syringomas on eyelids   BENIGN LESIONS DISCUSSED WITH PATIENT:  I discussed the specifics of tumor, prognosis, and genetics of benign lesions.  I explained that treatment of these lesions would be purely cosmetic and not  medically neccessary.  I discussed with patient different removal options including excision, cautery and /or laser.      Nature and genetics of benign skin lesions dicussed with patient.  Signs and Symptoms of skin cancer discussed with patient.  ABCDEs of melanoma reviewed with patient.  Patient encouraged to perform monthly skin exams.  UV precautions reviewed with patient.  Risks of non-melanoma skin cancer discussed with patient   Return to clinic one year or sooner if needed.

## 2018-12-12 NOTE — LETTER
12/12/2018         RE: Karen Hernandez  [address] [place] [id number]        Dear Colleague,    Thank you for referring your patient, Karen Hernandez, to the [facility]. Please see a copy of my visit note below.    Karen Hernandez is a 68 year old year old female patient here today for skin check. She reports that she has had many years of sun exposure life guarding as a teenager. Patient notes a spot on her chest that it redder present for a few years but now seems to be resolving. She denies any bleeding. She also notes bumps on her eyelids and rash on her scalp. She reports selsun sulfide does help. Associated symptoms: none.  Patient has no other skin complaints today.  Remainder of the HPI, Meds, PMH, Allergies, FH, and SH was reviewed in chart.    Past Medical History:   Diagnosis Date     AK (actinic keratosis) 4/14/2011     FHX COLON CANCER - SCOPE Q 5Y 5/17/2007 2007 negative.      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  Recheck 3-6mo.       Moles 4/14/2011     NO ACTIVE PROBLEMS        Past Surgical History:   Procedure Laterality Date     C APPENDECTOMY  1960     COLONOSCOPY  8/10/2012    Procedure: COLONOSCOPY;  Colonoscopy;  Surgeon: Carlos Alatorre MD;  Location: [facility]     COLONOSCOPY N/A 1/4/2017    Procedure: COLONOSCOPY;  Surgeon: Stepan Samayoa MD;  Location: [facility]     EYE SURGERY  2017     HYSTERECTOMY, PAP NO LONGER INDICATED  2000    Ovaries left     TONSILLECTOMY  1958        Family History   Problem Relation Age of Onset     Cancer - colorectal Father      Cancer Daughter      Breast Cancer Daughter      Congenital Anomalies Daughter         trisomy 18     Diabetes No family hx of      Hypertension No family hx of      Cerebrovascular Disease No family hx of      Prostate Cancer No family hx of        Social  History     Socioeconomic History     Marital status:      Spouse name: Marko Hernandez     Number of children: 3     Years of education: Not on file     Highest education level: Not on file   Social Needs     Financial resource strain: Not on file     Food insecurity - worry: Not on file     Food insecurity - inability: Not on file     Transportation needs - medical: Not on file     Transportation needs - non-medical: Not on file   Occupational History     Occupation:      Employer: [employer]   Tobacco Use     Smoking status: Never Smoker     Smokeless tobacco: Never Used   Substance and Sexual Activity     Alcohol use: Yes     Comment: Social-- 6/wk     Drug use: No     Sexual activity: Yes     Partners: Male     Birth control/protection: Surgical   Other Topics Concern     Parent/sibling w/ CABG, MI or angioplasty before 65F 55M? No   Social History Narrative     Not on file       Outpatient Encounter Medications as of 12/12/2018   Medication Sig Dispense Refill     fluocinolone (SYNALAR) 0.01 % solution Apply sparingly twice daily as needed to rash on scalp, face 60 mL 4     ketoconazole (NIZORAL) 2 % external shampoo Apply to scalp, leave on for 5 minutes, rinse. Use 2-3 times weekly. 120 mL 11     losartan (COZAAR) 50 MG tablet Take 1 tablet (50 mg) by mouth daily 90 tablet 3     pantoprazole (PROTONIX) 40 MG EC tablet TAKE 1 TABLET (40 MG) BY MOUTH DAILY 90 tablet 3     selenium sulfide (SELSUN) 2.5 % shampoo Apply  topically daily as needed for itching. 120 mL 0     triamcinolone (KENALOG) 0.1 % cream APPLY TOPICALLY TO AFFECTED AREA(S) 2 TIMES DAILY for 7-10 days 30 g 1     VALTREX 1 G tablet Take 2 tablets (2,000 mg) by mouth 2 times daily Dispense as written 12 tablet 1     No facility-administered encounter medications on file as of 12/12/2018.              Review Of Systems  Skin: As above  Eyes: negative  Ears/Nose/Throat: negative  Respiratory: No shortness of breath,  dyspnea on exertion, cough, or hemoptysis  Cardiovascular: negative  Gastrointestinal: negative  Genitourinary: negative  Musculoskeletal: negative  Neurologic: negative  Psychiatric: negative  Hematologic/Lymphatic/Immunologic: negative  Endocrine: negative      O:   NAD, WDWN, Alert & Oriented, Mood & Affect wnl, Vitals stable   Here today alone   /77   Pulse 88   SpO2 97%    General appearance normal   Vitals stable   Alert, oriented and in no acute distress     0.6 cm pink pearly macule on mid upper chest   Mild pink scaly thin plaques around scalp, glabella consistent with seborrheic dermatitis    Stuck on papules and brown macules on trunk and ext   Red papules on trunk  Brown papules and macules with regular pigment network and borders on torso and extremities  Skin colored papules on eyelids      The remainder of the detailed exam was unremarkable; the following areas were examined:  scalp/hair, conjunctiva/lids, face, neck, lips/teeth, oral mucosa/gingiva, chest, back, abdomen, buttocks, digits/nails, RUE, LUE, RLE, LLE.      Eyes: Conjunctivae/lids:Normal     ENT: Lips: normal    MSK:Normal    Cardiovascular: peripheral edema none    Pulm: Breathing Normal    Neuro/Psych: Orientation:Normal; Mood/Affect:Normal  A/P:  1. R/O BCC vs healing macule on mid upper chest  TANGENTIAL BIOPSY IN HOUSE:  After consent, anesthesia with LEC and prep, tangential excision performed.  No complications and routine wound care.    2. Seborrheic eczema  Discussed chronic nature of rash.   Seborrheic Dermatitis can cause an itchy scaly scalp and rash on face and neck. It is  caused by a reaction to yeast, that normally inhabits our skin.  To treat your seborrheic dermatitis I prescribed:   *Ketoconazole shampoo: Wash 2-3 times a week. When washing hair keep on scalp for 5 minutes and rinse  *Fluocinolone solution: Apply daily to twice daily to itchy scalp as needed.   3. Seborrheic keratosis, lentigo, angioma, dermal  nevus, syringomas on eyelids   BENIGN LESIONS DISCUSSED WITH PATIENT:  I discussed the specifics of tumor, prognosis, and genetics of benign lesions.  I explained that treatment of these lesions would be purely cosmetic and not medically neccessary.  I discussed with patient different removal options including excision, cautery and /or laser.      Nature and genetics of benign skin lesions dicussed with patient.  Signs and Symptoms of skin cancer discussed with patient.  ABCDEs of melanoma reviewed with patient.  Patient encouraged to perform monthly skin exams.  UV precautions reviewed with patient.  Risks of non-melanoma skin cancer discussed with patient   Return to clinic one year or sooner if needed.       Again, thank you for allowing me to participate in the care of your patient.        Sincerely,        Hannah Powers PA-C

## 2018-12-12 NOTE — PROGRESS NOTES
Mid upper chest:Sharply demarcated exophytic epidermal growth composed of sheets of small cells basaloid cells with variable melanin pigmentation.  There are keratin-filled cysts throughout.  The dermis is overall unremarkable with a bland monomorphic inflammatory infiltrate.        Mid upper chest inflamed seborrheic keratosis no treatment

## 2018-12-12 NOTE — LETTER
12/12/2018         RE: Karen Hernandez  [address] [place] [id number]        Dear Colleague,    Thank you for referring your patient, Karen Hernandez, to the [facility]. Please see a copy of my visit note below.    Mid upper chest:Sharply demarcated exophytic epidermal growth composed of sheets of small cells basaloid cells with variable melanin pigmentation.  There are keratin-filled cysts throughout.  The dermis is overall unremarkable with a bland monomorphic inflammatory infiltrate.        Mid upper chest inflamed seborrheic keratosis no treatment    Again, thank you for allowing me to participate in the care of your patient.        Sincerely,        David Keene MD

## 2018-12-12 NOTE — PATIENT INSTRUCTIONS
Seborrheic Dermatitis can cause an itchy scaly scalp and rash on face and neck. It is    caused by a reaction to yeast, that normally inhabits our skin.    To treat your seborrheic dermatitis I prescribed:     *Ketoconazole shampoo: Wash 2-3 times a week. When washing hair keep on scalp for    5 minutes and rinse    *Fluocinolone solution: Apply daily to twice daily to itchy scalp as needed.     Wound Care Instructions     FOR SUPERFICIAL WOUNDS     AFTER 24 HOURS YOU SHOULD REMOVE THE BANDAGE AND BEGIN DAILY DRESSING CHANGES AS FOLLOWS:     1) Remove Dressing.     2) Clean and dry the area with tap water using a Q-tip or sterile gauze pad.     3) Apply Polysporin ointment or Bacitracin ointment over entire wound.  Do NOT use Neosporin ointment.     4) Cover the wound with a band-aid, or a sterile non-stick gauze pad and micropore paper tape      REPEAT THESE INSTRUCTIONS AT LEAST ONCE A DAY UNTIL THE WOUND HAS COMPLETELY HEALED.    It is an old wives tale that a wound heals better when it is exposed to air and allowed to dry out. The wound will heal faster with a better cosmetic result if it is kept moist with ointment and covered with a bandage.    **Do not let the wound dry out.**      Supplies Needed:      *Cotton tipped applicators (Q-tips)    *Polysporin Ointment or Bacitracin Ointment (NOT NEOSPORIN)    *Band-aids or non-stick gauze pads and micropore paper tape.    PATIENT INFORMATION:    During the healing process you will notice a number of changes. All wounds develop a small halo of redness surrounding the wound.  This means healing is occurring. Severe itching with extensive redness usually indicates sensitivity to the ointment or bandage tape used to dress the wound.  You should call our office if this develops.      Swelling  and/or discoloration around your surgical site is common, particularly when performed around the eye.    All wounds normally drain.  The larger the wound the more drainage there  will be.  After 7-10 days, you will notice the wound beginning to shrink and new skin will begin to grow.  The wound is healed when you can see skin has formed over the entire area.  A healed wound has a healthy, shiny look to the surface and is red to dark pink in color to normalize.  Wounds may take approximately 4-6 weeks to heal.  Larger wounds may take 6-8 weeks.  After the wound is healed you may discontinue dressing changes.    You may experience a sensation of tightness as your wound heals. This is normal and will gradually subside.    Your healed wound may be sensitive to temperature changes. This sensitivity improves with time, but if you re having a lot of discomfort, try to avoid temperature extremes.    Patients frequently experience itching after their wound appears to have healed because of the continue healing under the skin.  Plain Vaseline will help relieve the itching.    BLEEDIN. Leave the bandage in place.  2. Use tightly rolled up gauze or a cloth to apply direct pressure over the bandage for 20 minutes.  3. Reapply pressure for an additional 20 minutes if necessary  4. Call the office or go to the nearest emergency room if pressure fails to stop the bleeding.  5. Use additional gauze and tape to maintain pressure once the bleeding has stopped.  6. If pressure alone does not stop the bleeding, call the Dermatology Clinic at [phone] or go to the nearest Emergency room.      *

## 2018-12-12 NOTE — TELEPHONE ENCOUNTER
pt is requesting a less expensive med , please call in to  [facility]  [facility] Atkinson, med is Flucinolone 0.01%

## 2018-12-17 ENCOUNTER — OFFICE VISIT (OUTPATIENT)
Dept: FAMILY MEDICINE | Facility: CLINIC | Age: 68
End: 2018-12-17
Payer: COMMERCIAL

## 2018-12-17 VITALS
DIASTOLIC BLOOD PRESSURE: 80 MMHG | WEIGHT: 147.4 LBS | TEMPERATURE: 98.4 F | SYSTOLIC BLOOD PRESSURE: 144 MMHG | BODY MASS INDEX: 25.3 KG/M2 | OXYGEN SATURATION: 98 % | HEART RATE: 71 BPM

## 2018-12-17 DIAGNOSIS — H92.02 LEFT EAR PAIN: Primary | ICD-10-CM

## 2018-12-17 PROCEDURE — 99212 OFFICE O/P EST SF 10 MIN: CPT | Performed by: FAMILY MEDICINE

## 2018-12-17 ASSESSMENT — ENCOUNTER SYMPTOMS
AGITATION: 0
HEADACHES: 0
VOMITING: 0
DIZZINESS: 0
ACTIVITY CHANGE: 0
WHEEZING: 0
NAUSEA: 0
NERVOUS/ANXIOUS: 0
SLEEP DISTURBANCE: 0
WEAKNESS: 0
CHILLS: 0
SHORTNESS OF BREATH: 0
COLOR CHANGE: 0
FATIGUE: 0
PALPITATIONS: 0
COUGH: 0
APPETITE CHANGE: 0

## 2018-12-17 NOTE — PROGRESS NOTES
SUBJECTIVE:   Karen Hernandez is a 68 year old female who presents to clinic today for the following health issues:      ENT Symptoms             Symptoms: cc Present Absent Comment   Fever/Chills   x    Fatigue   x    Muscle Aches   x    Eye Irritation   x    Sneezing   x    Nasal Franc/Drg   x    Sinus Pressure/Pain   x    Loss of smell   x    Dental pain   x    Sore Throat   x    Swollen Glands   x    Ear Pain/Fullness x   Left ear pain x1 day   Cough   x    Wheeze   x    Chest Pain   x    Shortness of breath   x    Rash   x    Other         Symptom duration:  1 day   Symptom severity:  moderate   Treatments tried:  none   Contacts:  none     Patient Active Problem List   Diagnosis     FHX COLON CANCER - SCOPE Q 5Y     Hyperlipidemia LDL goal <130     AK (actinic keratosis)     Melanocytic nevus     Recurrent cold sores     Herpes simplex of eye     24 hour contact handout given     Loss of height     Dermatitis     Gastroesophageal reflux disease with esophagitis     Essential hypertension with goal blood pressure less than 140/90     Osteopenia     Elevated glucose     Keratosis, seborrheic     Advanced directives, counseling/discussion     Past Surgical History:   Procedure Laterality Date     C APPENDECTOMY  1960     COLONOSCOPY  8/10/2012    Procedure: COLONOSCOPY;  Colonoscopy;  Surgeon: Carlos Alatorre MD;  Location: [facility]     COLONOSCOPY N/A 1/4/2017    Procedure: COLONOSCOPY;  Surgeon: Stepan Samayoa MD;  Location: [facility]     EYE SURGERY  2017     HYSTERECTOMY, PAP NO LONGER INDICATED  2000    Ovaries left     TONSILLECTOMY  1958       Social History     Tobacco Use     Smoking status: Never Smoker     Smokeless tobacco: Never Used   Substance Use Topics     Alcohol use: Yes     Comment: Social-- 6/wk     Family History   Problem Relation Age of Onset     Cancer - colorectal Father      Cancer Daughter      Breast Cancer Daughter      Congenital Anomalies Daughter         trisomy 18      Diabetes No family hx of      Hypertension No family hx of      Cerebrovascular Disease No family hx of      Prostate Cancer No family hx of          Current Outpatient Medications   Medication Sig Dispense Refill     ketoconazole (NIZORAL) 2 % external shampoo Apply to scalp, leave on for 5 minutes, rinse. Use 2-3 times weekly. 120 mL 11     losartan (COZAAR) 50 MG tablet Take 1 tablet (50 mg) by mouth daily 90 tablet 3     pantoprazole (PROTONIX) 40 MG EC tablet TAKE 1 TABLET (40 MG) BY MOUTH DAILY 90 tablet 3     selenium sulfide (SELSUN) 2.5 % shampoo Apply  topically daily as needed for itching. 120 mL 0     triamcinolone (KENALOG) 0.1 % cream APPLY TOPICALLY TO AFFECTED AREA(S) 2 TIMES DAILY for 7-10 days 30 g 1     triamcinolone (KENALOG) 0.1 % external lotion Apply twice daily as needed to rash on scalp, forehead, use sparingly. 60 mL 3     VALTREX 1 G tablet Take 2 tablets (2,000 mg) by mouth 2 times daily Dispense as written 12 tablet 1     Allergies   Allergen Reactions     Nkda [No Known Drug Allergies]        Reviewed and updated as needed this visit by clinical staff  Tobacco  Allergies  Meds  Problems  Med Hx  Surg Hx  Fam Hx  Soc Hx        Reviewed and updated as needed this visit by Provider  Tobacco  Allergies  Meds  Problems  Med Hx  Surg Hx  Fam Hx       1. Left ear pain: Started this morning and has been getting worse.  No discharge.  No ringing of ears.  Mild dizziness.  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  No fevers or chills.  Patient uses qtip every other day.  No recent swimming exposure.  No recent flights.  States of improvement with ibuprofen.     ROS:  Review of Systems   Constitutional: Negative for activity change, appetite change, chills and fatigue.   HENT: Positive for ear pain (left).    Respiratory: Negative for cough, shortness of breath and wheezing.    Cardiovascular: Negative for chest pain and palpitations.    Gastrointestinal: Negative for nausea and vomiting.   Skin: Negative for color change and rash.   Neurological: Negative for dizziness, weakness and headaches.   Psychiatric/Behavioral: Negative for agitation and sleep disturbance. The patient is not nervous/anxious.        OBJECTIVE:     /80 (BP Location: Left arm, Cuff Size: Adult Regular)   Pulse 71   Temp 98.4  F (36.9  C) (Tympanic)   Wt 66.9 kg (147 lb 6.4 oz)   SpO2 98%   BMI 25.30 kg/m    Body mass index is 25.3 kg/m .  Physical Exam   Constitutional: She is oriented to person, place, and time. She appears well-developed and well-nourished. No distress.   HENT:   Head: Normocephalic and atraumatic.   Right Ear: External ear normal.   Left Ear: External ear normal.   Mouth/Throat: Oropharynx is clear and moist.   Oropharynx: no dental abscess appreciated, No sinus tenderness   Neck: Normal range of motion.   Cardiovascular: Normal rate, regular rhythm and normal heart sounds.   Pulmonary/Chest: Effort normal and breath sounds normal. She has no wheezes. She has no rales.   Neurological: She is alert and oriented to person, place, and time.   Psychiatric: She has a normal mood and affect. Her behavior is normal.     ASSESSMENT/PLAN:     1. Left ear pain  Most viral  Supportive care for now.  Hold off antibiotics at this time.     See Patient Instructions    Chapo Haynes,   [facility]

## 2018-12-17 NOTE — PATIENT INSTRUCTIONS
Olga Hernandez,    Thank you for allowing [facility] to manage your care.    May take ibuprofen or advil as needed for ear pain.     If you have any questions or concerns, please feel free to call us at [phone].    Sincerely,    Dr. Haynes

## 2019-01-29 ENCOUNTER — OFFICE VISIT (OUTPATIENT)
Dept: FAMILY MEDICINE | Facility: CLINIC | Age: 69
End: 2019-01-29
Payer: COMMERCIAL

## 2019-01-29 VITALS
WEIGHT: 147 LBS | OXYGEN SATURATION: 98 % | SYSTOLIC BLOOD PRESSURE: 126 MMHG | TEMPERATURE: 98 F | HEIGHT: 64 IN | DIASTOLIC BLOOD PRESSURE: 80 MMHG | BODY MASS INDEX: 25.1 KG/M2 | HEART RATE: 96 BPM

## 2019-01-29 DIAGNOSIS — H04.123 DRY EYES: Primary | ICD-10-CM

## 2019-01-29 PROCEDURE — 99213 OFFICE O/P EST LOW 20 MIN: CPT | Performed by: FAMILY MEDICINE

## 2019-01-29 ASSESSMENT — ENCOUNTER SYMPTOMS
CHILLS: 0
PALPITATIONS: 0
COLOR CHANGE: 0
ACTIVITY CHANGE: 0
HEADACHES: 0
DIZZINESS: 0
AGITATION: 0
SLEEP DISTURBANCE: 0
SHORTNESS OF BREATH: 0
FATIGUE: 0
NERVOUS/ANXIOUS: 0
APPETITE CHANGE: 0
COUGH: 0
WHEEZING: 0
WEAKNESS: 0

## 2019-01-29 ASSESSMENT — MIFFLIN-ST. JEOR: SCORE: 1181.79

## 2019-01-29 NOTE — PATIENT INSTRUCTIONS
Cassandras Karen Hernandez,    Thank you for allowing [facility] to manage your care.    I sent your prescriptions to your pharmacy.    Encourage warm compress to your right eye twice a day.     If you have any questions or concerns, please feel free to call us at [phone].    Sincerely,    Dr. Haynes

## 2019-01-29 NOTE — PROGRESS NOTES
147#   SUBJECTIVE:   Karen Hernandez is a 68 year old female who presents to clinic today for the following health issues:      Patient Active Problem List   Diagnosis     FHX COLON CANCER - SCOPE Q 5Y     Hyperlipidemia LDL goal <130     AK (actinic keratosis)     Melanocytic nevus     Recurrent cold sores     Herpes simplex of eye     24 hour contact handout given     Loss of height     Dermatitis     Gastroesophageal reflux disease with esophagitis     Essential hypertension with goal blood pressure less than 140/90     Osteopenia     Elevated glucose     Keratosis, seborrheic     Advanced directives, counseling/discussion     Past Surgical History:   Procedure Laterality Date     C APPENDECTOMY  1960     COLONOSCOPY  8/10/2012    Procedure: COLONOSCOPY;  Colonoscopy;  Surgeon: Carlos Alatorre MD;  Location: [facility]     COLONOSCOPY N/A 1/4/2017    Procedure: COLONOSCOPY;  Surgeon: Stepan Samayoa MD;  Location: [facility]     EYE SURGERY  2017     HYSTERECTOMY, PAP NO LONGER INDICATED  2000    Ovaries left     TONSILLECTOMY  1958       Social History     Tobacco Use     Smoking status: Never Smoker     Smokeless tobacco: Never Used   Substance Use Topics     Alcohol use: Yes     Comment: Social-- 6/wk     Family History   Problem Relation Age of Onset     Cancer - colorectal Father      Cancer Daughter      Breast Cancer Daughter      Congenital Anomalies Daughter         trisomy 18     Diabetes No family hx of      Hypertension No family hx of      Cerebrovascular Disease No family hx of      Prostate Cancer No family hx of          Current Outpatient Medications   Medication Sig Dispense Refill     ketoconazole (NIZORAL) 2 % external shampoo Apply to scalp, leave on for 5 minutes, rinse. Use 2-3 times weekly. 120 mL 11     losartan (COZAAR) 50 MG tablet Take 1 tablet (50 mg) by mouth daily 90 tablet 3     pantoprazole (PROTONIX) 40 MG EC tablet TAKE 1 TABLET (40 MG) BY MOUTH DAILY 90 tablet 3      "selenium sulfide (SELSUN) 2.5 % shampoo Apply  topically daily as needed for itching. 120 mL 0     triamcinolone (KENALOG) 0.1 % cream APPLY TOPICALLY TO AFFECTED AREA(S) 2 TIMES DAILY for 7-10 days 30 g 1     triamcinolone (KENALOG) 0.1 % external lotion Apply twice daily as needed to rash on scalp, forehead, use sparingly. 60 mL 3     VALTREX 1 G tablet Take 2 tablets (2,000 mg) by mouth 2 times daily Dispense as written 12 tablet 1     Allergies   Allergen Reactions     Nkda [No Known Drug Allergies]        Reviewed and updated as needed this visit by clinical staff       Reviewed and updated as needed this visit by Provider       1. Right eye soreness: States of improvement.  History of conjunctivitis.  Ongoing for the past 2 days.  Pulsing and hot sensation.  Tried warm compress some relief.  Mild vision changes.  No photophobia.  No double vision.  No sick contacts.  States of mild itchiness.     Review of Systems   Constitutional: Negative for activity change, appetite change, chills and fatigue.   HENT: Positive for ear pain (mild right eye soreness).    Respiratory: Negative for cough, shortness of breath and wheezing.    Cardiovascular: Negative for chest pain and palpitations.   Skin: Negative for color change and rash.   Neurological: Negative for dizziness, weakness and headaches.   Psychiatric/Behavioral: Negative for agitation and sleep disturbance. The patient is not nervous/anxious.          OBJECTIVE:     /80   Pulse 96   Temp 98  F (36.7  C) (Tympanic)   Ht 1.626 m (5' 4\")   Wt 66.7 kg (147 lb)   SpO2 98%   BMI 25.23 kg/m    Body mass index is 25.23 kg/m .  Physical Exam   Constitutional: She is oriented to person, place, and time. She appears well-developed and well-nourished. No distress.   HENT:   Head: Normocephalic and atraumatic.   Nose: Nose normal.   Eyes: Conjunctivae and EOM are normal. Pupils are equal, round, and reactive to light. Right eye exhibits no discharge. Left eye " exhibits no discharge. No scleral icterus.   No periorbital tenderness   Neck: Normal range of motion. No tracheal deviation present.   Cardiovascular: Normal rate, regular rhythm and normal heart sounds.   Pulmonary/Chest: Effort normal and breath sounds normal. No respiratory distress.   Musculoskeletal: Normal range of motion.   Neurological: She is alert and oriented to person, place, and time.   Skin: Skin is warm.   Psychiatric: She has a normal mood and affect. Her behavior is normal.     ASSESSMENT/PLAN:     1. Dry eyes  Of right eye.  No indication for antibiotics.  Encourage warm compress.   - carboxymethylcellulose (CELLUVISC/REFRESH LIQUIGEL) 1 % ophthalmic solution; Place 1 drop into the right eye 2 times daily  Dispense: 15 mL; Refill: 0    See Patient Instructions    Chapo Haynes DO  [facility] [address] [facility]

## 2019-03-16 DIAGNOSIS — I10 ESSENTIAL HYPERTENSION WITH GOAL BLOOD PRESSURE LESS THAN 140/90: ICD-10-CM

## 2019-03-18 NOTE — TELEPHONE ENCOUNTER
"Requested Prescriptions   Pending Prescriptions Disp Refills     losartan (COZAAR) 50 MG tablet [Pharmacy Med Name: LOSARTAN POTASSIUM 50 MG TAB] 90 tablet 3     Sig: TAKE 1 TABLET (50 MG) BY MOUTH DAILY    Angiotensin-II Receptors Failed - 3/16/2019  8:37 AM       Failed - Normal serum creatinine on file in past 12 months    Recent Labs   Lab Test 07/26/16  0858   CR 0.77            Failed - Normal serum potassium on file in past 12 months    Recent Labs   Lab Test 07/26/16  0858   POTASSIUM 4.1                   Passed - Blood pressure under 140/90 in past 12 months    BP Readings from Last 3 Encounters:   01/29/19 126/80   12/17/18 144/80   12/12/18 133/77                Passed - Recent (12 mo) or future (30 days) visit within the authorizing provider's specialty    Patient had office visit in the last 12 months or has a visit in the next 30 days with authorizing provider or within the authorizing provider's specialty.  See \"Patient Info\" tab in inbasket, or \"Choose Columns\" in Meds & Orders section of the refill encounter.             Passed - Medication is active on med list       Passed - Patient is age 18 or older       Passed - No active pregnancy on record       Passed - No positive pregnancy test in past 12 months        Last Written Prescription Date:  3/13/18  Last Fill Quantity: 90,  # refills: 3   Last office visit: 1/29/2019 with prescribing provider:  Sania Sr     Future Office Visit:      "

## 2019-03-18 NOTE — TELEPHONE ENCOUNTER
Routing refill request to provider for review/approval because:  Labs not current:  Cr & K+    Yovana CHOW RN

## 2019-03-19 RX ORDER — LOSARTAN POTASSIUM 50 MG/1
50 TABLET ORAL DAILY
Qty: 90 TABLET | Refills: 0 | Status: SHIPPED | OUTPATIENT
Start: 2019-03-19 | End: 2019-06-10

## 2019-06-10 DIAGNOSIS — I10 ESSENTIAL HYPERTENSION WITH GOAL BLOOD PRESSURE LESS THAN 140/90: ICD-10-CM

## 2019-06-10 RX ORDER — LOSARTAN POTASSIUM 50 MG/1
TABLET ORAL
Qty: 30 TABLET | Refills: 0 | Status: SHIPPED | OUTPATIENT
Start: 2019-06-10 | End: 2019-07-15

## 2019-06-10 NOTE — TELEPHONE ENCOUNTER
"Routing refill request to provider for review/approval because:  Dottie given x1 and patient did not follow up, please advise  Labs not current:  See below  Last OV with AMRITA Sr, CNP was 2/5/18    Requested Prescriptions   Pending Prescriptions Disp Refills     losartan (COZAAR) 50 MG tablet [Pharmacy Med Name: LOSARTAN POTASSIUM 50 MG TAB] 90 tablet 0     Sig: TAKE 1 TAB BY MOUTH DAILY OVER DUE FOR LABS PLEASE CALL FOR LAB ONLY APPOINTMENT AND COME IN FASTING       Angiotensin-II Receptors Failed - 6/10/2019 10:34 AM        Failed - Normal serum creatinine on file in past 12 months     Recent Labs   Lab Test 07/26/16  0858   CR 0.77             Failed - Normal serum potassium on file in past 12 months     Recent Labs   Lab Test 07/26/16  0858   POTASSIUM 4.1                    Passed - Blood pressure under 140/90 in past 12 months     BP Readings from Last 3 Encounters:   01/29/19 126/80   12/17/18 144/80   12/12/18 133/77                 Passed - Recent (12 mo) or future (30 days) visit within the authorizing provider's specialty     Patient had office visit in the last 12 months or has a visit in the next 30 days with authorizing provider or within the authorizing provider's specialty.  See \"Patient Info\" tab in inbasket, or \"Choose Columns\" in Meds & Orders section of the refill encounter.              Passed - Medication is active on med list        Passed - Patient is age 18 or older        Passed - No active pregnancy on record        Passed - No positive pregnancy test in past 12 months        Last Written Prescription Date:  3/19/19  Last Fill Quantity: 90,  # refills: 0   Last office visit: 1/29/2019 with Dr. Haynes  Future Office Visit:        "

## 2019-06-10 NOTE — TELEPHONE ENCOUNTER
I did approve one more month but please call her to let her know that she does need updated labs.

## 2019-06-17 DIAGNOSIS — I10 ESSENTIAL HYPERTENSION WITH GOAL BLOOD PRESSURE LESS THAN 140/90: ICD-10-CM

## 2019-06-17 LAB
ALBUMIN SERPL-MCNC: 4 G/DL (ref 3.4–5)
ALP SERPL-CCNC: 84 U/L (ref 40–150)
ALT SERPL W P-5'-P-CCNC: 49 U/L (ref 0–50)
ANION GAP SERPL CALCULATED.3IONS-SCNC: 5 MMOL/L (ref 3–14)
AST SERPL W P-5'-P-CCNC: 39 U/L (ref 0–45)
BILIRUB SERPL-MCNC: 0.3 MG/DL (ref 0.2–1.3)
BUN SERPL-MCNC: 13 MG/DL (ref 7–30)
CALCIUM SERPL-MCNC: 9.5 MG/DL (ref 8.5–10.1)
CHLORIDE SERPL-SCNC: 103 MMOL/L (ref 94–109)
CHOLEST SERPL-MCNC: 281 MG/DL
CO2 SERPL-SCNC: 27 MMOL/L (ref 20–32)
CREAT SERPL-MCNC: 0.78 MG/DL (ref 0.52–1.04)
CREAT UR-MCNC: 89 MG/DL
GFR SERPL CREATININE-BSD FRML MDRD: 78 ML/MIN/{1.73_M2}
GLUCOSE SERPL-MCNC: 115 MG/DL (ref 70–99)
HDLC SERPL-MCNC: 59 MG/DL
LDLC SERPL CALC-MCNC: 188 MG/DL
MICROALBUMIN UR-MCNC: 7 MG/L
MICROALBUMIN/CREAT UR: 8.35 MG/G CR (ref 0–25)
NONHDLC SERPL-MCNC: 222 MG/DL
POTASSIUM SERPL-SCNC: 4.1 MMOL/L (ref 3.4–5.3)
PROT SERPL-MCNC: 7.8 G/DL (ref 6.8–8.8)
SODIUM SERPL-SCNC: 135 MMOL/L (ref 133–144)
TRIGL SERPL-MCNC: 168 MG/DL

## 2019-06-17 PROCEDURE — 80053 COMPREHEN METABOLIC PANEL: CPT | Performed by: NURSE PRACTITIONER

## 2019-06-17 PROCEDURE — 80061 LIPID PANEL: CPT | Performed by: NURSE PRACTITIONER

## 2019-06-17 PROCEDURE — 82043 UR ALBUMIN QUANTITATIVE: CPT | Performed by: NURSE PRACTITIONER

## 2019-06-17 PROCEDURE — 36415 COLL VENOUS BLD VENIPUNCTURE: CPT | Performed by: NURSE PRACTITIONER

## 2019-06-26 DIAGNOSIS — R73.09 ELEVATED GLUCOSE: Primary | ICD-10-CM

## 2019-07-15 ENCOUNTER — OFFICE VISIT (OUTPATIENT)
Dept: FAMILY MEDICINE | Facility: CLINIC | Age: 69
End: 2019-07-15
Payer: COMMERCIAL

## 2019-07-15 VITALS
OXYGEN SATURATION: 96 % | SYSTOLIC BLOOD PRESSURE: 130 MMHG | TEMPERATURE: 97.8 F | WEIGHT: 143 LBS | HEART RATE: 92 BPM | RESPIRATION RATE: 12 BRPM | DIASTOLIC BLOOD PRESSURE: 78 MMHG | HEIGHT: 64 IN | BODY MASS INDEX: 24.41 KG/M2

## 2019-07-15 DIAGNOSIS — K21.00 GASTROESOPHAGEAL REFLUX DISEASE WITH ESOPHAGITIS: ICD-10-CM

## 2019-07-15 DIAGNOSIS — Z13.220 LIPID SCREENING: Primary | ICD-10-CM

## 2019-07-15 DIAGNOSIS — I10 ESSENTIAL HYPERTENSION WITH GOAL BLOOD PRESSURE LESS THAN 140/90: ICD-10-CM

## 2019-07-15 PROCEDURE — 99213 OFFICE O/P EST LOW 20 MIN: CPT | Performed by: NURSE PRACTITIONER

## 2019-07-15 RX ORDER — PANTOPRAZOLE SODIUM 40 MG/1
TABLET, DELAYED RELEASE ORAL
Qty: 90 TABLET | Refills: 3 | Status: SHIPPED | OUTPATIENT
Start: 2019-07-15 | End: 2019-12-02

## 2019-07-15 RX ORDER — LOSARTAN POTASSIUM 50 MG/1
TABLET ORAL
Qty: 90 TABLET | Refills: 3 | Status: SHIPPED | OUTPATIENT
Start: 2019-07-15 | End: 2020-06-01

## 2019-07-15 RX ORDER — PANTOPRAZOLE SODIUM 40 MG/1
40 TABLET, DELAYED RELEASE ORAL DAILY
Qty: 90 TABLET | Refills: 3 | Status: SHIPPED | OUTPATIENT
Start: 2019-07-15 | End: 2020-09-16

## 2019-07-15 ASSESSMENT — MIFFLIN-ST. JEOR: SCORE: 1158.64

## 2019-07-15 ASSESSMENT — PAIN SCALES - GENERAL: PAINLEVEL: NO PAIN (0)

## 2019-07-15 NOTE — PROGRESS NOTES
Subjective     Karen Hernandez is a 69 year old female who presents to clinic today for the following health issues:    HPI     Medication Followup of pantoprazole 40 mg every day    Taking Medication as prescribed: yes    Side Effects:  None    Medication Helping Symptoms:  yes      Hypertension Follow-up      Do you check your blood pressure regularly outside of the clinic? No     Are you following a low salt diet? Yes    Are your blood pressures ever more than 140 on the top number (systolic) OR more   than 90 on the bottom number (diastolic), for example 140/90? No    Amount of exercise or physical activity: 1 day/week for an average of 15-30 minutes    Problems taking medications regularly: No    Medication side effects: none    Diet: low salt and low fat/cholesterol    Mother takes some medication but  Not for cholesterol  Had been exercising and eating correctly ,  And then stopped exercising = lipid profile was worse.  And she become sedimentary     Would prefer diet and exercise,           Patient Active Problem List   Diagnosis     FHX COLON CANCER - SCOPE Q 5Y     Hyperlipidemia LDL goal <130     AK (actinic keratosis)     Melanocytic nevus     Recurrent cold sores     Herpes simplex of eye     24 hour contact handout given     Loss of height     Dermatitis     Gastroesophageal reflux disease with esophagitis     Essential hypertension with goal blood pressure less than 140/90     Osteopenia     Elevated glucose     Keratosis, seborrheic     Advanced directives, counseling/discussion     Past Surgical History:   Procedure Laterality Date     C APPENDECTOMY  1960     COLONOSCOPY  8/10/2012    Procedure: COLONOSCOPY;  Colonoscopy;  Surgeon: Carlos Alatorre MD;  Location: [facility]     COLONOSCOPY N/A 1/4/2017    Procedure: COLONOSCOPY;  Surgeon: Stepan Samayoa MD;  Location: [facility]     EYE SURGERY  2017     HYSTERECTOMY, PAP NO LONGER INDICATED  2000    Ovaries left     TONSILLECTOMY  1958        Social History     Tobacco Use     Smoking status: Never Smoker     Smokeless tobacco: Never Used   Substance Use Topics     Alcohol use: Yes     Comment: Social-- 6/wk     Family History   Problem Relation Age of Onset     Cancer - colorectal Father      Cancer Daughter      Breast Cancer Daughter      Congenital Anomalies Daughter         trisomy 18     Diabetes No family hx of      Hypertension No family hx of      Cerebrovascular Disease No family hx of      Prostate Cancer No family hx of          Current Outpatient Medications   Medication Sig Dispense Refill     carboxymethylcellulose (CELLUVISC/REFRESH LIQUIGEL) 1 % ophthalmic solution Place 1 drop into the right eye 2 times daily 15 mL 0     ketoconazole (NIZORAL) 2 % external shampoo Apply to scalp, leave on for 5 minutes, rinse. Use 2-3 times weekly. 120 mL 11     losartan (COZAAR) 50 MG tablet TAKE 1 TAB BY MOUTH DAILY OVER DUE FOR LABS PLEASE CALL FOR LAB ONLY APPOINTMENT AND COME IN FASTING 30 tablet 0     pantoprazole (PROTONIX) 40 MG EC tablet TAKE 1 TABLET (40 MG) BY MOUTH DAILY 90 tablet 3     selenium sulfide (SELSUN) 2.5 % shampoo Apply  topically daily as needed for itching. 120 mL 0     triamcinolone (KENALOG) 0.1 % cream APPLY TOPICALLY TO AFFECTED AREA(S) 2 TIMES DAILY for 7-10 days 30 g 1     triamcinolone (KENALOG) 0.1 % external lotion Apply twice daily as needed to rash on scalp, forehead, use sparingly. 60 mL 3     VALTREX 1 G tablet Take 2 tablets (2,000 mg) by mouth 2 times daily Dispense as written 12 tablet 1     Allergies   Allergen Reactions     Nkda [No Known Drug Allergies]      BP Readings from Last 3 Encounters:   07/15/19 130/78   01/29/19 126/80   12/17/18 144/80    Wt Readings from Last 3 Encounters:   07/15/19 64.9 kg (143 lb)   01/29/19 66.7 kg (147 lb)   12/17/18 66.9 kg (147 lb 6.4 oz)                      Reviewed and updated as needed this visit by Provider         Review of Systems   ROS COMP: CONSTITUTIONAL:  "NEGATIVE for fever, chills, change in weight  ENT/MOUTH: NEGATIVE for ear, mouth and throat problems  RESP: NEGATIVE for significant cough or SOB  CV: NEGATIVE for chest pain, palpitations or peripheral edema,   GI: NEGATIVE for nausea, abdominal pain, heartburn, or change in bowel habits      Objective    /78 (BP Location: Left arm, Patient Position: Chair, Cuff Size: Adult Regular)   Pulse 92   Temp 97.8  F (36.6  C) (Tympanic)   Resp 12   Ht 1.626 m (5' 4\")   Wt 64.9 kg (143 lb)   LMP  (LMP Unknown)   SpO2 96%   Breastfeeding? No   BMI 24.55 kg/m    Body mass index is 24.55 kg/m .  Physical Exam   GENERAL: healthy, alert and no distress  NECK: no adenopathy, no asymmetry, masses, or scars and thyroid normal to palpation  RESP: lungs clear to auscultation - no rales, rhonchi or wheezes  CV: regular rate and rhythm, normal S1 S2, no S3 or S4, no murmur, click or rub, no peripheral edema and peripheral pulses strong  ABDOMEN: soft, nontender, no hepatosplenomegaly, no masses and bowel sounds normal  MS: no gross musculoskeletal defects noted, no edema    Diagnostic Test Results:  Labs reviewed in Epic  Pending         ASSESSMENT/PLAN:      ICD-10-CM    1. Lipid screening Z13.220 Lipid panel reflex to direct LDL Fasting     **Comprehensive metabolic panel FUTURE anytime   2. Essential hypertension with goal blood pressure less than 140/90 I10 losartan (COZAAR) 50 MG tablet     **Comprehensive metabolic panel FUTURE anytime   3. Gastroesophageal reflux disease with esophagitis K21.0 pantoprazole (PROTONIX) 40 MG EC tablet     **Comprehensive metabolic panel FUTURE anytime       Patient Instructions   I agree with the exercise and diet changes and then recheck your cholesterol      Limit the simple sugars and the simple carbohydrates   The Mediterranean diet is very good to improve your lipid profile        Recheck your cholesterol before the end of the year.     You will need to make a lab only " appointment,  [phone].    Come in fasting

## 2019-07-15 NOTE — PATIENT INSTRUCTIONS
I agree with the exercise and diet changes and then recheck your cholesterol      Limit the simple sugars and the simple carbohydrates   The Mediterranean diet is very good to improve your lipid profile        Recheck your cholesterol before the end of the year.     You will need to make a lab only appointment,  [phone].    Come in fasting

## 2019-07-15 NOTE — TELEPHONE ENCOUNTER
Routing refill request to provider for review/approval because: Office Visit today, 7/15/19    Yovana CHOW RN

## 2019-07-15 NOTE — TELEPHONE ENCOUNTER
"Requested Prescriptions   Pending Prescriptions Disp Refills     pantoprazole (PROTONIX) 40 MG EC tablet [Pharmacy Med Name: PANTOPRAZOLE SOD DR 40 MG TAB]  Last Written Prescription Date:  8/2/18  Last Fill Quantity: 90,  # refills: 3   Last office visit: 1/29/2019 with prescribing provider:  Ivy   Future Office Visit:   Next 5 appointments (look out 90 days)    Jul 15, 2019 10:20 AM CDT  [facility] C[facility] [address] [address]  [phone]          90 tablet 3     Sig: TAKE 1 TABLET BY MOUTH ONCE DAILY       PPI Protocol Passed - 7/15/2019  1:30 AM        Passed - Not on Clopidogrel (unless Pantoprazole ordered)        Passed - No diagnosis of osteoporosis on record        Passed - Recent (12 mo) or future (30 days) visit within the authorizing provider's specialty     Patient had office visit in the last 12 months or has a visit in the next 30 days with authorizing provider or within the authorizing provider's specialty.  See \"Patient Info\" tab in inbasket, or \"Choose Columns\" in Meds & Orders section of the refill encounter.              Passed - Medication is active on med list        Passed - Patient is age 18 or older        Passed - No active pregnacy on record        Passed - No positive pregnancy test in past 12 months          "

## 2019-09-06 ENCOUNTER — HOSPITAL ENCOUNTER (OUTPATIENT)
Dept: MAMMOGRAPHY | Facility: CLINIC | Age: 69
Discharge: HOME | End: 2019-09-06
Attending: NURSE PRACTITIONER | Admitting: NURSE PRACTITIONER
Payer: COMMERCIAL

## 2019-09-06 DIAGNOSIS — Z12.31 VISIT FOR SCREENING MAMMOGRAM: ICD-10-CM

## 2019-09-06 PROCEDURE — 77063 BREAST TOMOSYNTHESIS BI: CPT

## 2019-09-30 ENCOUNTER — HEALTH MAINTENANCE LETTER (OUTPATIENT)
Age: 69
End: 2019-09-30

## 2019-10-22 ENCOUNTER — IMMUNIZATION (OUTPATIENT)
Dept: FAMILY MEDICINE | Facility: CLINIC | Age: 69
End: 2019-10-22
Payer: COMMERCIAL

## 2019-10-22 DIAGNOSIS — Z23 NEED FOR PROPHYLACTIC VACCINATION AND INOCULATION AGAINST INFLUENZA: Primary | ICD-10-CM

## 2019-10-22 PROCEDURE — 90662 IIV NO PRSV INCREASED AG IM: CPT

## 2019-10-22 PROCEDURE — G0008 ADMIN INFLUENZA VIRUS VAC: HCPCS

## 2019-10-22 PROCEDURE — 99207 ZZC NO CHARGE NURSE ONLY: CPT

## 2019-12-02 ENCOUNTER — TELEPHONE (OUTPATIENT)
Dept: FAMILY MEDICINE | Facility: CLINIC | Age: 69
End: 2019-12-02

## 2019-12-02 ENCOUNTER — APPOINTMENT (OUTPATIENT)
Dept: CT IMAGING | Facility: CLINIC | Age: 69
End: 2019-12-02
Attending: EMERGENCY MEDICINE
Payer: COMMERCIAL

## 2019-12-02 ENCOUNTER — HOSPITAL ENCOUNTER (EMERGENCY)
Facility: CLINIC | Age: 69
Discharge: HOME | End: 2019-12-02
Attending: EMERGENCY MEDICINE | Admitting: EMERGENCY MEDICINE
Payer: COMMERCIAL

## 2019-12-02 VITALS
DIASTOLIC BLOOD PRESSURE: 86 MMHG | RESPIRATION RATE: 16 BRPM | WEIGHT: 147 LBS | HEART RATE: 96 BPM | OXYGEN SATURATION: 98 % | TEMPERATURE: 98.5 F | HEIGHT: 64 IN | BODY MASS INDEX: 25.1 KG/M2 | SYSTOLIC BLOOD PRESSURE: 147 MMHG

## 2019-12-02 DIAGNOSIS — R19.7 DIARRHEA, UNSPECIFIED TYPE: ICD-10-CM

## 2019-12-02 DIAGNOSIS — R10.9 ABDOMINAL CRAMPING: ICD-10-CM

## 2019-12-02 DIAGNOSIS — R93.5 ABNORMAL CT OF THE ABDOMEN: ICD-10-CM

## 2019-12-02 LAB
ALBUMIN SERPL-MCNC: 3.2 G/DL (ref 3.4–5)
ALBUMIN UR-MCNC: NEGATIVE MG/DL
ALP SERPL-CCNC: 95 U/L (ref 40–150)
ALT SERPL W P-5'-P-CCNC: 25 U/L (ref 0–50)
ANION GAP SERPL CALCULATED.3IONS-SCNC: 3 MMOL/L (ref 3–14)
APPEARANCE UR: CLEAR
AST SERPL W P-5'-P-CCNC: 16 U/L (ref 0–45)
BASOPHILS # BLD AUTO: 0.1 10E9/L (ref 0–0.2)
BASOPHILS NFR BLD AUTO: 0.4 %
BILIRUB SERPL-MCNC: 0.4 MG/DL (ref 0.2–1.3)
BILIRUB UR QL STRIP: NEGATIVE
BUN SERPL-MCNC: 11 MG/DL (ref 7–30)
CALCIUM SERPL-MCNC: 8.6 MG/DL (ref 8.5–10.1)
CHLORIDE SERPL-SCNC: 105 MMOL/L (ref 94–109)
CO2 SERPL-SCNC: 28 MMOL/L (ref 20–32)
COLOR UR AUTO: NORMAL
CREAT SERPL-MCNC: 0.71 MG/DL (ref 0.52–1.04)
DIFFERENTIAL METHOD BLD: ABNORMAL
EOSINOPHIL # BLD AUTO: 0.3 10E9/L (ref 0–0.7)
EOSINOPHIL NFR BLD AUTO: 2.1 %
ERYTHROCYTE [DISTWIDTH] IN BLOOD BY AUTOMATED COUNT: 12 % (ref 10–15)
GFR SERPL CREATININE-BSD FRML MDRD: 87 ML/MIN/{1.73_M2}
GLUCOSE SERPL-MCNC: 112 MG/DL (ref 70–99)
GLUCOSE UR STRIP-MCNC: NEGATIVE MG/DL
HCT VFR BLD AUTO: 40 % (ref 35–47)
HGB BLD-MCNC: 13.2 G/DL (ref 11.7–15.7)
HGB UR QL STRIP: NEGATIVE
IMM GRANULOCYTES # BLD: 0.1 10E9/L (ref 0–0.4)
IMM GRANULOCYTES NFR BLD: 0.6 %
KETONES UR STRIP-MCNC: NEGATIVE MG/DL
LEUKOCYTE ESTERASE UR QL STRIP: NEGATIVE
LYMPHOCYTES # BLD AUTO: 1.7 10E9/L (ref 0.8–5.3)
LYMPHOCYTES NFR BLD AUTO: 13.7 %
MCH RBC QN AUTO: 31.5 PG (ref 26.5–33)
MCHC RBC AUTO-ENTMCNC: 33 G/DL (ref 31.5–36.5)
MCV RBC AUTO: 96 FL (ref 78–100)
MONOCYTES # BLD AUTO: 0.9 10E9/L (ref 0–1.3)
MONOCYTES NFR BLD AUTO: 7.5 %
NEUTROPHILS # BLD AUTO: 9.3 10E9/L (ref 1.6–8.3)
NEUTROPHILS NFR BLD AUTO: 75.7 %
NITRATE UR QL: NEGATIVE
NRBC # BLD AUTO: 0 10*3/UL
NRBC BLD AUTO-RTO: 0 /100
PH UR STRIP: 6 PH (ref 5–7)
PLATELET # BLD AUTO: 303 10E9/L (ref 150–450)
POTASSIUM SERPL-SCNC: 3.5 MMOL/L (ref 3.4–5.3)
PROT SERPL-MCNC: 7.1 G/DL (ref 6.8–8.8)
RBC # BLD AUTO: 4.19 10E12/L (ref 3.8–5.2)
RBC #/AREA URNS AUTO: 0 /HPF (ref 0–2)
SODIUM SERPL-SCNC: 136 MMOL/L (ref 133–144)
SOURCE: NORMAL
SP GR UR STRIP: 1.01 (ref 1–1.03)
UROBILINOGEN UR STRIP-MCNC: 0 MG/DL (ref 0–2)
WBC # BLD AUTO: 12.3 10E9/L (ref 4–11)
WBC #/AREA URNS AUTO: <1 /HPF (ref 0–5)

## 2019-12-02 PROCEDURE — 74177 CT ABD & PELVIS W/CONTRAST: CPT

## 2019-12-02 PROCEDURE — 85025 COMPLETE CBC W/AUTO DIFF WBC: CPT | Performed by: EMERGENCY MEDICINE

## 2019-12-02 PROCEDURE — 80053 COMPREHEN METABOLIC PANEL: CPT | Performed by: EMERGENCY MEDICINE

## 2019-12-02 PROCEDURE — 25800030 ZZH RX IP 258 OP 636: Performed by: EMERGENCY MEDICINE

## 2019-12-02 PROCEDURE — 81001 URINALYSIS AUTO W/SCOPE: CPT | Performed by: EMERGENCY MEDICINE

## 2019-12-02 PROCEDURE — 25000125 ZZHC RX 250: Performed by: EMERGENCY MEDICINE

## 2019-12-02 PROCEDURE — 96360 HYDRATION IV INFUSION INIT: CPT | Mod: 59 | Performed by: EMERGENCY MEDICINE

## 2019-12-02 PROCEDURE — 25000128 H RX IP 250 OP 636: Performed by: EMERGENCY MEDICINE

## 2019-12-02 PROCEDURE — 87506 IADNA-DNA/RNA PROBE TQ 6-11: CPT | Performed by: EMERGENCY MEDICINE

## 2019-12-02 PROCEDURE — 87177 OVA AND PARASITES SMEARS: CPT | Performed by: EMERGENCY MEDICINE

## 2019-12-02 PROCEDURE — 87209 SMEAR COMPLEX STAIN: CPT | Performed by: EMERGENCY MEDICINE

## 2019-12-02 PROCEDURE — 99285 EMERGENCY DEPT VISIT HI MDM: CPT | Mod: 25 | Performed by: EMERGENCY MEDICINE

## 2019-12-02 PROCEDURE — 99284 EMERGENCY DEPT VISIT MOD MDM: CPT | Mod: Z6 | Performed by: EMERGENCY MEDICINE

## 2019-12-02 PROCEDURE — 96361 HYDRATE IV INFUSION ADD-ON: CPT | Performed by: EMERGENCY MEDICINE

## 2019-12-02 RX ORDER — HYDROCODONE BITARTRATE AND ACETAMINOPHEN 5; 325 MG/1; MG/1
1 TABLET ORAL EVERY 6 HOURS PRN
Qty: 7 TABLET | Refills: 0 | Status: SHIPPED | OUTPATIENT
Start: 2019-12-02 | End: 2020-03-01

## 2019-12-02 RX ORDER — IOPAMIDOL 755 MG/ML
71 INJECTION, SOLUTION INTRAVASCULAR ONCE
Status: COMPLETED | OUTPATIENT
Start: 2019-12-02 | End: 2019-12-02

## 2019-12-02 RX ADMIN — SODIUM CHLORIDE 58 ML: 9 INJECTION, SOLUTION INTRAVENOUS at 17:28

## 2019-12-02 RX ADMIN — SODIUM CHLORIDE, POTASSIUM CHLORIDE, SODIUM LACTATE AND CALCIUM CHLORIDE 1000 ML: 600; 310; 30; 20 INJECTION, SOLUTION INTRAVENOUS at 17:14

## 2019-12-02 RX ADMIN — IOPAMIDOL 71 ML: 755 INJECTION, SOLUTION INTRAVENOUS at 17:27

## 2019-12-02 ASSESSMENT — ENCOUNTER SYMPTOMS
ENDOCRINE NEGATIVE: 1
ABDOMINAL PAIN: 1
HEMATOLOGIC/LYMPHATIC NEGATIVE: 1
PSYCHIATRIC NEGATIVE: 1
NEUROLOGICAL NEGATIVE: 1
CONSTITUTIONAL NEGATIVE: 1
EYES NEGATIVE: 1
ALLERGIC/IMMUNOLOGIC NEGATIVE: 1
RESPIRATORY NEGATIVE: 1
DIARRHEA: 1
MUSCULOSKELETAL NEGATIVE: 1
CARDIOVASCULAR NEGATIVE: 1

## 2019-12-02 ASSESSMENT — MIFFLIN-ST. JEOR: SCORE: 1176.79

## 2019-12-02 NOTE — TELEPHONE ENCOUNTER
Pt called and left a msg on VM  That she has been having some very intense cramping over the last 4-5 days .     Mesha Martin, [place]

## 2019-12-02 NOTE — ED AVS SNAPSHOT
[facility] Emergency Department  [address] [address]  Phone:  [phone]  Fax:  [phone]                                    Karen Hernandez   MRN: [id number]    Department:  [facility] Emergency Department   Date of Visit:  12/2/2019           After Visit Summary Signature Page    I have received my discharge instructions, and my questions have been answered. I have discussed any challenges I see with this plan with the nurse or doctor.    ..........................................................................................................................................  Patient/Patient Representative Signature      ..........................................................................................................................................  Patient Representative Print Name and Relationship to Patient    ..................................................               ................................................  Date                                   Time    ..........................................................................................................................................  Reviewed by Signature/Title    ...................................................              ..............................................  Date                                               Time          22EPIC Rev 08/18

## 2019-12-02 NOTE — TELEPHONE ENCOUNTER
"Call placed to patient.  Reports cold like illness started 1.5 weeks ago. Cough, chills/sweats, did not check temperature.  Patient reports day 5 of abdominal cramping \"doubles over\". Reports pain across low abdomen, distended. Loose stool 10-15 times daily for the last 4-5 days. Denies emesis.  Stool is liquid, denies black or red in stool. External hemorrhoids are irritated per patient, skin tender-advised topical protective ointment(desitin or A&D).  Patient has had poor appetite, eating small amounts, bland food, stool after any po intake.  + dizzy light headed yesterday, has been \"laying low\" today, less dizziness.  Urine output less frequent, \"might be\" a bit darker in color-unsure.    Denies ill contacts.  No recent antibiotic use.  Shares bathroom with , asymptomatic.  Patient has appointment scheduled for tomorrow with AMRITA Mcmanus, will review with provider as this writer is concerned with need for ED/UC assessment possible dehydration due to duration of illness and amount of loose stool. Will consult provider and call patient back, patient agrees with plan.  Ce Cartagena RN             "

## 2019-12-02 NOTE — ED PROVIDER NOTES
"  History     Chief Complaint   Patient presents with     Diarrhea     for 10 days, also has chills     Abdominal Pain     HPI  Karen Hernandez is a 69 year old female with history of hyperlipidemia, hypertension, GERD with esophagitis and a history of recurrent cold sores who presents for evaluation for abdominal pain, with report of diarrhea for the last 10 days with chills. The patient reports that both the episodes of diarrhea and abdominal cramping has increasing gotten worse. On average she state that she is having episodes of diarrhea approximatly 10 to 15 times a day. She reports that she has tried to continue eating, but approximatly an hour after she will have a bowl movement. and her lower abdominal cramping will become worse. The patient reports that her stool is has been brown in color, she denies any blood. She states that she has recently been around her grandchildren that had \"cold like\" symptoms, but she is unsure if they had diarrhea. The patient states that she has \"city water\" at her home, and denies recent travel, change in diet, nausea, emesis, and states she has not been on any antibiotics in the last 3 months.    Social history: The patient lives in [facility]. She arrives to the ED by private car with .    Allergies:  Allergies   Allergen Reactions     Nkda [No Known Drug Allergies]        Problem List:    Patient Active Problem List    Diagnosis Date Noted     FHX COLON CANCER - SCOPE Q 5Y 05/17/2007     Priority: High     2007 negative.        Advanced directives, counseling/discussion 10/17/2017     Priority: Medium     Advance Care Planning 10/17/2017: ACP Review of Chart / Resources Provided:  Reviewed chart for advance care plan.  Karen Hernandez has been provided information and resources to begin or update their advance care plan.  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 Provider to review and confirm.)       Hyperlipidemia LDL goal <130 09/29/2008     Priority: Medium     September 29, 2008 - Desires lifestyle initially.  Recheck 3-6mo.          24 hour contact handout given 02/23/2012     Priority: Low     EMERGENCY CARE PLAN  Presenting Problem Signs and Symptoms Treatment Plan    Questions or conerns during clinic hours    I will call the clinic directly     Questions or conerns outside clinic hours    I will call the 24 hour nurse line at [phone]    Patient needs to schedule an appointment    I will call the 24 hour scheduling team at [phone] or clinic directly    Same day treatment     I will call the clinic first, nurse line if after hours, urgent care and express care if needed                                    Past Medical History:    Past Medical History:   Diagnosis Date     AK (actinic keratosis) 4/14/2011     FHX COLON CANCER - SCOPE Q 5Y 5/17/2007     GERD (gastroesophageal reflux disease) 5/20/2011     Herpes simplex of eye 5/20/2011     Hyperlipidemia LDL goal <130 9/29/2008     Moles 4/14/2011     NO ACTIVE PROBLEMS        Past Surgical History:    Past Surgical History:   Procedure Laterality Date     C APPENDECTOMY  1960     COLONOSCOPY  8/10/2012    Procedure: COLONOSCOPY;  Colonoscopy;  Surgeon: Carlos Alaotrre MD;  Location: [facility]      "COLONOSCOPY N/A 1/4/2017    Procedure: COLONOSCOPY;  Surgeon: Stepan Samayoa MD;  Location: [facility]     EYE SURGERY  2017     HYSTERECTOMY, PAP NO LONGER INDICATED  2000    Ovaries left     TONSILLECTOMY  1958       Family History:    Family History   Problem Relation Age of Onset     Cancer - colorectal Father      Cancer Daughter      Breast Cancer Daughter      Congenital Anomalies Daughter         trisomy 18     Diabetes No family hx of      Hypertension No family hx of      Cerebrovascular Disease No family hx of      Prostate Cancer No family hx of        Social History:  Marital Status:   [2]  Social History     Tobacco Use     Smoking status: Never Smoker     Smokeless tobacco: Never Used   Substance Use Topics     Alcohol use: Yes     Comment: Social-- 6/wk     Drug use: No        Medications:    amoxicillin-clavulanate (AUGMENTIN) 875-125 MG tablet  HYDROcodone-acetaminophen (NORCO) 5-325 MG tablet  ketoconazole (NIZORAL) 2 % external shampoo  losartan (COZAAR) 50 MG tablet  pantoprazole (PROTONIX) 40 MG EC tablet  psyllium (METAMUCIL/KONSYL) 58.6 % powder  VALTREX 1 G tablet          Review of Systems   Constitutional: Negative.    HENT: Negative.    Eyes: Negative.    Respiratory: Negative.    Cardiovascular: Negative.    Gastrointestinal: Positive for abdominal pain and diarrhea.   Endocrine: Negative.    Genitourinary: Negative.    Musculoskeletal: Negative.    Skin: Negative.    Allergic/Immunologic: Negative.    Neurological: Negative.    Hematological: Negative.    Psychiatric/Behavioral: Negative.    All other systems reviewed and are negative.      Physical Exam   BP: (!) 154/79  Pulse: 101  Temp: 98.5  F (36.9  C)  Resp: 16  Height: 162.6 cm (5' 4\")  Weight: 66.7 kg (147 lb)(stated)  SpO2: 98 %      Physical Exam  Constitutional:       General: She is not in acute distress.     Appearance: She is normal weight. She is not ill-appearing, toxic-appearing or diaphoretic.   HENT:      " Head: Normocephalic and atraumatic.      Mouth/Throat:      Mouth: Mucous membranes are moist.      Pharynx: Oropharynx is clear. No pharyngeal swelling.   Eyes:      General: No scleral icterus.     Extraocular Movements: Extraocular movements intact.      Pupils: Pupils are equal, round, and reactive to light.   Cardiovascular:      Rate and Rhythm: Normal rate and regular rhythm.      Heart sounds: No murmur. No friction rub. No gallop.    Pulmonary:      Effort: Pulmonary effort is normal. No respiratory distress.      Breath sounds: Normal breath sounds. No stridor. No wheezing, rhonchi or rales.   Chest:      Chest wall: No tenderness.   Abdominal:      General: Bowel sounds are decreased. There is no distension or abdominal bruit. There are no signs of injury.      Palpations: There is no shifting dullness, fluid wave, hepatomegaly, splenomegaly, mass or pulsatile mass.      Tenderness: There is abdominal tenderness in the left lower quadrant.       Skin:     Capillary Refill: Capillary refill takes less than 2 seconds.      Coloration: Skin is not cyanotic, jaundiced, mottled or pale.      Findings: No rash.   Neurological:      General: No focal deficit present.      Mental Status: She is alert and oriented to person, place, and time.      Cranial Nerves: No cranial nerve deficit.      Motor: No weakness.   Psychiatric:         Mood and Affect: Mood normal. Mood is not anxious or depressed.         Behavior: Behavior normal.         ED Course        Procedures               Critical Care time:  none               ED medications:  Medications   lactated ringers BOLUS 1,000 mL (0 mLs Intravenous Stopped 12/2/19 1917)   iopamidol (ISOVUE-370) solution 71 mL (71 mLs Intravenous Given 12/2/19 1727)   sodium chloride 0.9 % bag 500mL for CT scan flush use (58 mLs As instructed Given 12/2/19 1728)         ED Vitals:  Vitals:    12/02/19 1800 12/02/19 1802 12/02/19 1830 12/02/19 1835   BP: (!) 143/88  (!) 140/82  117/79   Pulse: 92  94 94   Resp:       Temp:       TempSrc:       SpO2:  98% 98% 98%   Weight:       Height:         Prior or recent diagnostic imaging and or work-up:  Colonoscopy 1/4/2017  Findings:        The perianal and digital rectal examinations were normal.        The colon (entire examined portion) appeared normal.        No additional abnormalities were found on retroflexion.                                                                                     Impression:          - The entire examined colon is normal.   Recommendation:      - Repeat colonoscopy in 5 years for screening purposes.                                                                                       Signed electronically by Theron Samayoa MD   ____________________   Stepan Samayoa MD   1/4/2017 11:19 AM   Number of Addenda: 0     Note Initiated On: 1/4/2017 10:41 AM   Scope In: 12:00:00 AM   Scope Out: 12:00:00 AM     ED labs and imaging:  Results for orders placed or performed during the hospital encounter of 12/02/19 (from the past 24 hour(s))   UA reflex to Microscopic   Result Value Ref Range    Color Urine Blue     Appearance Urine Clear     Glucose Urine Negative NEG^Negative mg/dL    Bilirubin Urine Negative NEG^Negative    Ketones Urine Negative NEG^Negative mg/dL    Specific Gravity Urine 1.006 1.003 - 1.035    Blood Urine Negative NEG^Negative    pH Urine 6.0 5.0 - 7.0 pH    Protein Albumin Urine Negative NEG^Negative mg/dL    Urobilinogen mg/dL 0.0 0.0 - 2.0 mg/dL    Nitrite Urine Negative NEG^Negative    Leukocyte Esterase Urine Negative NEG^Negative    Source Midstream Urine     RBC Urine 0 0 - 2 /HPF    WBC Urine <1 0 - 5 /HPF   CBC with platelets differential   Result Value Ref Range    WBC 12.3 (H) 4.0 - 11.0 10e9/L    RBC Count 4.19 3.8 - 5.2 10e12/L    Hemoglobin 13.2 11.7 - 15.7 g/dL    Hematocrit 40.0 35.0 - 47.0 %    MCV 96 78 - 100 fl    MCH 31.5 26.5 - 33.0 pg    MCHC 33.0 31.5 - 36.5 g/dL    RDW 12.0  10.0 - 15.0 %    Platelet Count 303 150 - 450 10e9/L    Diff Method Automated Method     % Neutrophils 75.7 %    % Lymphocytes 13.7 %    % Monocytes 7.5 %    % Eosinophils 2.1 %    % Basophils 0.4 %    % Immature Granulocytes 0.6 %    Nucleated RBCs 0 0 /100    Absolute Neutrophil 9.3 (H) 1.6 - 8.3 10e9/L    Absolute Lymphocytes 1.7 0.8 - 5.3 10e9/L    Absolute Monocytes 0.9 0.0 - 1.3 10e9/L    Absolute Eosinophils 0.3 0.0 - 0.7 10e9/L    Absolute Basophils 0.1 0.0 - 0.2 10e9/L    Abs Immature Granulocytes 0.1 0 - 0.4 10e9/L    Absolute Nucleated RBC 0.0    Comprehensive metabolic panel   Result Value Ref Range    Sodium 136 133 - 144 mmol/L    Potassium 3.5 3.4 - 5.3 mmol/L    Chloride 105 94 - 109 mmol/L    Carbon Dioxide 28 20 - 32 mmol/L    Anion Gap 3 3 - 14 mmol/L    Glucose 112 (H) 70 - 99 mg/dL    Urea Nitrogen 11 7 - 30 mg/dL    Creatinine 0.71 0.52 - 1.04 mg/dL    GFR Estimate 87 >60 mL/min/[1.73_m2]    GFR Estimate If Black >90 >60 mL/min/[1.73_m2]    Calcium 8.6 8.5 - 10.1 mg/dL    Bilirubin Total 0.4 0.2 - 1.3 mg/dL    Albumin 3.2 (L) 3.4 - 5.0 g/dL    Protein Total 7.1 6.8 - 8.8 g/dL    Alkaline Phosphatase 95 40 - 150 U/L    ALT 25 0 - 50 U/L    AST 16 0 - 45 U/L   CT Abdomen Pelvis w Contrast    Narrative    CT ABDOMEN PELVIS WITH CONTRAST   12/2/2019 5:34 PM     HISTORY: 10-day history of nonbloody diarrhea with chills and lower  abdominal pain.  Evaluate for acute diverticulitis.    TECHNIQUE:  CT abdomen and pelvis with 71 mL Isovue 370 IV. Radiation  dose for this scan was reduced using automated exposure control,  adjustment of the mA and/or kV according to patient size, or iterative  reconstruction technique.    COMPARISON: None available.    FINDINGS:   Lung bases: Bibasilar atelectasis. Small hiatal hernia.    Abdomen/pelvis: Diffuse hypoattenuation of the liver, likely due to  underlying hepatic steatosis. The gallbladder is unremarkable. No  splenomegaly. No adrenal nodules. No main  pancreatic ductal dilatation  or definite solid pancreatic mass.    No abnormally dilated bowel loops. No significant free fluid in the  abdomen or pelvis. No free peritoneal or portal venous gas. Colonic  predominantly sigmoid diverticulosis with mild colonic wall thickening  and pericolonic fat stranding predominantly of the distal sigmoid  colon and adjacent rectum, finding could represent  colitis/diverticulitis. Trace amount of free fluid in the pelvis. No  evidence of loculated pericolonic collection or evidence of colonic  perforation. Mild fat stranding of the central mesentery with  prominent mesenteric lymph notes could represent mesenteric  panniculitis.    Bones and soft tissues: No suspicious osseous lesion. Degenerative  changes of the bilateral hip joints.      Impression    IMPRESSION:  1. Chronic, predominantly sigmoid diverticulosis with mild colonic  wall thickening and pericolonic fat stranding predominantly of the  distal sigmoid colon and adjacent rectum, finding could represent  colitis/diverticulitis. No evidence of pericolonic loculated  collection or evidence of colonic perforation.  2. Diffuse hypoattenuation of the liver, likely due to underlying  hepatic steatosis.  3. Mild fat stranding of the central mesentery with prominent  mesenteric lymph nodes, findings could represent mesenteric  panniculitis.         Assessments & Plan (with Medical Decision Making)   Clinical impression: 69-year-old female who presented with lower abdominal pain and 10 days of non-bloody diarrhea.  Her work-up in the department suggests symptoms may be due to diverticulitis/mesenteric panniculitis and/or colitis.  CT imaging showed inflammatory changes in the sigmoid colon that may be chronic however there is wall thickening paracolonic fat stranding predominantly in the distal sigmoid colon and adjacent rectum.  At this time plan is to trial pain control see if this will slow her diarrhea and then oral  antibiotics for presumed diagnosis diverticulitis based on CT imaging with close outpatient follow-up.   Patient had no red flags on exam specifically no recent foreign travel, no sick contact, diarrhea has been non-bloody. She reported 10-15 bowel movements per day.  Last bowel movement was about an hour prior to arrival in the department.  No recent antibiotic use in the last 3 months.  She reported no nausea, no vomiting.  Patient is currently on losartan, pantoprazole.  She reported no back pain or flank pain.   When she developed lower abdominal cramping she presented for further care.  She reported she has had multiple prior colonoscopy that have all been within normal limits.  On my exam she appeared in no acute distress she was documented to be tachycardic in triage and afebrile.  She had quiet bowel sounds throughout.  Abdomen was soft nondistended no rebound or guarding.      ED course and Plan:  I reviewed the medical record. Prior colonoscopy in January 2017 that was unremarkable.   Stool studies were requested.  She was given IV fluids.  Imaging was obtained to exclude acute colitis and/or diverticulitis. Work-up in the department today reveals a white count of 12.3, electrolytes are within normal range creatinine was 0.71 and normal liver enzymes. CT imaging with contrast today revealed chronic predominant sigmoid diverticulosis with mild colonic wall thickening  and pericolonic fat stranding predominantly in the distal sigmoid colon and adjacent to the rectum suspicious for colitis or diverticulitis there is no evidence of pericolonic loculated collection or perforation.  See additional detail in the interpretation by the interpreting radiologist.With imaging showing possible colitis and/or diverticulitis and/or mesenteric panniculitis.  We discussed next for care.  Because she currently has diarrhea we discussed the risk and benefit of treating for probable diverticulitis.  We agreed to allow her to  go home she was given pain medication to see if this will help bulk up her stool and she may begin taking oral antibiotics for diverticulitis.  She was discharged home with Augmentin twice daily x7 days.  Reviewed reasons to return to the department for reevaluation additional care.  She expressed comfort and understanding of plan of care.          Disclaimer: This note consists of symbols derived from keyboarding, dictation and/or voice recognition software. As a result, there may be errors in the script that have gone undetected. Please consider this when interpreting information found in this chart.  I have reviewed the nursing notes.    I have reviewed the findings, diagnosis, plan and need for follow up with the patient.       New Prescriptions    AMOXICILLIN-CLAVULANATE (AUGMENTIN) 875-125 MG TABLET    Take 1 tablet by mouth 2 times daily for 7 days    HYDROCODONE-ACETAMINOPHEN (NORCO) 5-325 MG TABLET    Take 1 tablet by mouth every 6 hours as needed       Final diagnoses:   Diarrhea, unspecified type - Over the last 10 days nonbloody,   Abdominal cramping - Likely multifactorial related to diverticulitis/ colitis   Abnormal CT of the abdomen - Imaging today revealed diverticulitis/colitis and mesenteric panniculitis.   This document serves as a record of the services and decisions personally performed and made by Leno Agosto. The HPI was created on his behalf by Ann Madsen, a trained medical scribe. The creation of this document is based on the provider's statements to the medical scribe  Ann Madsen  4:57 PM December 2, 2019 12/2/2019   [facility] EMERGENCY DEPARTMENT     Leno Agosto MD  12/02/19 5653

## 2019-12-02 NOTE — ED NOTES
"Pt reports she has had ongoing diarrhea for the last 10 days. Pt reports having abdominal cramping with the loose stools. Pt denies nausea, vomiting. Pt is afrebrile but reports feeling \"chills\" when this all started. Pt reports she did have a cold prior to all this. Pt reports poor appetite.  "

## 2019-12-02 NOTE — TELEPHONE ENCOUNTER
Called pt. And she is still having 2-3 stools per day ,  Having stomach cramping.  Not much of a fowl odor.     The stools are getting worse and cramping is getting worse.   Feels that she could be getting dehydrated .   Isn't much,  Urine is getting darker,       Did advise her to go to the ER, if they feel she needs IV fluids they could give it to her there and then she could collet the stool specimens and get started on antibiotics.       Cancel her appointment with Ling in the AM if she does go to to the ER. And is feeling better

## 2019-12-02 NOTE — TELEPHONE ENCOUNTER
This writer reviewed telephone encounter with AMRITA Mcmanus as she is scheduled to see patient tomorrow.  Agrees with plan to have patient go to ED/UC for evaluation.  Call placed to patient, relayed recommendation.  Patient agrees to have  to [place] ED/UC.  Patient will keep appointment for tomorrow incase she needs follow up, will cancel if not needed.  Ce Cartagena RN

## 2019-12-03 LAB
C COLI+JEJUNI+LARI FUSA STL QL NAA+PROBE: NOT DETECTED
EC STX1 GENE STL QL NAA+PROBE: NOT DETECTED
EC STX2 GENE STL QL NAA+PROBE: NOT DETECTED
ENTERIC PATHOGEN COMMENT: NORMAL
NOROV GI+II ORF1-ORF2 JNC STL QL NAA+PR: NOT DETECTED
O+P STL MICRO: NORMAL
RVA NSP5 STL QL NAA+PROBE: NOT DETECTED
SALMONELLA SP RPOD STL QL NAA+PROBE: NOT DETECTED
SHIGELLA SP+EIEC IPAH STL QL NAA+PROBE: NOT DETECTED
SPECIMEN SOURCE: NORMAL
V CHOL+PARA RFBL+TRKH+TNAA STL QL NAA+PR: NOT DETECTED
Y ENTERO RECN STL QL NAA+PROBE: NOT DETECTED

## 2019-12-03 NOTE — RESULT ENCOUNTER NOTE
Final Enteric Bacteria and Virus Panel by [facility] Stool is NEGATIVE for Campylobacter group, Salmonella species, Shigella species, Shiga toxin 1 gene, Shiga toxin 2 gene, Vibrio group,  Yersinia enterocolitica,  Rotavirus A,  and Norovirus I and II.    No treatment or change in treatment per [facility] Lab Result protocol.

## 2019-12-03 NOTE — DISCHARGE INSTRUCTIONS
1) we have discussed that your symptoms may be due to colitis/diverticulitis.  We discussed treatment options for diverticulitis based on your ongoing diarrhea.  We have agreed to allow you to take pain medication for abdominal cramping and discomfort to see if this will help bulk up your stool and slow your diarrhea.    2) we also discussed antibiotic treatment for diverticulitis based on CT imaging results.  We have agreed to put her on Augmentin twice a day for the next 1 week.  Consider starting this once her diarrhea improves.  If your diarrhea does not improve or resolve after taking pain medication for 72 hours it would be beneficial to be reexamined prior to starting antibiotics as we have discussed the risk of taking antibiotics with active diarrhea.    3) consider taking bulking agents such as loperamide, consider also taking probiotics including yogurt    4) you develop a fever you this diarrheal stool becomes bloody you should return to the department to be reexamined

## 2020-01-31 ENCOUNTER — MYC MEDICAL ADVICE (OUTPATIENT)
Dept: FAMILY MEDICINE | Facility: CLINIC | Age: 70
End: 2020-01-31

## 2020-02-10 ENCOUNTER — TELEPHONE (OUTPATIENT)
Dept: FAMILY MEDICINE | Facility: CLINIC | Age: 70
End: 2020-02-10

## 2020-02-10 DIAGNOSIS — B00.1 RECURRENT COLD SORES: ICD-10-CM

## 2020-02-10 DIAGNOSIS — B00.50 HERPES SIMPLEX OF EYE: ICD-10-CM

## 2020-02-10 RX ORDER — VALACYCLOVIR HCL 1000 MG
2000 TABLET ORAL 2 TIMES DAILY
Qty: 12 TABLET | Refills: 1 | Status: SHIPPED | OUTPATIENT
Start: 2020-02-10 | End: 2020-03-06

## 2020-02-10 NOTE — TELEPHONE ENCOUNTER
Patient is requesting a refill on her valtrex ASAP as she is leaving on a trip tomorrow morning.  Please contact patient when sent to pharmacy.  Pharmacy selected.    Stewart Schilling [facility]

## 2020-02-12 NOTE — TELEPHONE ENCOUNTER
Rx for valtrex was filled as [facility]  Pharmacy did not have brand, will contact pt and let her know for next fill  NOVA Gomez  RN/Stewart Atkinson

## 2020-03-01 ENCOUNTER — NURSE TRIAGE (OUTPATIENT)
Dept: NURSING | Facility: CLINIC | Age: 70
End: 2020-03-01

## 2020-03-01 ENCOUNTER — HOSPITAL ENCOUNTER (EMERGENCY)
Facility: CLINIC | Age: 70
Discharge: HOME | End: 2020-03-01
Attending: FAMILY MEDICINE | Admitting: FAMILY MEDICINE
Payer: COMMERCIAL

## 2020-03-01 VITALS
HEIGHT: 64 IN | OXYGEN SATURATION: 97 % | HEART RATE: 97 BPM | BODY MASS INDEX: 23.56 KG/M2 | DIASTOLIC BLOOD PRESSURE: 74 MMHG | SYSTOLIC BLOOD PRESSURE: 123 MMHG | RESPIRATION RATE: 18 BRPM | WEIGHT: 138 LBS | TEMPERATURE: 98.1 F

## 2020-03-01 DIAGNOSIS — K57.32 DIVERTICULITIS OF COLON: ICD-10-CM

## 2020-03-01 LAB
ANION GAP SERPL CALCULATED.3IONS-SCNC: 7 MMOL/L (ref 3–14)
BASOPHILS # BLD AUTO: 0 10E9/L (ref 0–0.2)
BASOPHILS NFR BLD AUTO: 0.4 %
BUN SERPL-MCNC: 10 MG/DL (ref 7–30)
CALCIUM SERPL-MCNC: 9 MG/DL (ref 8.5–10.1)
CHLORIDE SERPL-SCNC: 104 MMOL/L (ref 94–109)
CO2 SERPL-SCNC: 27 MMOL/L (ref 20–32)
CREAT SERPL-MCNC: 0.78 MG/DL (ref 0.52–1.04)
DIFFERENTIAL METHOD BLD: NORMAL
EOSINOPHIL # BLD AUTO: 0 10E9/L (ref 0–0.7)
EOSINOPHIL NFR BLD AUTO: 0.1 %
ERYTHROCYTE [DISTWIDTH] IN BLOOD BY AUTOMATED COUNT: 12.9 % (ref 10–15)
GFR SERPL CREATININE-BSD FRML MDRD: 78 ML/MIN/{1.73_M2}
GLUCOSE SERPL-MCNC: 111 MG/DL (ref 70–99)
HCT VFR BLD AUTO: 41.7 % (ref 35–47)
HGB BLD-MCNC: 13.7 G/DL (ref 11.7–15.7)
IMM GRANULOCYTES # BLD: 0 10E9/L (ref 0–0.4)
IMM GRANULOCYTES NFR BLD: 0.4 %
LYMPHOCYTES # BLD AUTO: 1 10E9/L (ref 0.8–5.3)
LYMPHOCYTES NFR BLD AUTO: 11.4 %
MCH RBC QN AUTO: 31.1 PG (ref 26.5–33)
MCHC RBC AUTO-ENTMCNC: 32.9 G/DL (ref 31.5–36.5)
MCV RBC AUTO: 95 FL (ref 78–100)
MONOCYTES # BLD AUTO: 0.6 10E9/L (ref 0–1.3)
MONOCYTES NFR BLD AUTO: 6.4 %
NEUTROPHILS # BLD AUTO: 7 10E9/L (ref 1.6–8.3)
NEUTROPHILS NFR BLD AUTO: 81.3 %
NRBC # BLD AUTO: 0 10*3/UL
NRBC BLD AUTO-RTO: 0 /100
PLATELET # BLD AUTO: 285 10E9/L (ref 150–450)
POTASSIUM SERPL-SCNC: 3.8 MMOL/L (ref 3.4–5.3)
RBC # BLD AUTO: 4.41 10E12/L (ref 3.8–5.2)
SODIUM SERPL-SCNC: 138 MMOL/L (ref 133–144)
WBC # BLD AUTO: 8.6 10E9/L (ref 4–11)

## 2020-03-01 PROCEDURE — 25000128 H RX IP 250 OP 636: Performed by: FAMILY MEDICINE

## 2020-03-01 PROCEDURE — 96375 TX/PRO/DX INJ NEW DRUG ADDON: CPT | Performed by: FAMILY MEDICINE

## 2020-03-01 PROCEDURE — 85025 COMPLETE CBC W/AUTO DIFF WBC: CPT | Performed by: FAMILY MEDICINE

## 2020-03-01 PROCEDURE — 99284 EMERGENCY DEPT VISIT MOD MDM: CPT | Mod: 25 | Performed by: FAMILY MEDICINE

## 2020-03-01 PROCEDURE — 25800030 ZZH RX IP 258 OP 636: Performed by: FAMILY MEDICINE

## 2020-03-01 PROCEDURE — 80048 BASIC METABOLIC PNL TOTAL CA: CPT | Performed by: FAMILY MEDICINE

## 2020-03-01 PROCEDURE — 96365 THER/PROPH/DIAG IV INF INIT: CPT | Performed by: FAMILY MEDICINE

## 2020-03-01 PROCEDURE — 99284 EMERGENCY DEPT VISIT MOD MDM: CPT | Mod: Z6 | Performed by: FAMILY MEDICINE

## 2020-03-01 RX ORDER — KETOROLAC TROMETHAMINE 15 MG/ML
15 INJECTION, SOLUTION INTRAMUSCULAR; INTRAVENOUS ONCE
Status: COMPLETED | OUTPATIENT
Start: 2020-03-01 | End: 2020-03-01

## 2020-03-01 RX ORDER — AMPICILLIN AND SULBACTAM 2; 1 G/1; G/1
3 INJECTION, POWDER, FOR SOLUTION INTRAMUSCULAR; INTRAVENOUS ONCE
Status: COMPLETED | OUTPATIENT
Start: 2020-03-01 | End: 2020-03-01

## 2020-03-01 RX ADMIN — SODIUM CHLORIDE, POTASSIUM CHLORIDE, SODIUM LACTATE AND CALCIUM CHLORIDE 1000 ML: 600; 310; 30; 20 INJECTION, SOLUTION INTRAVENOUS at 14:59

## 2020-03-01 RX ADMIN — AMPICILLIN SODIUM AND SULBACTAM SODIUM 3 G: 2; 1 INJECTION, POWDER, FOR SOLUTION INTRAMUSCULAR; INTRAVENOUS at 15:06

## 2020-03-01 RX ADMIN — KETOROLAC TROMETHAMINE 15 MG: 15 INJECTION, SOLUTION INTRAMUSCULAR; INTRAVENOUS at 15:04

## 2020-03-01 ASSESSMENT — MIFFLIN-ST. JEOR: SCORE: 1135.96

## 2020-03-01 NOTE — DISCHARGE INSTRUCTIONS
Return to the Emergency Room if the following occurs:     Severely worsened pain, dehydration, fainting, or for any concern at anytime.    Or, follow-up with the following provider as we discussed:     Return to your primary doctor as needed, or if not improved over the next 2 to 3 days.    Medications discussed:    Augmentin.  You received your first dose in the ER today.  Take your second dose from the prescription starting this evening.  Ibuprofen 600 mg every six hours for pain (7 days duration).  Tylenol 1000 mg every six hours for pain (7 days duration).  Therefore, you can alternate these every three hours and do it safely.    If you received pain-relieving or sedating medication during your time in the ER, avoid alcohol, driving automobiles, or working with machinery.  Also, a responsible adult must stay with you.        Call the Nurse Advice Line at [phone] or [phone] for any concern at anytime.

## 2020-03-01 NOTE — ED AVS SNAPSHOT
[facility] Emergency Department  [address] [address]  Phone:  [phone]  Fax:  [phone]                                    Karen Hernandez   MRN: [id number]    Department:  [facility] Emergency Department   Date of Visit:  3/1/2020           After Visit Summary Signature Page    I have received my discharge instructions, and my questions have been answered. I have discussed any challenges I see with this plan with the nurse or doctor.    ..........................................................................................................................................  Patient/Patient Representative Signature      ..........................................................................................................................................  Patient Representative Print Name and Relationship to Patient    ..................................................               ................................................  Date                                   Time    ..........................................................................................................................................  Reviewed by Signature/Title    ...................................................              ..............................................  Date                                               Time          22EPIC Rev 08/18

## 2020-03-01 NOTE — ED PROVIDER NOTES
"  HPI   The patient is a 69-year-old female presenting with left lower quadrant and suprapubic abdominal pain and cramping along with diarrhea and hematochezia.  Symptoms began 2 days ago.  She had abdominal cramping and discomfort that would wax and wane without obvious cause.  She had 2-3 episodes of diarrhea over the course of 2 days.  Then, last evening she began to have some more cramping and increased frequency of diarrhea.  She had about 4-5 episodes overnight.  She recognized new hematochezia which was scant and infrequent.  No large clots.  No melena.  She describes chills but no objective fever.  No nausea or vomiting.  She has tenderness in the left lower quadrant and suprapubic area.  She tells me, \"this feels just like my diverticulitis which was diagnosed in about November or December 2019.\"  That diagnosis was given by CT scan.  She was treated successfully with Augmentin.        Allergies:  Allergies   Allergen Reactions     Nkda [No Known Drug Allergies]      Problem List:    Patient Active Problem List    Diagnosis Date Noted     FHX COLON CANCER - SCOPE Q 5Y 05/17/2007     Priority: High     2007 negative.        Advanced directives, counseling/discussion 10/17/2017     Priority: Medium     Advance Care Planning 10/17/2017: ACP Review of Chart / Resources Provided:  Reviewed chart for advance care plan.  Karen Hernandez has been provided information and resources to begin or update their advance care plan.  Added by Elvira Gaspar             Keratosis, seborrheic 02/27/2017     Priority: Medium     Elevated glucose 08/03/2016     Priority: Medium     Osteopenia 07/26/2016     Priority: Medium     Essential hypertension with goal blood pressure less than 140/90 06/15/2016     Priority: Medium     Gastroesophageal reflux disease with esophagitis 11/22/2015     Priority: Medium     Loss of height 08/08/2013     Priority: Medium     Dermatitis 08/08/2013     Priority: Medium     Herpes simplex of eye " 05/20/2011     Priority: Medium     Recurrent cold sores 04/29/2011     Priority: Medium     AK (actinic keratosis) 04/14/2011     Priority: Medium     Melanocytic nevus 04/14/2011     Priority: Medium     (Problem list name updated by automated process. Provider to review and confirm.)       Hyperlipidemia LDL goal <130 09/29/2008     Priority: Medium     September 29, 2008 - Desires lifestyle initially.  Recheck 3-6mo.          24 hour contact handout given 02/23/2012     Priority: Low     EMERGENCY CARE PLAN  Presenting Problem Signs and Symptoms Treatment Plan    Questions or conerns during clinic hours    I will call the clinic directly     Questions or conerns outside clinic hours    I will call the 24 hour nurse line at [phone]    Patient needs to schedule an appointment    I will call the 24 hour scheduling team at [phone] or clinic directly    Same day treatment     I will call the clinic first, nurse line if after hours, urgent care and express care if needed                                  Past Medical History:    Past Medical History:   Diagnosis Date     AK (actinic keratosis) 4/14/2011     FHX COLON CANCER - SCOPE Q 5Y 5/17/2007     GERD (gastroesophageal reflux disease) 5/20/2011     Herpes simplex of eye 5/20/2011     Hyperlipidemia LDL goal <130 9/29/2008     Moles 4/14/2011     NO ACTIVE PROBLEMS      Past Surgical History:    Past Surgical History:   Procedure Laterality Date     C APPENDECTOMY  1960     COLONOSCOPY  8/10/2012    Procedure: COLONOSCOPY;  Colonoscopy;  Surgeon: Carlos Alatorre MD;  Location: [facility]     COLONOSCOPY N/A 1/4/2017    Procedure: COLONOSCOPY;  Surgeon: Stepan Samayoa MD;  Location: [facility]     EYE SURGERY  2017     HYSTERECTOMY, PAP NO LONGER INDICATED  2000    Ovaries left     TONSILLECTOMY  1958     Family History:    Family History   Problem Relation Age of Onset     Cancer - colorectal Father      Cancer Daughter      Breast Cancer Daughter   "    Congenital Anomalies Daughter         trisomy 18     Diabetes No family hx of      Hypertension No family hx of      Cerebrovascular Disease No family hx of      Prostate Cancer No family hx of      Social History:  Marital Status:   [2]  Social History     Tobacco Use     Smoking status: Never Smoker     Smokeless tobacco: Never Used   Substance Use Topics     Alcohol use: Yes     Comment: Social-- 6/wk     Drug use: No      Medications:    ketoconazole (NIZORAL) 2 % external shampoo  losartan (COZAAR) 50 MG tablet  pantoprazole (PROTONIX) 40 MG EC tablet  psyllium (METAMUCIL/KONSYL) 58.6 % powder  VALTREX 1 g tablet  VALTREX 1 G tablet      Review of Systems   All other systems reviewed and are negative.      PE   BP: 128/81  Pulse: 103  Temp: 98.1  F (36.7  C)  Resp: 18  Height: 162.6 cm (5' 4\")  Weight: 62.6 kg (138 lb)  SpO2: 97 %  Physical Exam  Vitals signs reviewed.   Constitutional:       General: She is not in acute distress.     Appearance: She is well-developed.   HENT:      Head: Normocephalic and atraumatic.      Mouth/Throat:      Comments: Dry.  Eyes:      Conjunctiva/sclera: Conjunctivae normal.   Neck:      Musculoskeletal: Normal range of motion.   Cardiovascular:      Rate and Rhythm: Normal rate and regular rhythm.   Pulmonary:      Effort: Pulmonary effort is normal.   Abdominal:      Palpations: Abdomen is soft.      Tenderness: There is abdominal tenderness.      Comments: The patient has suprapubic and left lower quadrant tenderness.  Local guarding with deep palpation.  Otherwise soft and not tender and without distention.   Musculoskeletal: Normal range of motion.   Skin:     General: Skin is warm and dry.   Neurological:      Mental Status: She is alert and oriented to person, place, and time.   Psychiatric:         Behavior: Behavior normal.         ED COURSE and MDM   1443.  The patient has recurrent symptoms that are very similar to her prior episode of diverticulitis.  She " has a pulse of 100 and she feels dehydrated.  Whenever she takes something by mouth she will have increased diarrhea.  Fluid bolus ordered.  Lab values pending.  Ampicillin/sulbactam ordered.  Augmentin will be given for home.  No CT scan at this time.  Presumed diagnosis of diverticulitis.    LABS  Labs Ordered and Resulted from Time of ED Arrival Up to the Time of Departure from the ED   BASIC METABOLIC PANEL - Abnormal; Notable for the following components:       Result Value    Glucose 111 (*)     All other components within normal limits   CBC WITH PLATELETS DIFFERENTIAL       IMAGING  Images reviewed by me.  Radiology report also reviewed.  No orders to display       Procedures    Medications   lactated ringers BOLUS 1,000 mL (1,000 mLs Intravenous New Bag 3/1/20 5925)   ketorolac (TORADOL) injection 15 mg (15 mg Intravenous Given 3/1/20 1504)   ampicillin-sulbactam (UNASYN) 3 g vial to attach to  mL bag (3 g Intravenous Started 3/1/20 1506)         IMPRESSION       ICD-10-CM    1. Diverticulitis of colon K57.32             Medication List      There are no discharge medications for this visit.                       Mitchell Womack MD  03/01/20 7780

## 2020-03-01 NOTE — TELEPHONE ENCOUNTER
Karen has had diarrhea the last couple of days since early Fri morning.    Today she woke very early this morning with abdominal cramps and bloody, stringy diarrhea.    She's had about 9-10 episodes of diarrhea in the past 6 hours.    She recently traveled to [place] and returned about 1 week ago.    Per protocol, ER advised.    Amy Patricia RN  [facility]      Reason for Disposition    [1] Blood in the stool AND [2] moderate or large amount of blood    Additional Information    Negative: Shock suspected (e.g., cold/pale/clammy skin, too weak to stand, low BP, rapid pulse)    Negative: Difficult to awaken or acting confused (e.g., disoriented, slurred speech)    Negative: Sounds like a life-threatening emergency to the triager    Negative: [1] SEVERE abdominal pain (e.g., excruciating) AND [2] present > 1 hour    Negative: [1] SEVERE abdominal pain AND [2] age > 60    Protocols used: DIARRHEA-A-

## 2020-03-02 ENCOUNTER — TELEPHONE (OUTPATIENT)
Dept: FAMILY MEDICINE | Facility: CLINIC | Age: 70
End: 2020-03-02

## 2020-03-02 DIAGNOSIS — K57.32 DIVERTICULITIS OF COLON: Primary | ICD-10-CM

## 2020-03-02 RX ORDER — HYDROCODONE BITARTRATE AND ACETAMINOPHEN 5; 325 MG/1; MG/1
1 TABLET ORAL EVERY 6 HOURS PRN
Qty: 7 TABLET | Refills: 0 | Status: SHIPPED | OUTPATIENT
Start: 2020-03-02 | End: 2021-05-25

## 2020-03-02 NOTE — TELEPHONE ENCOUNTER
Patient was in the ER and has questions about how she is feeling and medications.    Stewart Schilling [facility]

## 2020-03-02 NOTE — TELEPHONE ENCOUNTER
"    Rturned call to pt,   bossman requesting a small number of pain meds for her diverticulitis   Was seen in Ed yesterday given antibiotic but no pain meds,  Last flair 12 2019 ED    ED/Discharge Protocol    \"Hi, my name is Rissa, a registered nurse, and I am calling on behalf of Dr. Sr 's office at [facility].  I am calling to follow up and see how things are going for you after your recent visit.\"    \"I see that you were in the (ER/UC/IP) on  3/1/2020      ICD-10-CM     1. Diverticulitis of colon         Dehydrated       How are you doing now that you are home?\" no real improvement pain ful cramping      Is patient experiencing symptoms that may require a hospital visit?                                  Not at this time       Discharge Instruction    Rest , FLUIDS,  [place] diet   Take antibiotic ordered       Medications    amoxicillin-clavulanate (AUGMENTIN) 875-125 MG tablet 20 tablet 0 3/1/2020 3/11/2020 --   Sig - Route: Take 1 tablet by mouth 2 times daily for 10 days - Oral   Sent to pharmacy as: amoxicillin-clavulanate (AUGMENTIN) 875-125 MG tablet       Call Summary      Pt stable ha ing lots of abd cramping and pain, requesting  Small amount of pain med to help  F/u appt made for this Friday   To call or seek ED if need prior  NOVA Gomez  RN/[facility]        "

## 2020-03-06 ENCOUNTER — OFFICE VISIT (OUTPATIENT)
Dept: FAMILY MEDICINE | Facility: CLINIC | Age: 70
End: 2020-03-06
Payer: COMMERCIAL

## 2020-03-06 ENCOUNTER — TELEPHONE (OUTPATIENT)
Dept: FAMILY MEDICINE | Facility: CLINIC | Age: 70
End: 2020-03-06

## 2020-03-06 VITALS
HEART RATE: 68 BPM | DIASTOLIC BLOOD PRESSURE: 66 MMHG | WEIGHT: 140.2 LBS | TEMPERATURE: 97.1 F | RESPIRATION RATE: 14 BRPM | SYSTOLIC BLOOD PRESSURE: 114 MMHG | BODY MASS INDEX: 24.07 KG/M2

## 2020-03-06 DIAGNOSIS — B00.1 RECURRENT COLD SORES: ICD-10-CM

## 2020-03-06 DIAGNOSIS — B00.50 HERPES SIMPLEX OF EYE: ICD-10-CM

## 2020-03-06 DIAGNOSIS — K57.30 DIVERTICULOSIS OF LARGE INTESTINE WITHOUT HEMORRHAGE: Primary | ICD-10-CM

## 2020-03-06 PROCEDURE — 99213 OFFICE O/P EST LOW 20 MIN: CPT | Performed by: NURSE PRACTITIONER

## 2020-03-06 RX ORDER — VALACYCLOVIR HCL 1000 MG
2000 TABLET ORAL 2 TIMES DAILY
Qty: 60 TABLET | Refills: 3 | Status: SHIPPED | OUTPATIENT
Start: 2020-03-06 | End: 2021-01-15

## 2020-03-06 ASSESSMENT — PAIN SCALES - GENERAL: PAINLEVEL: NO PAIN (0)

## 2020-03-06 NOTE — PATIENT INSTRUCTIONS
Your bowel sounds are getting back to normal.   There is still a little inflammation in the  LLQ .  Avoid spicy foods.      Remember to take Metamucil with you on vacation     You will need to make a lab only appointment,  [phone].   Come in fasting

## 2020-03-06 NOTE — PROGRESS NOTES
Subjective     Karen Hernandez is a 69 year old female who presents to clinic today for the following health issues:    HPI     ED/UC Followup:    Facility:  [facility]  Date of visit: 03/01/2020  Reason for visit: LLQ and suprapubic abdominal pain; Diverticulitis of colon  Current Status: Has been feeling much better, abdominal cramping very seldom at this point. Only ended up needing 2 of the narcotic pain pills that she was given.         Patient Active Problem List   Diagnosis     FHX COLON CANCER - SCOPE Q 5Y     Hyperlipidemia LDL goal <130     AK (actinic keratosis)     Melanocytic nevus     Recurrent cold sores     Herpes simplex of eye     24 hour contact handout given     Loss of height     Dermatitis     Gastroesophageal reflux disease with esophagitis     Essential hypertension with goal blood pressure less than 140/90     Osteopenia     Elevated glucose     Keratosis, seborrheic     Advanced directives, counseling/discussion     Past Surgical History:   Procedure Laterality Date     C APPENDECTOMY  1960     COLONOSCOPY  8/10/2012    Procedure: COLONOSCOPY;  Colonoscopy;  Surgeon: Carlos Alatorre MD;  Location: [facility]     COLONOSCOPY N/A 1/4/2017    Procedure: COLONOSCOPY;  Surgeon: Stepan Samayoa MD;  Location: [facility]     EYE SURGERY  2017     HYSTERECTOMY, PAP NO LONGER INDICATED  2000    Ovaries left     TONSILLECTOMY  1958       Social History     Tobacco Use     Smoking status: Never Smoker     Smokeless tobacco: Never Used   Substance Use Topics     Alcohol use: Yes     Comment: Social-- 6/wk     Family History   Problem Relation Age of Onset     Cancer - colorectal Father      Cancer Daughter      Breast Cancer Daughter      Congenital Anomalies Daughter         trisomy 18     Diabetes No family hx of      Hypertension No family hx of      Cerebrovascular Disease No family hx of      Prostate Cancer No family hx of          Current Outpatient Medications   Medication Sig Dispense  Refill     amoxicillin-clavulanate (AUGMENTIN) 875-125 MG tablet Take 1 tablet by mouth 2 times daily for 10 days 20 tablet 0     HYDROcodone-acetaminophen (NORCO) 5-325 MG tablet Take 1 tablet by mouth every 6 hours as needed 7 tablet 0     ketoconazole (NIZORAL) 2 % external shampoo Apply to scalp, leave on for 5 minutes, rinse. Use 2-3 times weekly. 120 mL 11     losartan (COZAAR) 50 MG tablet TAKE 1 TAB BY MOUTH DAILY (Patient taking differently: Take 50 mg by mouth daily TAKE 1 TAB BY MOUTH DAILY) 90 tablet 3     pantoprazole (PROTONIX) 40 MG EC tablet Take 1 tablet (40 mg) by mouth daily Take 30-60 min before the first meal of the day 90 tablet 3     psyllium (METAMUCIL/KONSYL) 58.6 % powder Take 1 teaspoonful by mouth daily       VALTREX 1 g tablet Take 2 tablets (2,000 mg) by mouth 2 times daily Dispense as written 12 tablet 1     Allergies   Allergen Reactions     Nkda [No Known Drug Allergies]      BP Readings from Last 3 Encounters:   03/06/20 114/66   03/01/20 123/74   12/02/19 (!) 147/86    Wt Readings from Last 3 Encounters:   03/06/20 63.6 kg (140 lb 3.2 oz)   03/01/20 62.6 kg (138 lb)   12/02/19 66.7 kg (147 lb)                      Reviewed and updated as needed this visit by Provider  Meds  Problems         Review of Systems   ROS COMP: CONSTITUTIONAL: NEGATIVE for fever, chills, change in weight  ENT/MOUTH: NEGATIVE for ear, mouth and throat problems  RESP: NEGATIVE for significant cough or SOB  CV: NEGATIVE for chest pain, palpitations or peripheral edema  GI: NEGATIVE for nausea, abdominal pain, heartburn, or change in bowel habits and was in  [place] and did well. But was then not taking her Metamucil and then forget to restart her Metamucil . After getting home she started to have some diarrhea. Abdominal pain/cramping        Objective    /66 (BP Location: Left arm, Patient Position: Chair, Cuff Size: Adult Regular)   Pulse 68   Temp 97.1  F (36.2  C) (Tympanic)   Resp 14   Wt 63.6  kg (140 lb 3.2 oz)   LMP  (LMP Unknown)   BMI 24.07 kg/m    Body mass index is 24.07 kg/m .  Physical Exam   GENERAL: healthy, alert and no distress  HENT: ear canals and TM's normal, nose and mouth without ulcers or lesions  NECK: no adenopathy, no asymmetry, masses, or scars and thyroid normal to palpation  RESP: lungs clear to auscultation - no rales, rhonchi or wheezes  CV: regular rate and rhythm, normal S1 S2, no S3 or S4, no murmur, click or rub, no peripheral edema and peripheral pulses strong  ABDOMEN: tenderness RUQ and LLQ, no organomegaly or masses and bowel sounds normal  No guarding or rebound tenderness       Diagnostic Test Results:  Labs reviewed in Epic  none         ASSESSMENT/PLAN:      ICD-10-CM    1. Diverticulosis of large intestine without hemorrhage K57.30    2. Recurrent cold sores B00.1 VALTREX 1 g tablet   3. Herpes simplex of eye B00.50 VALTREX 1 g tablet       Patient Instructions   Your bowel sounds are getting back to normal.   There is still a little inflammation in the  LLQ .  Avoid spicy foods.      Remember to take Metamucil with you on vacation     You will need to make a lab only appointment,  [phone].   Come in fasting

## 2020-03-06 NOTE — NURSING NOTE
"Initial /66 (BP Location: Left arm, Patient Position: Chair, Cuff Size: Adult Regular)   Pulse 68   Temp 97.1  F (36.2  C) (Tympanic)   Resp 14   Wt 63.6 kg (140 lb 3.2 oz)   LMP  (LMP Unknown)   BMI 24.07 kg/m   Estimated body mass index is 24.07 kg/m  as calculated from the following:    Height as of 3/1/20: 1.626 m (5' 4\").    Weight as of this encounter: 63.6 kg (140 lb 3.2 oz). .    Elvira Joseph CMA (AAMA)    "

## 2020-03-09 RX ORDER — VALACYCLOVIR HCL 1000 MG
2000 TABLET ORAL 2 TIMES DAILY
Qty: 12 TABLET | Refills: 1 | Status: SHIPPED | OUTPATIENT
Start: 2020-03-09 | End: 2021-05-25

## 2020-03-15 ENCOUNTER — HEALTH MAINTENANCE LETTER (OUTPATIENT)
Age: 70
End: 2020-03-15

## 2020-04-14 DIAGNOSIS — B00.50 HERPES SIMPLEX OF EYE: ICD-10-CM

## 2020-04-14 DIAGNOSIS — B00.1 RECURRENT COLD SORES: ICD-10-CM

## 2020-04-14 RX ORDER — VALACYCLOVIR HYDROCHLORIDE 1 G/1
TABLET, FILM COATED ORAL
Qty: 12 TABLET | Refills: 1 | OUTPATIENT
Start: 2020-04-14

## 2020-06-01 DIAGNOSIS — I10 ESSENTIAL HYPERTENSION WITH GOAL BLOOD PRESSURE LESS THAN 140/90: ICD-10-CM

## 2020-06-01 DIAGNOSIS — R73.09 ELEVATED GLUCOSE: Primary | ICD-10-CM

## 2020-06-01 RX ORDER — LOSARTAN POTASSIUM 50 MG/1
TABLET ORAL
Qty: 90 TABLET | Refills: 0 | Status: SHIPPED | OUTPATIENT
Start: 2020-06-01 | End: 2020-08-26

## 2020-07-16 ENCOUNTER — VIRTUAL VISIT (OUTPATIENT)
Dept: FAMILY MEDICINE | Facility: CLINIC | Age: 70
End: 2020-07-16
Payer: COMMERCIAL

## 2020-07-16 DIAGNOSIS — K57.30 DIVERTICULOSIS OF LARGE INTESTINE WITHOUT HEMORRHAGE: Primary | ICD-10-CM

## 2020-07-16 PROCEDURE — 99213 OFFICE O/P EST LOW 20 MIN: CPT | Mod: 95 | Performed by: NURSE PRACTITIONER

## 2020-07-16 NOTE — PROGRESS NOTES
"Karen Hernandez is a 70 year old female who is being evaluated via a billable telephone visit.      The patient has been notified of following:     \"This telephone visit will be conducted via a call between you and your physician/provider. We have found that certain health care needs can be provided without the need for a physical exam.  This service lets us provide the care you need with a short phone conversation.  If a prescription is necessary we can send it directly to your pharmacy.  If lab work is needed we can place an order for that and you can then stop by our lab to have the test done at a later time.    Telephone visits are billed at different rates depending on your insurance coverage. During this emergency period, for some insurers they may be billed the same as an in-person visit.  Please reach out to your insurance provider with any questions.    If during the course of the call the physician/provider feels a telephone visit is not appropriate, you will not be charged for this service.\"    Patient has given verbal consent for Telephone visit?  Yes    What phone number would you like to be contacted at? [phone]    How would you like to obtain your AVS? Hannah    Subjective     Karen Hernandez is a 70 year old female who presents via phone visit today for the following health issues:    HPI    ABDOMINAL PAIN     Onset: 3 days    Description:   Character: Cramping  Location: Lower abdomen  Radiation: None    Intensity: moderate, waxing and waning    Progression of Symptoms:  Same    Accompanying Signs & Symptoms:  Fever/Chills?: no   Gas/Bloating: YES  Nausea: no   Vomitting: no   Diarrhea?: YES  Constipation:no   Dysuria or Hematuria: no    History:   Trauma: no   Previous similar pain: YES- seems very similar to the episode she had earlier this spring   Previous tests done: CT on 12/2/19, Last colonoscopy (screen) was 01/4/17 and was normal    Precipitating factors:   Does the pain change with:    "  Food: YES - food will start off the diarrhea and pain    BM: YES- will have some relief after having BM    Urination: no     Alleviating factors:  None    Therapies Tried and outcome: Metamucil     LMP:  not applicable           Patient Active Problem List   Diagnosis     FHX COLON CANCER - SCOPE Q 5Y     Hyperlipidemia LDL goal <130     AK (actinic keratosis)     Melanocytic nevus     Recurrent cold sores     Herpes simplex of eye     24 hour contact handout given     Loss of height     Dermatitis     Gastroesophageal reflux disease with esophagitis     Essential hypertension with goal blood pressure less than 140/90     Osteopenia     Elevated glucose     Keratosis, seborrheic     Advanced directives, counseling/discussion     Past Surgical History:   Procedure Laterality Date     C APPENDECTOMY  1960     COLONOSCOPY  8/10/2012    Procedure: COLONOSCOPY;  Colonoscopy;  Surgeon: Carlos Alatorre MD;  Location: [facility]     COLONOSCOPY N/A 1/4/2017    Procedure: COLONOSCOPY;  Surgeon: Stepan Samayoa MD;  Location: [facility]     EYE SURGERY  2017     HYSTERECTOMY, PAP NO LONGER INDICATED  2000    Ovaries left     TONSILLECTOMY  1958       Social History     Tobacco Use     Smoking status: Never Smoker     Smokeless tobacco: Never Used   Substance Use Topics     Alcohol use: Yes     Comment: Social-- 6/wk     Family History   Problem Relation Age of Onset     Cancer - colorectal Father      Cancer Daughter      Breast Cancer Daughter      Congenital Anomalies Daughter         trisomy 18     Diabetes No family hx of      Hypertension No family hx of      Cerebrovascular Disease No family hx of      Prostate Cancer No family hx of          Current Outpatient Medications   Medication Sig Dispense Refill     ketoconazole (NIZORAL) 2 % external shampoo Apply to scalp, leave on for 5 minutes, rinse. Use 2-3 times weekly. 120 mL 11     losartan (COZAAR) 50 MG tablet TAKE 1 TABLET BY MOUTH EVERY DAY 90 tablet 0      pantoprazole (PROTONIX) 40 MG EC tablet Take 1 tablet (40 mg) by mouth daily Take 30-60 min before the first meal of the day 90 tablet 3     psyllium (METAMUCIL/KONSYL) 58.6 % powder Take 1 teaspoonful by mouth daily       VALTREX 1 g tablet Take 2 tablets (2,000 mg) by mouth 2 times daily Dispense as written 12 tablet 1     VALTREX 1 g tablet Take 2 tablets (2,000 mg) by mouth 2 times daily Dispense as written 60 tablet 3     HYDROcodone-acetaminophen (NORCO) 5-325 MG tablet Take 1 tablet by mouth every 6 hours as needed (Patient not taking: Reported on 7/16/2020) 7 tablet 0     Allergies   Allergen Reactions     Nkda [No Known Drug Allergies]      BP Readings from Last 3 Encounters:   03/06/20 114/66   03/01/20 123/74   12/02/19 (!) 147/86    Wt Readings from Last 3 Encounters:   03/06/20 63.6 kg (140 lb 3.2 oz)   03/01/20 62.6 kg (138 lb)   12/02/19 66.7 kg (147 lb)                    Reviewed and updated as needed this visit by Provider         Review of Systems   CONSTITUTIONAL: NEGATIVE for fever, chills, change in weight  ENT/MOUTH: NEGATIVE for ear, mouth and throat problems  RESP: NEGATIVE for significant cough or SOB  CV: NEGATIVE for chest pain, palpitations or peripheral edema  GI: POSITIVE for abdominal pain RLQ and LLQ, diarrhea and poor appetite  Has had this before.  Is watching diet.   Is having diarrhea.  In the night , in the AM and after eating.    NO blood in the stool,  No foul odor        Objective   Reported vitals:  LMP  (LMP Unknown)    healthy, alert and no distress  PSYCH: Alert and oriented times 3; coherent speech, normal   rate and volume, able to articulate logical thoughts, able   to abstract reason, no tangential thoughts, no hallucinations   or delusions  Her affect is normal, pleasant and full  RESP: No cough, no audible wheezing, able to talk in full sentences  Remainder of exam unable to be completed due to telephone visits    Diagnostic Test Results:  Labs reviewed in Epic  Did  review her labs and abd CT and  Last colonoscopy            Assessment/Plan:    ASSESSMENT/PLAN:      ICD-10-CM    1. Diverticulosis of large intestine without hemorrhage  K57.30 amoxicillin-clavulanate (AUGMENTIN) 875-125 MG tablet     GASTROENTEROLOGY ADULT REF CONSULT ONLY       Patient Instructions      You did get an antibiotic prescribed for the probable diverticulitis   The medication is  Augmentin an antibiotic - it is usually prescribed to take twice per day but with diverticulitis it is suggested to take  3 times per day.   Take with food.  If you would be stomach cramps.  Worsening diarrhea drop to the twice per day dosing.      You do have a referral to  GI for consult since you are  Having repeat episodes with diverticulitis    Please be aware that coverage of these services is subject to the terms and limitations of your health insurance plan.  Call member services at your health plan with any benefit or coverage questions.       Please call the facility your provider referred you to schedule your appointment            Order Questions     Question  Answer  Comment    Reason for Consult  Diverticulitis     Preferred Location:  [facility]: [facility] - Various Locations     [facility]:  [facility]: [facility] [place]     Non-internal location selection reason:  Patient Preference/Choice     Patient to call to schedule appointment  Please call the facility your provider referred you to schedule your appointment     Class  External referral             Associated Diagnoses     Diverticulosis of large intestine without hemorrhage [K57.30]  - Primary                     No follow-ups on file.      Phone call duration:  9 minutes

## 2020-07-16 NOTE — TELEPHONE ENCOUNTER
Pharmacy called to confirm dosing on augmentin   Advised per written RX   BRIGID. Cheko Gomez RN/[facility] Atkinson

## 2020-07-16 NOTE — PATIENT INSTRUCTIONS
You did get an antibiotic prescribed for the probable diverticulitis   The medication is  Augmentin an antibiotic - it is usually prescribed to take twice per day but with diverticulitis it is suggested to take  3 times per day.   Take with food.  If you would be stomach cramps.  Worsening diarrhea drop to the twice per day dosing.      You do have a referral to  GI for consult since you are  Having repeat episodes with diverticulitis    Please be aware that coverage of these services is subject to the terms and limitations of your health insurance plan.  Call member services at your health plan with any benefit or coverage questions.       Please call the facility your provider referred you to schedule your appointment            Order Questions     Question  Answer  Comment    Reason for Consult  Diverticulitis     Preferred Location:  [facility]: [facility] - Various Locations     [facility]:  [facility]: [facility] - [place]     Non-internal location selection reason:  Patient Preference/Choice     Patient to call to schedule appointment  Please call the facility your provider referred you to schedule your appointment     Class  External referral             Associated Diagnoses     Diverticulosis of large intestine without hemorrhage [K57.30]  - Primary

## 2020-08-02 ENCOUNTER — VIRTUAL VISIT (OUTPATIENT)
Dept: FAMILY MEDICINE | Facility: OTHER | Age: 70
End: 2020-08-02
Payer: COMMERCIAL

## 2020-08-02 PROCEDURE — 99421 OL DIG E/M SVC 5-10 MIN: CPT | Performed by: NURSE PRACTITIONER

## 2020-08-02 NOTE — PROGRESS NOTES
"Date: 2020 14:13:59  Clinician: Marlnea Muir  Clinician NPI: [id number]  Patient: Karen Hernandez  Patient : 1950  Patient Address: [address] [place] [address], [place] [address]  Patient Phone: [phone]  Visit Protocol: URI  Patient Summary:  Karen is a 70 year old ( : 1950 ) female who initiated a Visit for COVID-19 (Coronavirus) evaluation and screening. When asked the question \"Please sign me up to receive news, health information and promotions from [employer].\", Karen responded \"No\".    Karen states her symptoms started today.   Her symptoms consist of tooth pain, diarrhea, myalgia, a sore throat, a cough, nasal congestion, rhinitis, malaise, ear pain, a headache, chills, and enlarged lymph nodes. Karen also feels feverish.   Symptom details     Nasal secretions: The color of her mucus is clear.    Cough: Karen coughs a few times an hour and her cough is not more bothersome at night. Phlegm does not come into her throat when she coughs. She believes her cough is caused by post-nasal drip.     Sore throat: Karen reports having moderate throat pain (4-6 on a 10 point pain scale), does not have exudate on her tonsils, and can swallow liquids. The lymph nodes in her neck are enlarged. A rash has not appeared on the skin since the sore throat started.     Temperature: Her current temperature is 99.2 degrees Fahrenheit.     Headache: She states the headache is mild (1-3 on a 10 point pain scale).     Tooth pain: The tooth pain is not caused by a cavity, recent dental work, or other mouth problems.      Karen denies having wheezing, nausea, ageusia, anosmia, facial pain or pressure, and vomiting. She also denies having recent facial or sinus surgery in the past 60 days, taking antibiotic medication in the past month, and having a sinus infection within the past year. She is not experiencing dyspnea.   Precipitating events  Within the past week, Karen has not been exposed to someone with strep throat. " She has not recently been exposed to someone with influenza. Karen has been in close contact with the following high risk individuals: adults 65 or older and children under the age of 5.   Pertinent COVID-19 (Coronavirus) information  In the past 14 days, Karen has not worked in a congregate living setting.   She does not work or volunteer as healthcare worker or a  and does not work or volunteer in a healthcare facility.   Karen also has not lived in a congregate living setting in the past 14 days. She does not live with a healthcare worker.   Karen has not had a close contact with a laboratory-confirmed COVID-19 patient within 14 days of symptom onset.   Pertinent medical history  Karen does not get yeast infections when she takes antibiotics.   Karen does not need a return to work/school note.   Weight: 140 lbs   Karen does not smoke or use smokeless tobacco.   Weight: 140 lbs    MEDICATIONS: pantoprazole oral, losartan-hydrochlorothiazide oral, ALLERGIES: NKDA  Clinician Response:  Dear Karen,   Your symptoms show that you may have coronavirus (COVID-19). This illness can cause fever, cough and trouble breathing. Many people get a mild case and get better on their own. Some people can get very sick.  What should I do?  We would like to test you for this virus.   1. Please call [phone] to schedule your visit. Explain that you were referred by [facility] to have a COVID-19 test. Be ready to share your [facility] visit ID number.  The following will serve as your written order for this COVID Test, ordered by me, for the indication of suspected COVID [Z20.828]: The test will be ordered in [employer], our electronic health record, after you are scheduled. It will show as ordered and authorized by Tanner Musa MD.  Order: COVID-19 (Coronavirus) PCR for SYMPTOMATIC testing from [facility].      2. When it's time for your COVID test:  Stay at least 6 feet away from others. (If someone will drive you to your test, stay  "in the backseat, as far away from the  as you can.)   Cover your mouth and nose with a mask, tissue or washcloth.  Go straight to the testing site. Don't make any stops on the way there or back.      3.Starting now: Stay home and away from others (self-isolate) until:   You've had no fever---and no medicine that reduces fever---for 3 full days (72 hours). And...   Your other symptoms have gotten better. For example, your cough or breathing has improved. And...   At least 10 days have passed since your symptoms started.       During this time, don't leave the house except for testing or medical care.   Stay in your own room, even for meals. Use your own bathroom if you can.   Stay away from others in your home. No hugging, kissing or shaking hands. No visitors.  Don't go to work, school or anywhere else.    Clean \"high touch\" surfaces often (doorknobs, counters, handles, etc.). Use a household cleaning spray or wipes. You'll find a full list of  on the EPA website: www.epa.gov/pesticide-registration/list-n-disinfectants-use-against-sars-cov-2.   Cover your mouth and nose with a mask, tissue or washcloth to avoid spreading germs.  Wash your hands and face often. Use soap and water.  Caregivers in these groups are at risk for severe illness due to COVID-19:  o People 65 years and older  o People who live in a nursing home or long-term care facility  o People with chronic disease (lung, heart, cancer, diabetes, kidney, liver, immunologic)  o People who have a weakened immune system, including those who:   Are in cancer treatment  Take medicine that weakens the immune system, such as corticosteroids  Had a bone marrow or organ transplant  Have an immune deficiency  Have poorly controlled HIV or AIDS  Are obese (body mass index of 40 or higher)  Smoke regularly   o Caregivers should wear gloves while washing dishes, handling laundry and cleaning bedrooms and bathrooms.  o Use caution when washing and drying " laundry: Don't shake dirty laundry, and use the warmest water setting that you can.  o For more tips, go to www.cdc.gov/coronavirus/2019-ncov/downloads/10Things.pdf.    How can I take care of myself?   Get lots of rest. Drink extra fluids (unless a doctor has told you not to).   Take Tylenol (acetaminophen) for fever or pain. If you have liver or kidney problems, ask your family doctor if it's okay to take Tylenol.   Adults can take either:    650 mg (two 325 mg pills) every 4 to 6 hours, or...   1,000 mg (two 500 mg pills) every 8 hours as needed.    Note: Don't take more than 3,000 mg in one day. Acetaminophen is found in many medicines (both prescribed and over-the-counter medicines). Read all labels to be sure you don't take too much.   For children, check the Tylenol bottle for the right dose. The dose is based on the child's age or weight.    If you have other health problems (like cancer, heart failure, an organ transplant or severe kidney disease): Call your specialty clinic if you don't feel better in the next 2 days.       Know when to call 911. Emergency warning signs include:    Trouble breathing or shortness of breath Pain or pressure in the chest that doesn't go away Feeling confused like you haven't felt before, or not being able to wake up Bluish-colored lips or face.  Where can I get more information?   [facility] -- About COVID-19: [email or web address]   CDC -- What to Do If You're Sick: www.cdc.gov/coronavirus/2019-ncov/about/steps-when-sick.html   CDC -- Ending Home Isolation: www.cdc.gov/coronavirus/2019-ncov/hcp/disposition-in-home-patients.html   CDC -- Caring for Someone: www.cdc.gov/coronavirus/2019-ncov/if-you-are-sick/care-for-someone.html   [facility] -- Interim Guidance for Hospital Discharge to Home: www.health.[facility].mn.us/diseases/coronavirus/hcp/hospdischarge.pdf   [facility] clinical trials (COVID-19 research studies):  clinicalaffairs.[facility]-clinical-trials    Below are the COVID-19 hotlines at the [place] Department of Health ([facility]). Interpreters are available.    For health questions: Call 419-274-8942 or 1-849.248.9248 (7 a.m. to 7 p.m.) For questions about schools and childcare: Call 953-657-0596 or 1-353.218.4607 (7 a.m. to 7 p.m.)    Diagnosis: Cough  Diagnosis ICD: R05

## 2020-08-03 DIAGNOSIS — Z20.822 ENCOUNTER FOR LABORATORY TESTING FOR COVID-19 VIRUS: Primary | ICD-10-CM

## 2020-08-03 LAB
SARS-COV-2 RNA SPEC QL NAA+PROBE: NOT DETECTED
SPECIMEN SOURCE: NORMAL

## 2020-08-03 PROCEDURE — U0003 INFECTIOUS AGENT DETECTION BY NUCLEIC ACID (DNA OR RNA); SEVERE ACUTE RESPIRATORY SYNDROME CORONAVIRUS 2 (SARS-COV-2) (CORONAVIRUS DISEASE [COVID-19]), AMPLIFIED PROBE TECHNIQUE, MAKING USE OF HIGH THROUGHPUT TECHNOLOGIES AS DESCRIBED BY CMS-2020-01-R: HCPCS | Performed by: FAMILY MEDICINE

## 2020-08-06 ENCOUNTER — TELEPHONE (OUTPATIENT)
Dept: FAMILY MEDICINE | Facility: CLINIC | Age: 70
End: 2020-08-06

## 2020-08-06 NOTE — TELEPHONE ENCOUNTER
Patient called today.    Patient was tested for COVID and results negative on Monday.    Patient had cold symptoms.    Patient's son testing for COVID and results positive on Tueaday.    They both live in the same houshold.    Son is currently quarantining themselves.    Patient needs to know steps to do; when she should get re-tested.    Please contact patient today at cell number on file or 's cell sumit okay too at [phone].    Thank you.    Central Scheduling  Grazyna ORTIZ

## 2020-08-07 NOTE — TELEPHONE ENCOUNTER
Pt calling again with same questions, hoping to speak with Sania for direction. Routing also to RN pool.    Thank you,    Sandy Gabriel, [place]

## 2020-08-07 NOTE — TELEPHONE ENCOUNTER
Patient called and would like a return phone call.    Mony Goodman [facility]

## 2020-08-07 NOTE — TELEPHONE ENCOUNTER
Left detailed message on voice identified answering machine for patient to call back.  Patient informed Sania is in clinic seeing face to face patients today  Patient should quarantine herself (14 days)  Call [facility] at [phone] or go to [email or web address] for further direction regarding retesting.    Mony Naik RN

## 2020-08-08 NOTE — TELEPHONE ENCOUNTER
Talked with pt during the day ,  She is doing well,  Will quarantine for 14 days.   [place].    Son is feeling better

## 2020-08-09 ENCOUNTER — VIRTUAL VISIT (OUTPATIENT)
Dept: FAMILY MEDICINE | Facility: OTHER | Age: 70
End: 2020-08-09
Payer: COMMERCIAL

## 2020-08-09 PROCEDURE — 99421 OL DIG E/M SVC 5-10 MIN: CPT | Performed by: FAMILY MEDICINE

## 2020-08-09 NOTE — PROGRESS NOTES
"Date: 2020 12:02:02  Clinician: Fidelina Xiong  Clinician NPI: [id number]  Patient: Karen Hernandez  Patient : 1950  Patient Address: [address] [place] [address], [place] [address]  Patient Phone: [phone]  Visit Protocol: URI  Patient Summary:  Karen is a 70 year old ( : 1950 ) female who initiated a Visit for COVID-19 (Coronavirus) evaluation and screening. When asked the question \"Please sign me up to receive news, health information and promotions from [employer].\", Karen responded \"No\".    Karen states her symptoms started gradually 1 month or more ago.   Her symptoms consist of a cough and nasal congestion.   Symptom details     Nasal secretions: The color of her mucus is clear.    Cough: Karen coughs a few times an hour and her cough is more bothersome at night. Phlegm does not come into her throat when she coughs. She believes her cough is caused by post-nasal drip.      Karen denies having ear pain, headache, chills, enlarged lymph nodes, nausea, sore throat, teeth pain, ageusia, diarrhea, vomiting, rhinitis, myalgias, anosmia, facial pain or pressure, fever, wheezing, and malaise. She also denies taking antibiotic medication in the past month, double sickening (worsening symptoms after initial improvement), and having recent facial or sinus surgery in the past 60 days. She is not experiencing dyspnea.   Precipitating events  She has not recently been exposed to someone with influenza. Karen has not been in close contact with any high risk individuals.   Pertinent COVID-19 (Coronavirus) information  In the past 14 days, Karen has not worked in a congregate living setting.   She does not work or volunteer as healthcare worker or a  and does not work or volunteer in a healthcare facility.   Karen also has not lived in a congregate living setting in the past 14 days. She does not live with a healthcare worker.   Karen has not had a close contact with a laboratory-confirmed COVID-19 " "patient within 14 days of symptom onset.   Since December 2019, Karen and has not had upper respiratory infection or influenza-like illness. Has not been diagnosed with lab-confirmed COVID-19 test   Pertinent medical history  Karen does not get yeast infections when she takes antibiotics.   Karen does not need a return to work/school note.   Weight: 140 lbs   Karen does not smoke or use smokeless tobacco.   Additional information as reported by the patient (free text): I had a covid19 test last Monday.  It was negative,  My son was tested on Tuesday and his was positive,  We were around him but not really close contact.  [place]  I have no new symptoms just lingering cough and runny nose from my cold.  Do I need to be retested?   Weight: 140 lbs    MEDICATIONS: pantoprazole oral, losartan-hydrochlorothiazide oral, ALLERGIES: NKDA  Clinician Response:  Dear Karen,   &nbsp;      Based on the details you've shared, it looks like you're requesting an antibody (serology) test to see if you've had COVID-19 in the past.  This is a blood test. We take a small sample of your blood, then test it for something called \"antibodies.\" Your body makes antibodies to fight infection.   If your blood has antibodies for COVID-19, it suggests you've been infected with the virus in the past. We don't yet know if this protects you from getting the virus again in the future.  The test finds antibodies in most people 10 days after they get sick. For some people, it takes longer than 10 days for antibodies to show up. Others may never show antibodies against COVID-19, especially if they have a weak immune system   At [facility], we offer this testing for people who want to be tested. To get set up for testing, please call [address] [place] [address] ([phone]) to speak to a nurse who can order the test. Tell them you were referred through an [facility] visit.   This test is not for people who have symptoms of COVID-19. " "Those who have symptoms should have a PCR test; this is done by a swab in the nose.  Where can I get more information?   [facility] -- About COVID-19: [email or web address]   CDC -- What to Do If You're Sick: www.cdc.gov/coronavirus/2019-ncov/about/steps-when-sick.html   CDC -- Ending Home Isolation: www.cdc.gov/coronavirus/2019-ncov/hcp/disposition-in-home-patients.html   [facility] -- Caring for Someone: www.cdc.gov/coronavirus/2019-ncov/if-you-are-sick/care-for-someone.html   [facility] -- Interim Guidance for Hospital Discharge to Home: www.health.[facility].mn./diseases/coronavirus/hcp/hospdischarge.pdf   [facility] clinical trials (COVID-19 research studies): clinicalaffairs.[facility]-clinical-trials    Below are the COVID-19 hotlines at the [place] Department of Health ([facility]). Interpreters are available.       For health questions: Call 595-731-3371 or 1-745.178.8284 (7 a.m. to 7 p.m.)   For questions about schools and childcare: Call 719-375-6900 or 1-752.762.6964 (7 a.m. to 7 p.m.)        Based on the details you've shared, it looks like you're requesting an antibody (serology) test to see if you've had COVID-19 in the past.  This is a blood test. We take a small sample of your blood, then test it for something called \"antibodies.\" Your body makes antibodies to fight infection.   If your blood has antibodies for COVID-19, it suggests you've been infected with the virus in the past. We don't yet know if this protects you from getting the virus again in the future.  The test finds antibodies in most people 10 days after they get sick. For some people, it takes longer than 10 days for antibodies to show up. Others may never show antibodies against COVID-19, especially if they have a weak immune system   At [facility], we offer this testing for people who want to be tested. To get set up for testing, please call [phone] ([phone]) to speak to a nurse who can order the test. " Tell them you were referred through an [facility] visit.   This test is not for people who have symptoms of COVID-19. Those who have symptoms should have a PCR test; this is done by a swab in the nose.  Where can I get more information?   [facility] -- About COVID-19: [email or web address]   CDC -- What to Do If You're Sick: www.cdc.gov/coronavirus/2019-ncov/about/steps-when-sick.html   CDC -- Ending Home Isolation: www.cdc.gov/coronavirus/2019-ncov/hcp/disposition-in-home-patients.html   CDC -- Caring for Someone: www.cdc.gov/coronavirus/2019-ncov/if-you-are-sick/care-for-someone.html   [facility] -- Interim Guidance for Hospital Discharge to Home: www.[facility] access hospital.mn.us/diseases/coronavirus/hcp/hospdischarge.pdf   [facility] clinical trials (COVID-19 research studies): clinicalaffairs.[employer].[facility]/[employer]-clinical-trials    Below are the COVID-19 hotlines at the [place] Department of Health ([facility]). Interpreters are available.       For health questions: Call 261-802-3086 or 1-272.677.6422 (7 a.m. to 7 p.m.)   For questions about schools and childcare: Call 231-479-3367 or 1-647.919.2411 (7 a.m. to 7 p.m.)          Diagnosis: Cough  Diagnosis ICD: R05

## 2020-09-15 DIAGNOSIS — K21.00 GASTROESOPHAGEAL REFLUX DISEASE WITH ESOPHAGITIS: ICD-10-CM

## 2020-09-16 RX ORDER — PANTOPRAZOLE SODIUM 40 MG/1
TABLET, DELAYED RELEASE ORAL
Qty: 90 TABLET | Refills: 3 | Status: SHIPPED | OUTPATIENT
Start: 2020-09-16 | End: 2021-01-15

## 2020-09-16 NOTE — TELEPHONE ENCOUNTER
Prescription approved per G Refill Protocol or patient Primary care provider (PCP) Chart and last OV reviewed   NOVA Gomez RN/Stewart Atkinson

## 2020-11-09 ENCOUNTER — HOSPITAL ENCOUNTER (OUTPATIENT)
Dept: MAMMOGRAPHY | Facility: CLINIC | Age: 70
Discharge: HOME | End: 2020-11-09
Attending: NURSE PRACTITIONER | Admitting: NURSE PRACTITIONER
Payer: COMMERCIAL

## 2020-11-09 DIAGNOSIS — Z12.31 VISIT FOR SCREENING MAMMOGRAM: ICD-10-CM

## 2020-11-09 PROCEDURE — 77063 BREAST TOMOSYNTHESIS BI: CPT

## 2021-01-15 ENCOUNTER — VIRTUAL VISIT (OUTPATIENT)
Dept: FAMILY MEDICINE | Facility: CLINIC | Age: 71
End: 2021-01-15
Payer: COMMERCIAL

## 2021-01-15 DIAGNOSIS — K21.00 GASTROESOPHAGEAL REFLUX DISEASE WITH ESOPHAGITIS WITHOUT HEMORRHAGE: ICD-10-CM

## 2021-01-15 DIAGNOSIS — M79.645 PAIN OF FINGER OF LEFT HAND: Primary | ICD-10-CM

## 2021-01-15 DIAGNOSIS — I10 ESSENTIAL HYPERTENSION WITH GOAL BLOOD PRESSURE LESS THAN 140/90: ICD-10-CM

## 2021-01-15 PROCEDURE — 99214 OFFICE O/P EST MOD 30 MIN: CPT | Mod: 95 | Performed by: PHYSICIAN ASSISTANT

## 2021-01-15 RX ORDER — LOSARTAN POTASSIUM 50 MG/1
50 TABLET ORAL DAILY
Qty: 90 TABLET | Refills: 3 | Status: SHIPPED | OUTPATIENT
Start: 2021-01-15 | End: 2021-11-16

## 2021-01-15 RX ORDER — PANTOPRAZOLE SODIUM 40 MG/1
40 TABLET, DELAYED RELEASE ORAL DAILY
Qty: 90 TABLET | Refills: 3 | Status: SHIPPED | OUTPATIENT
Start: 2021-01-15 | End: 2021-11-23

## 2021-01-15 NOTE — PROGRESS NOTES
"Karen is a 70 year old who is being evaluated via a billable telephone visit.      What phone number would you like to be contacted at? [phone]  How would you like to obtain your AVS? Hannah    ASSESSMENT/PLAN:    (O70.678) Pain of finger of left hand  (primary encounter diagnosis)  Comment: initially sounded like trigger finger but on further description it sounds like it is the top, not the palmar side of her hand. Either way, it is something that once she stretches and moves it does improve so not debilitating as of yet. Discussed that should it start to get worse or impact her and her day to day activities she would like to do that I would have her see the hand speciailist  Plan: referral to hand if it gets worse or is bothering her more    (K21.00) Gastroesophageal reflux disease with esophagitis without hemorrhage  Comment: has been on this medication for quite some time. Discussed trial of slowly decreasing the dose if tolerated. Patient seemed hesitant and since it is our first \"meeting\" we discussed leaving it as is for now and discussing at a return visit later on  Plan: pantoprazole (PROTONIX) 40 MG EC tablet            (I10) Essential hypertension with goal blood pressure less than 140/90  Comment: doing well. Patient states her blood pressures most recently at the dental office have been WNL  Plan: losartan (COZAAR) 50 MG tablet            Delphine Aguilar PA-C      Subjective     Karen is a 70 year old who presents to clinic today for the following health issues    HPI      Hypertension Follow-up      Do you check your blood pressure regularly outside of the clinic? No     Are you following a low salt diet? Yes    Are your blood pressures ever more than 140 on the top number (systolic) OR more   than 90 on the bottom number (diastolic), for example 140/90? No      How many servings of fruits and vegetables do you eat daily?  2-3    On average, how many sweetened beverages do you drink each day " (Examples: soda, juice, sweet tea, etc.  Do NOT count diet or artificially sweetened beverages)?   Varies     How many days per week do you exercise enough to make your heart beat faster? 3 or less    How many minutes a day do you exercise enough to make your heart beat faster? 9 or less    How many days per week do you miss taking your medication? 0       Medication Followup of Protonix 40 mg ec tablet     Taking Medication as prescribed: yes    Side Effects:  None    Medication Helping Symptoms:  yes     -She would like to know about when the Covid vaccine will be available for her.     -She has been trouble with her middle finger on her left hand. Some pain and hurting when stretching it out.       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 Does not look swollen  No known injury    Review of Systems   Remainder of ROS obtained and found to be negative other than that which was documented above        Objective           Vitals:  No vitals were obtained today due to virtual visit.    Physical Exam   healthy, alert and no distress  PSYCH: Alert and oriented times 3; coherent speech, normal   rate and volume, able to articulate logical thoughts, able   to abstract reason, no tangential thoughts, no hallucinations   or delusions  Her affect is normal  RESP: No cough, no audible wheezing, able to talk in full sentences  Remainder of exam unable to be completed due to telephone visits        Phone call duration:  20 minutes

## 2021-01-18 ENCOUNTER — OFFICE VISIT (OUTPATIENT)
Dept: DERMATOLOGY | Facility: CLINIC | Age: 71
End: 2021-01-18
Payer: COMMERCIAL

## 2021-01-18 VITALS — DIASTOLIC BLOOD PRESSURE: 97 MMHG | HEART RATE: 82 BPM | SYSTOLIC BLOOD PRESSURE: 155 MMHG | OXYGEN SATURATION: 98 %

## 2021-01-18 DIAGNOSIS — L82.1 SEBORRHEIC KERATOSIS: ICD-10-CM

## 2021-01-18 DIAGNOSIS — D18.01 ANGIOMA OF SKIN: ICD-10-CM

## 2021-01-18 DIAGNOSIS — L81.4 LENTIGO: ICD-10-CM

## 2021-01-18 DIAGNOSIS — D22.9 NEVUS: ICD-10-CM

## 2021-01-18 DIAGNOSIS — L82.0 INFLAMED SEBORRHEIC KERATOSIS: Primary | ICD-10-CM

## 2021-01-18 PROCEDURE — 99213 OFFICE O/P EST LOW 20 MIN: CPT | Mod: 25 | Performed by: PHYSICIAN ASSISTANT

## 2021-01-18 PROCEDURE — 17111 DESTRUCTION B9 LESIONS 15/>: CPT | Performed by: PHYSICIAN ASSISTANT

## 2021-01-18 NOTE — PROGRESS NOTES
HPI:   Chief complaints: Karen Hernandez is a 70 year old female who presents for Full skin cancer screening to rule out skin cancer   Last Skin Exam: 2 years ago      1st Baseline: no  Personal HX of Skin Cancer: no   Personal HX of Malignant Melanoma: no   Family HX of Skin Cancer / Malignant Melanoma: no  Personal HX of Atypical Moles:   no  Risk factors: lots of sun exposure; was a  in the past; history of blistering sunburns in the past.   New / Changing lesions:yes irritated areas on the back, neck that get caught on clothing and bleed  Social History:   On review of systems, there are no further skin complaints, patient is feeling otherwise well.  See patient intake sheet.  ROS of the following were done and are negative: Constitutional, Eyes, Ears, Nose,   Mouth, Throat, Cardiovascular, Respiratory, GI, Genitourinary, Musculoskeletal,   Psychiatric, Endocrine, Allergic/Immunologic.    PHYSICAL EXAM:   BP (!) 155/97 (BP Location: Left arm)   Pulse 82   LMP  (LMP Unknown)   SpO2 98%   Skin exam performed as follows: Type 2 skin. Mood appropriate  Alert and Oriented X 3. Well developed, well nourished in no distress.  General appearance: Normal  Head including face: Normal  Eyes: conjunctiva and lids: Normal  Mouth: Lips, teeth, gums: Normal  Neck: Normal  Chest-breast/axillae: Normal  Back: Normal  Spleen and liver: Normal  Cardiovascular: Exam of peripheral vascular system by observation for swelling, varicosities, edema: Normal  Genitalia: groin, buttocks: Normal  Extremities: digits/nails (clubbing): Normal  Eccrine and Apocrine glands: Normal  Right upper extremity: Normal  Left upper extremity: Normal  Right lower extremity: Normal  Left lower extremity: Normal  Skin: Scalp and body hair: See below    Pt deferred exam of breasts, groin, buttocks: No    Other physical findings:  1. Multiple pigmented macules on extremities and trunk  2. Multiple pigmented macules on face, trunk and  extremities  3. Multiple vascular papules on trunk, arms and legs  4. Multiple scattered keratotic plaques  5. Inflamed keratotic papules on the back x 10, flank x 10, neck x 10       Except as noted above, no other signs of skin cancer or melanoma.     ASSESSMENT/PLAN:   Benign Full skin cancer screening today. . Patient with history of none  Advised on monthly self exams and 1 year  Patient Education: Appropriate brochures given.    1. Multiple benign appearing nevi on arms, legs and trunk. Discussed ABCDEs of melanoma and sunscreen.   2. Multiple lentigos on arms, legs and trunk. Advised benign, no treatment needed.  3. Multiple scattered angiomas. Advised benign, no treatment needed.   4. Seborrheic keratosis on arms, legs and trunk. Advised benign, no treatment needed.  5. Inflamed seborrheic keratosis on the back x 10, flank x 10, neck x 10. As physically tender cryosurgery performed. Advised on post op care.   6. Karen to follow up with Primary Care provider regarding elevated blood pressure.            Follow-up: yearly FSE/PRN sooner    1.) Patient was asked about new and changing moles. YES  2.) Patient received a complete physical skin examination: YES  3.) Patient was counseled to perform a monthly self skin examination: YES  Scribed By: Leona Cage MS, PAJOHANNY

## 2021-03-08 ENCOUNTER — IMMUNIZATION (OUTPATIENT)
Dept: NURSING | Facility: CLINIC | Age: 71
End: 2021-03-08
Payer: COMMERCIAL

## 2021-03-08 PROCEDURE — 0001A PR COVID VAC PFIZER DIL RECON 30 MCG/0.3 ML IM: CPT

## 2021-03-08 PROCEDURE — 91300 PR COVID VAC PFIZER DIL RECON 30 MCG/0.3 ML IM: CPT

## 2021-03-29 ENCOUNTER — IMMUNIZATION (OUTPATIENT)
Dept: NURSING | Facility: CLINIC | Age: 71
End: 2021-03-29
Attending: FAMILY MEDICINE
Payer: COMMERCIAL

## 2021-03-29 PROCEDURE — 0002A PR COVID VAC PFIZER DIL RECON 30 MCG/0.3 ML IM: CPT

## 2021-03-29 PROCEDURE — 91300 PR COVID VAC PFIZER DIL RECON 30 MCG/0.3 ML IM: CPT

## 2021-04-13 ENCOUNTER — TELEPHONE (OUTPATIENT)
Dept: FAMILY MEDICINE | Facility: CLINIC | Age: 71
End: 2021-04-13

## 2021-04-13 NOTE — TELEPHONE ENCOUNTER
Panel Management Review      Patient has the following on her problem list:     Hypertension   Last three blood pressure readings:  BP Readings from Last 3 Encounters:   01/18/21 (!) 155/97   03/06/20 114/66   03/01/20 123/74     Blood pressure: MONITOR    HTN Guidelines:  Less than 140/90      Composite cancer screening  Chart review shows that this patient is due/due soon for the following None  Summary:    Patient is due/failing the following:   BP CHECK    Action needed:   Patient needs nurse only appointment for a blood pressure check.    Type of outreach:    Sent Lee Silber message.    Questions for provider review:    None                                                                                                                                    Brannon Kerr CMA       Chart routed to self.

## 2021-05-09 ENCOUNTER — HEALTH MAINTENANCE LETTER (OUTPATIENT)
Age: 71
End: 2021-05-09

## 2021-05-17 DIAGNOSIS — B00.50 HERPES SIMPLEX OF EYE: ICD-10-CM

## 2021-05-17 DIAGNOSIS — B00.1 RECURRENT COLD SORES: ICD-10-CM

## 2021-05-19 RX ORDER — VALACYCLOVIR HYDROCHLORIDE 1 G/1
TABLET, FILM COATED ORAL
Qty: 12 TABLET | Refills: 1 | OUTPATIENT
Start: 2021-05-19

## 2021-05-19 NOTE — TELEPHONE ENCOUNTER
"Requested Prescriptions   Pending Prescriptions Disp Refills    valACYclovir (VALTREX) 1000 mg tablet [Pharmacy Med Name: VALACYCLOVIR HCL 1 GRAM TABLET] 12 tablet 1     Sig: TAKE 2 TABLETS (2,000 MG) BY MOUTH 2 TIMES DAILY DISPENSE AS WRITTEN       Antivirals for Herpes Protocol Failed - 5/17/2021 12:38 PM        Failed - Normal serum creatinine on file in past 12 months     Recent Labs   Lab Test 03/01/20  1501   CR 0.78       Ok to refill medication if creatinine is low          Passed - Patient is age 12 or older        Passed - Recent (12 mo) or future (30 days) visit within the authorizing provider's specialty     Patient has had an office visit with the authorizing provider or a provider within the authorizing providers department within the previous 12 mos or has a future within next 30 days. See \"Patient Info\" tab in inbasket, or \"Choose Columns\" in Meds & Orders section of the refill encounter.              Passed - Medication is active on med list           Routing refill request to provider for review/approval because:  Failed protocol.  Malika He BSN-RN  [facility]    "

## 2021-05-19 NOTE — TELEPHONE ENCOUNTER
Patient needs visit for refills.  Patient also needs to establish new PCP as hers has retired.  Please call and schedule.      Yadi Mackey PA-C on 5/19/2021 at 11:44 AM

## 2021-05-24 ENCOUNTER — ALLIED HEALTH/NURSE VISIT (OUTPATIENT)
Dept: FAMILY MEDICINE | Facility: CLINIC | Age: 71
End: 2021-05-24
Payer: COMMERCIAL

## 2021-05-24 VITALS — HEART RATE: 80 BPM | SYSTOLIC BLOOD PRESSURE: 154 MMHG | DIASTOLIC BLOOD PRESSURE: 84 MMHG

## 2021-05-24 DIAGNOSIS — Z13.220 LIPID SCREENING: Primary | ICD-10-CM

## 2021-05-24 PROCEDURE — 99207 PR NO CHARGE NURSE ONLY: CPT

## 2021-05-24 NOTE — PROGRESS NOTES
Patient here today requesting BP check.  She would also like to establish care with TONNY Aguilar.    See vitals.  Denies headache or vision changes.  Patient is taking Losartan every evening as prescribed.  Scheduled visit, fasting labs for 0700 tomorrow.    Patient would like to get refills of Valtrex, mouth cold sore starting.    Last Valtrex ordered 3/9/20, 12 tabs with 1 refill.  Patient would like to use [facility] on [address]. Ce Caratgena RN

## 2021-05-25 ENCOUNTER — OFFICE VISIT (OUTPATIENT)
Dept: FAMILY MEDICINE | Facility: CLINIC | Age: 71
End: 2021-05-25
Payer: COMMERCIAL

## 2021-05-25 VITALS
HEART RATE: 83 BPM | BODY MASS INDEX: 24.89 KG/M2 | TEMPERATURE: 97 F | DIASTOLIC BLOOD PRESSURE: 76 MMHG | OXYGEN SATURATION: 99 % | SYSTOLIC BLOOD PRESSURE: 128 MMHG | WEIGHT: 145 LBS

## 2021-05-25 DIAGNOSIS — Z00.00 ENCOUNTER FOR MEDICARE ANNUAL WELLNESS EXAM: Primary | ICD-10-CM

## 2021-05-25 DIAGNOSIS — E78.5 HYPERLIPIDEMIA LDL GOAL <130: ICD-10-CM

## 2021-05-25 DIAGNOSIS — I10 ESSENTIAL HYPERTENSION WITH GOAL BLOOD PRESSURE LESS THAN 140/90: ICD-10-CM

## 2021-05-25 DIAGNOSIS — R06.02 EXERTIONAL SHORTNESS OF BREATH: ICD-10-CM

## 2021-05-25 DIAGNOSIS — B00.1 RECURRENT COLD SORES: ICD-10-CM

## 2021-05-25 LAB
ANION GAP SERPL CALCULATED.3IONS-SCNC: 6 MMOL/L (ref 3–14)
BUN SERPL-MCNC: 16 MG/DL (ref 7–30)
CALCIUM SERPL-MCNC: 8.7 MG/DL (ref 8.5–10.1)
CHLORIDE SERPL-SCNC: 104 MMOL/L (ref 94–109)
CHOLEST SERPL-MCNC: 287 MG/DL
CO2 SERPL-SCNC: 26 MMOL/L (ref 20–32)
CREAT SERPL-MCNC: 0.82 MG/DL (ref 0.52–1.04)
GFR SERPL CREATININE-BSD FRML MDRD: 72 ML/MIN/{1.73_M2}
GLUCOSE SERPL-MCNC: 117 MG/DL (ref 70–99)
HDLC SERPL-MCNC: 69 MG/DL
LDLC SERPL CALC-MCNC: 196 MG/DL
NONHDLC SERPL-MCNC: 218 MG/DL
POTASSIUM SERPL-SCNC: 4.2 MMOL/L (ref 3.4–5.3)
SODIUM SERPL-SCNC: 136 MMOL/L (ref 133–144)
TRIGL SERPL-MCNC: 112 MG/DL

## 2021-05-25 PROCEDURE — G0438 PPPS, INITIAL VISIT: HCPCS | Performed by: PHYSICIAN ASSISTANT

## 2021-05-25 PROCEDURE — 99213 OFFICE O/P EST LOW 20 MIN: CPT | Mod: 25 | Performed by: PHYSICIAN ASSISTANT

## 2021-05-25 PROCEDURE — 36415 COLL VENOUS BLD VENIPUNCTURE: CPT | Performed by: PHYSICIAN ASSISTANT

## 2021-05-25 PROCEDURE — 80048 BASIC METABOLIC PNL TOTAL CA: CPT | Performed by: PHYSICIAN ASSISTANT

## 2021-05-25 PROCEDURE — 80061 LIPID PANEL: CPT | Performed by: PHYSICIAN ASSISTANT

## 2021-05-25 RX ORDER — VALACYCLOVIR HCL 1000 MG
2000 TABLET ORAL 2 TIMES DAILY
Qty: 60 TABLET | Refills: 1 | Status: SHIPPED | OUTPATIENT
Start: 2021-05-25 | End: 2021-05-27

## 2021-05-25 ASSESSMENT — PAIN SCALES - GENERAL: PAINLEVEL: NO PAIN (0)

## 2021-05-25 NOTE — PATIENT INSTRUCTIONS
To schedule the stress [facility] - [phone]        Patient Education   Personalized Prevention Plan  You are due for the preventive services outlined below.  Your care team is available to assist you in scheduling these services.  If you have already completed any of these items, please share that information with your care team to update in your medical record.  Health Maintenance Due   Topic Date Due     ANNUAL REVIEW OF HM ORDERS  Never done     Zoster (Shingles) Vaccine (2 of 3) 01/30/2014     Annual Wellness Visit  03/08/2015     FALL RISK ASSESSMENT  07/15/2020     PHQ-2  01/01/2021     Osteoporosis Screening  04/24/2021

## 2021-05-25 NOTE — PROGRESS NOTES
"  SUBJECTIVE:   Karen Hernandez is a 71 year old female who presents for Preventive Visit.      Patient has been advised of split billing requirements and indicates understanding: Yes  Are you in the first 12 months of your Medicare Part B coverage?  No    Physical Health:    In general, how would you rate your overall physical health? good    Outside of work, how many days during the week do you exercise? 2-3 days/week    Outside of work, approximately how many minutes a day do you exercise?30-45 minutes    If you drink alcohol do you typically have >3 drinks per day or >7 drinks per week? No    Do you usually eat at least 4 servings of fruit and vegetables a day, include whole grains & fiber and avoid regularly eating high fat or \"junk\" foods? Yes    Do you have any problems taking medications regularly?  No    Do you have any side effects from medications? none    Needs assistance for the following daily activities: no assistance needed    Which of the following safety concerns are present in your home?  none identified     Hearing impairment: No    In the past 6 months, have you been bothered by leaking of urine? no    Mental Health:    In general, how would you rate your overall mental or emotional health? good  PHQ-2 Score:      Do you feel safe in your environment? Yes    Have you ever done Advance Care Planning? (For example, a Health Directive, POLST, or a discussion with a medical provider or your loved ones about your wishes): No, advance care planning information given to patient to review.  Patient declined advance care planning discussion at this time.    Additional concerns to address?    Has noticed that when she is doing cleaning (cleaning floors, bathroom) that she will start to feel flushed and SOB. Will need to stop and rest and will be sweaty  Has not noticed this happening when climbing stairs and went on a walk the other day where she pushed herself and didn't notice it then  Prior to " COVID she was walking more often and going to [employer]. Admits she hasn't been doing any of that  No significant family history of heart disease    Fall risk:  Fallen 2 or more times in the past year?: No  Any fall with injury in the past year?: No    Cognitive Screenin) Repeat 3 items (Leader, Season, Table)    2) Clock draw: NORMAL  3) 3 item recall: Recalls 3 objects  Results: 3 items recalled: COGNITIVE IMPAIRMENT LESS LIKELY    Mini-CogTM Copyright ESTEVAN Kilgore. Licensed by the author for use in [facility]; reprinted with permission (cory@[facility]). All rights reserved.      Do you have sleep apnea, excessive snoring or daytime drowsiness?: no            Reviewed and updated as needed this visit by clinical staff  Tobacco  Allergies  Meds   Med Hx  Surg Hx  Fam Hx  Soc Hx        Reviewed and updated as needed this visit by Provider                Social History     Tobacco Use     Smoking status: Never Smoker     Smokeless tobacco: Never Used   Substance Use Topics     Alcohol use: Yes     Comment: Social-- 6/wk                           Current providers sharing in care for this patient include:   Patient Care Team:  Sania Sr APRN CNP (Inactive) as PCP - General (Family Practice)  Delphine Aguilar PA-C as Assigned PCP  Leona Cage PA-C as Assigned Surgical Provider    The following health maintenance items are reviewed in Epic and correct as of today:  Health Maintenance   Topic Date Due     ANNUAL REVIEW OF  ORDERS  Never done     ZOSTER IMMUNIZATION (2 of 3) 2014     FALL RISK ASSESSMENT  07/15/2020     DEXA  2021     COLORECTAL CANCER SCREENING  2022     MEDICARE ANNUAL WELLNESS VISIT  2022     ADVANCE CARE PLANNING  10/17/2022     MAMMO SCREENING  2022     DTAP/TDAP/TD IMMUNIZATION (4 - Td) 2023     LIPID  2024     HEPATITIS C SCREENING  Completed     PHQ-2  Completed     INFLUENZA VACCINE  Completed      "Pneumococcal Vaccine: 65+ Years  Completed     COVID-19 Vaccine  Completed     IPV IMMUNIZATION  Aged Out     MENINGITIS IMMUNIZATION  Aged Out     HEPATITIS B IMMUNIZATION  Aged Out     BP Readings from Last 3 Encounters:   05/25/21 128/76   05/24/21 (!) 154/84   01/18/21 (!) 155/97    Wt Readings from Last 3 Encounters:   05/25/21 65.8 kg (145 lb)   03/06/20 63.6 kg (140 lb 3.2 oz)   03/01/20 62.6 kg (138 lb)                  Mammogram Screening: Mammogram Screening: Recommended mammography every 1-2 years with patient discussion and risk factor consideration    ROS:  CONSTITUTIONAL: NEGATIVE for fever, chills, change in weight  INTEGUMENTARY/SKIN: NEGATIVE for worrisome rashes, moles or lesions  EYES: NEGATIVE for vision changes or irritation  ENT/MOUTH: NEGATIVE for ear, mouth and throat problems  RESP: NEGATIVE for significant cough or SOB  BREAST: NEGATIVE for masses, tenderness or discharge  CV: NEGATIVE for chest pain, palpitations or peripheral edema  GI: NEGATIVE for nausea, abdominal pain, heartburn, or change in bowel habits  : NEGATIVE for frequency, dysuria, or hematuria  MUSCULOSKELETAL: NEGATIVE for significant arthralgias or myalgia  NEURO: NEGATIVE for weakness, dizziness or paresthesias  ENDOCRINE: NEGATIVE for temperature intolerance, skin/hair changes  HEME: NEGATIVE for bleeding problems  PSYCHIATRIC: NEGATIVE for changes in mood or affect    OBJECTIVE:   /76   Pulse 83   Temp 97  F (36.1  C) (Tympanic)   Wt 65.8 kg (145 lb)   LMP  (LMP Unknown)   SpO2 99%   BMI 24.89 kg/m   Estimated body mass index is 24.89 kg/m  as calculated from the following:    Height as of 3/1/20: 1.626 m (5' 4\").    Weight as of this encounter: 65.8 kg (145 lb).  EXAM:   GENERAL: healthy, alert and no distress  EYES: Eyes grossly normal to inspection, PERRL and conjunctivae and sclerae normal  HENT: ear canals and TM's normal, nose and mouth without ulcers or lesions  NECK: no adenopathy, no asymmetry, " "masses, or scars and thyroid normal to palpation  RESP: lungs clear to auscultation - no rales, rhonchi or wheezes  CV: regular rate and rhythm, normal S1 S2, no S3 or S4, no murmur, click or rub, no peripheral edema and peripheral pulses strong  ABDOMEN: soft, nontender, no hepatosplenomegaly, no masses and bowel sounds normal  MS: no gross musculoskeletal defects noted, no edema  SKIN: no suspicious lesions or rashes  NEURO: Normal strength and tone, mentation intact and speech normal  PSYCH: mentation appears normal, affect normal/bright    Diagnostic Test Results:  pending    ASSESSMENT / PLAN:   (Z00.00) Encounter for Medicare annual wellness exam  (primary encounter diagnosis)  Comment:   Plan: Lipid panel reflex to direct LDL Fasting, Basic        metabolic panel  (Ca, Cl, CO2, Creat, Gluc, K,         Na, BUN)            (I10) Essential hypertension with goal blood pressure less than 140/90  Comment:   Plan: Basic metabolic panel  (Ca, Cl, CO2, Creat,         Gluc, K, Na, BUN)            (E78.5) Hyperlipidemia LDL goal <130  Comment:   Plan: Lipid panel reflex to direct LDL Fasting            (B00.1) Recurrent cold sores  Comment:   Plan: VALTREX 1 g tablet                    (R06.02) Exertional shortness of breath  Comment: exertional (not with all activities) flushing, SOB, sweating. New and occurring more frequently. Possible related to position (bending over) and working as she has not noticed it with walking or stair climbing but given it is new and concerned with what she is describing, will plan on ECHO stress test  Plan: Echocardiogram Exercise Stress              Patient has been advised of split billing requirements and indicates understanding: Yes    COUNSELING:  Reviewed preventive health counseling, as reflected in patient instructions    Estimated body mass index is 24.89 kg/m  as calculated from the following:    Height as of 3/1/20: 1.626 m (5' 4\").    Weight as of this encounter: 65.8 kg (145 " lb).        She reports that she has never smoked. She has never used smokeless tobacco.    Appropriate preventive services were discussed with this patient, including applicable screening as appropriate for cardiovascular disease, diabetes, osteopenia/osteoporosis, and glaucoma.  As appropriate for age/gender, discussed screening for colorectal cancer, prostate cancer, breast cancer, and cervical cancer. Checklist reviewing preventive services available has been given to the patient.    Reviewed patients plan of care and provided an AVS. The Basic Care Plan (routine screening as documented in Health Maintenance) for Karen meets the Care Plan requirement. This Care Plan has been established and reviewed with the Patient.    Counseling Resources:  ATP IV Guidelines  Pooled Cohorts Equation Calculator  Breast Cancer Risk Calculator  BRCA-Related Cancer Risk Assessment: FHS-7 Tool  FRAX Risk Assessment  ICSI Preventive Guidelines  Dietary Guidelines for Americans, 2010  USDA's MyPlate  ASA Prophylaxis  Lung CA Screening    DEMIAN Minor [facility]

## 2021-05-28 DIAGNOSIS — E78.5 HYPERLIPIDEMIA LDL GOAL <130: Primary | ICD-10-CM

## 2021-05-28 RX ORDER — ATORVASTATIN CALCIUM 20 MG/1
20 TABLET, FILM COATED ORAL DAILY
Qty: 90 TABLET | Refills: 1 | Status: SHIPPED | OUTPATIENT
Start: 2021-05-28 | End: 2021-11-16

## 2021-06-08 ENCOUNTER — HOSPITAL ENCOUNTER (OUTPATIENT)
Dept: CARDIOLOGY | Facility: CLINIC | Age: 71
Discharge: HOME | End: 2021-06-08
Attending: PHYSICIAN ASSISTANT | Admitting: PHYSICIAN ASSISTANT
Payer: COMMERCIAL

## 2021-06-08 DIAGNOSIS — R06.02 EXERTIONAL SHORTNESS OF BREATH: ICD-10-CM

## 2021-06-08 PROCEDURE — 255N000002 HC RX 255 OP 636: Performed by: PHYSICIAN ASSISTANT

## 2021-06-08 PROCEDURE — 93325 DOPPLER ECHO COLOR FLOW MAPG: CPT | Mod: 26 | Performed by: INTERNAL MEDICINE

## 2021-06-08 PROCEDURE — 93350 STRESS TTE ONLY: CPT | Mod: 26 | Performed by: INTERNAL MEDICINE

## 2021-06-08 PROCEDURE — 93018 CV STRESS TEST I&R ONLY: CPT | Performed by: INTERNAL MEDICINE

## 2021-06-08 PROCEDURE — 93321 DOPPLER ECHO F-UP/LMTD STD: CPT | Mod: 26 | Performed by: INTERNAL MEDICINE

## 2021-06-08 PROCEDURE — 93016 CV STRESS TEST SUPVJ ONLY: CPT | Performed by: INTERNAL MEDICINE

## 2021-06-08 PROCEDURE — 93017 CV STRESS TEST TRACING ONLY: CPT

## 2021-06-08 RX ADMIN — HUMAN ALBUMIN MICROSPHERES AND PERFLUTREN 2 ML: 10; .22 INJECTION, SOLUTION INTRAVENOUS at 08:34

## 2021-06-14 DIAGNOSIS — B00.1 RECURRENT COLD SORES: ICD-10-CM

## 2021-06-14 RX ORDER — VALACYCLOVIR HYDROCHLORIDE 1 G/1
2000 TABLET, FILM COATED ORAL 2 TIMES DAILY
Qty: 60 TABLET | Refills: 1 | OUTPATIENT
Start: 2021-06-14

## 2021-06-14 NOTE — TELEPHONE ENCOUNTER
She was given qty #60 with a refill on 5/27/21- I'm assuming this is a duplicate request?     Delphine

## 2021-06-14 NOTE — TELEPHONE ENCOUNTER
Routing refill request to provider for review/approval because:  Would like Provider to review and decide.  Thank you.  Torrey Tavarez RN

## 2021-07-24 DIAGNOSIS — B00.1 RECURRENT COLD SORES: ICD-10-CM

## 2021-07-27 RX ORDER — VALACYCLOVIR HYDROCHLORIDE 1 G/1
2000 TABLET, FILM COATED ORAL 2 TIMES DAILY
Qty: 60 TABLET | Refills: 1 | Status: SHIPPED | OUTPATIENT
Start: 2021-07-27 | End: 2021-08-13

## 2021-07-27 NOTE — TELEPHONE ENCOUNTER
Prescription approved per [facility] Refill Protocol.  Ce Cartagena RN

## 2021-08-10 DIAGNOSIS — B00.1 RECURRENT COLD SORES: ICD-10-CM

## 2021-08-12 NOTE — TELEPHONE ENCOUNTER
Routing refill request to provider for review/approval because:  PCP to determine refill    Samuel Hoskins RN

## 2021-08-13 RX ORDER — VALACYCLOVIR HYDROCHLORIDE 1 G/1
2000 TABLET, FILM COATED ORAL 2 TIMES DAILY
Qty: 60 TABLET | Refills: 1 | Status: SHIPPED | OUTPATIENT
Start: 2021-08-13 | End: 2021-08-30

## 2021-08-29 DIAGNOSIS — B00.1 RECURRENT COLD SORES: ICD-10-CM

## 2021-08-30 RX ORDER — VALACYCLOVIR HYDROCHLORIDE 1 G/1
2000 TABLET, FILM COATED ORAL 2 TIMES DAILY
Qty: 60 TABLET | Refills: 1 | Status: SHIPPED | OUTPATIENT
Start: 2021-08-30 | End: 2024-04-29

## 2021-08-31 DIAGNOSIS — B00.1 RECURRENT COLD SORES: ICD-10-CM

## 2021-08-31 RX ORDER — VALACYCLOVIR HYDROCHLORIDE 1 G/1
TABLET, FILM COATED ORAL
Qty: 360 TABLET | Refills: 1 | OUTPATIENT
Start: 2021-08-31

## 2021-09-01 NOTE — TELEPHONE ENCOUNTER
valACYclovir (VALTREX) 1000 mg tablet 60 tablet 1 8/30/2021  No   Sig - Route: TAKE 2 TABLETS (2,000 MG) BY MOUTH 2 TIMES DAILY DISPENSE AS WRITTEN - Oral   Sent to pharmacy as: valACYclovir HCl 1 GM Oral Tablet (VALTREX)   Class: E-Prescribe   Order: [id number]   E-Prescribing Status: Receipt confirmed by pharmacy (8/30/2021  8:48 PM CDT)   Printout Tracking    External Result Report   Medication Administration Instructions    Dispense as written   Pharmacy    [facility] [id number] [place] [address]

## 2021-10-24 ENCOUNTER — HEALTH MAINTENANCE LETTER (OUTPATIENT)
Age: 71
End: 2021-10-24

## 2021-11-13 ENCOUNTER — APPOINTMENT (OUTPATIENT)
Dept: RADIOLOGY | Facility: HOSPITAL | Age: 71
End: 2021-11-13
Attending: EMERGENCY MEDICINE
Payer: COMMERCIAL

## 2021-11-13 ENCOUNTER — APPOINTMENT (OUTPATIENT)
Dept: CT IMAGING | Facility: HOSPITAL | Age: 71
End: 2021-11-13
Attending: EMERGENCY MEDICINE
Payer: COMMERCIAL

## 2021-11-13 ENCOUNTER — HOSPITAL ENCOUNTER (OUTPATIENT)
Facility: HOSPITAL | Age: 71
Setting detail: OBSERVATION
Discharge: HOME | End: 2021-11-14
Attending: EMERGENCY MEDICINE | Admitting: HOSPITALIST
Payer: COMMERCIAL

## 2021-11-13 DIAGNOSIS — S32.009A LUMBAR TRANSVERSE PROCESS FRACTURE, CLOSED, INITIAL ENCOUNTER (H): ICD-10-CM
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
## 2021-11-13 LAB
ANION GAP SERPL CALCULATED.3IONS-SCNC: 9 MMOL/L (ref 5–18)
ATRIAL RATE - MUSE: 82 BPM
BASOPHILS # BLD AUTO: 0.1 10E3/UL (ref 0–0.2)
BASOPHILS NFR BLD AUTO: 1 %
BUN SERPL-MCNC: 16 MG/DL (ref 8–28)
CALCIUM SERPL-MCNC: 9.6 MG/DL (ref 8.5–10.5)
CHLORIDE BLD-SCNC: 109 MMOL/L (ref 98–107)
CO2 SERPL-SCNC: 24 MMOL/L (ref 22–31)
CREAT SERPL-MCNC: 0.81 MG/DL (ref 0.6–1.1)
DIASTOLIC BLOOD PRESSURE - MUSE: 83 MMHG
EOSINOPHIL # BLD AUTO: 0.2 10E3/UL (ref 0–0.7)
EOSINOPHIL NFR BLD AUTO: 2 %
ERYTHROCYTE [DISTWIDTH] IN BLOOD BY AUTOMATED COUNT: 12.3 % (ref 10–15)
GFR SERPL CREATININE-BSD FRML MDRD: 73 ML/MIN/1.73M2
GLUCOSE BLD-MCNC: 125 MG/DL (ref 70–125)
HCT VFR BLD AUTO: 38 % (ref 35–47)
HGB BLD-MCNC: 12.4 G/DL (ref 11.7–15.7)
IMM GRANULOCYTES # BLD: 0 10E3/UL
IMM GRANULOCYTES NFR BLD: 0 %
INTERPRETATION ECG - MUSE: NORMAL
LYMPHOCYTES # BLD AUTO: 1.8 10E3/UL (ref 0.8–5.3)
LYMPHOCYTES NFR BLD AUTO: 24 %
MCH RBC QN AUTO: 31 PG (ref 26.5–33)
MCHC RBC AUTO-ENTMCNC: 32.6 G/DL (ref 31.5–36.5)
MCV RBC AUTO: 95 FL (ref 78–100)
MONOCYTES # BLD AUTO: 0.5 10E3/UL (ref 0–1.3)
MONOCYTES NFR BLD AUTO: 7 %
NEUTROPHILS # BLD AUTO: 4.9 10E3/UL (ref 1.6–8.3)
NEUTROPHILS NFR BLD AUTO: 66 %
NRBC # BLD AUTO: 0 10E3/UL
NRBC BLD AUTO-RTO: 0 /100
P AXIS - MUSE: 54 DEGREES
PLATELET # BLD AUTO: 263 10E3/UL (ref 150–450)
POTASSIUM BLD-SCNC: 3.8 MMOL/L (ref 3.5–5)
PR INTERVAL - MUSE: 182 MS
QRS DURATION - MUSE: 78 MS
QT - MUSE: 388 MS
QTC - MUSE: 453 MS
R AXIS - MUSE: 14 DEGREES
RBC # BLD AUTO: 4 10E6/UL (ref 3.8–5.2)
SARS-COV-2 RNA RESP QL NAA+PROBE: NEGATIVE
SODIUM SERPL-SCNC: 142 MMOL/L (ref 136–145)
SYSTOLIC BLOOD PRESSURE - MUSE: 158 MMHG
T AXIS - MUSE: 27 DEGREES
VENTRICULAR RATE- MUSE: 82 BPM
WBC # BLD AUTO: 7.4 10E3/UL (ref 4–11)

## 2021-11-13 PROCEDURE — 87635 SARS-COV-2 COVID-19 AMP PRB: CPT | Performed by: EMERGENCY MEDICINE

## 2021-11-13 PROCEDURE — 250N000011 HC RX IP 250 OP 636: Performed by: EMERGENCY MEDICINE

## 2021-11-13 PROCEDURE — 80048 BASIC METABOLIC PNL TOTAL CA: CPT | Performed by: EMERGENCY MEDICINE

## 2021-11-13 PROCEDURE — 250N000013 HC RX MED GY IP 250 OP 250 PS 637: Performed by: EMERGENCY MEDICINE

## 2021-11-13 PROCEDURE — 96376 TX/PRO/DX INJ SAME DRUG ADON: CPT

## 2021-11-13 PROCEDURE — 93005 ELECTROCARDIOGRAM TRACING: CPT | Performed by: EMERGENCY MEDICINE

## 2021-11-13 PROCEDURE — 96374 THER/PROPH/DIAG INJ IV PUSH: CPT

## 2021-11-13 PROCEDURE — 99219 PR INITIAL OBSERVATION CARE,LEVEL II: CPT | Performed by: HOSPITALIST

## 2021-11-13 PROCEDURE — 99285 EMERGENCY DEPT VISIT HI MDM: CPT | Mod: 25

## 2021-11-13 PROCEDURE — C9803 HOPD COVID-19 SPEC COLLECT: HCPCS

## 2021-11-13 PROCEDURE — 72192 CT PELVIS W/O DYE: CPT

## 2021-11-13 PROCEDURE — 96372 THER/PROPH/DIAG INJ SC/IM: CPT | Mod: XU | Performed by: HOSPITALIST

## 2021-11-13 PROCEDURE — G0378 HOSPITAL OBSERVATION PER HR: HCPCS

## 2021-11-13 PROCEDURE — 72131 CT LUMBAR SPINE W/O DYE: CPT

## 2021-11-13 PROCEDURE — 250N000011 HC RX IP 250 OP 636: Performed by: HOSPITALIST

## 2021-11-13 PROCEDURE — 85025 COMPLETE CBC W/AUTO DIFF WBC: CPT | Performed by: EMERGENCY MEDICINE

## 2021-11-13 PROCEDURE — 250N000013 HC RX MED GY IP 250 OP 250 PS 637: Performed by: HOSPITALIST

## 2021-11-13 PROCEDURE — 36415 COLL VENOUS BLD VENIPUNCTURE: CPT | Performed by: EMERGENCY MEDICINE

## 2021-11-13 PROCEDURE — 73502 X-RAY EXAM HIP UNI 2-3 VIEWS: CPT

## 2021-11-13 RX ORDER — HEPARIN SODIUM 5000 [USP'U]/.5ML
5000 INJECTION, SOLUTION INTRAVENOUS; SUBCUTANEOUS EVERY 12 HOURS
Status: DISCONTINUED | OUTPATIENT
Start: 2021-11-13 | End: 2021-11-14 | Stop reason: HOSPADM

## 2021-11-13 RX ORDER — OXYCODONE AND ACETAMINOPHEN 5; 325 MG/1; MG/1
1 TABLET ORAL ONCE
Status: COMPLETED | OUTPATIENT
Start: 2021-11-13 | End: 2021-11-13

## 2021-11-13 RX ORDER — ONDANSETRON 2 MG/ML
4 INJECTION INTRAMUSCULAR; INTRAVENOUS EVERY 6 HOURS PRN
Status: DISCONTINUED | OUTPATIENT
Start: 2021-11-13 | End: 2021-11-14 | Stop reason: HOSPADM

## 2021-11-13 RX ORDER — METHOCARBAMOL 500 MG/1
500 TABLET, FILM COATED ORAL
Status: DISCONTINUED | OUTPATIENT
Start: 2021-11-13 | End: 2021-11-14

## 2021-11-13 RX ORDER — ONDANSETRON 4 MG/1
4 TABLET, ORALLY DISINTEGRATING ORAL EVERY 6 HOURS PRN
Status: DISCONTINUED | OUTPATIENT
Start: 2021-11-13 | End: 2021-11-14 | Stop reason: HOSPADM

## 2021-11-13 RX ORDER — POLYETHYLENE GLYCOL 3350 17 G/17G
17 POWDER, FOR SOLUTION ORAL DAILY PRN
Status: DISCONTINUED | OUTPATIENT
Start: 2021-11-13 | End: 2021-11-14 | Stop reason: HOSPADM

## 2021-11-13 RX ORDER — CALCIUM CARBONATE 500 MG/1
1000 TABLET, CHEWABLE ORAL 4 TIMES DAILY PRN
Status: DISCONTINUED | OUTPATIENT
Start: 2021-11-13 | End: 2021-11-14 | Stop reason: HOSPADM

## 2021-11-13 RX ORDER — FAMOTIDINE 20 MG/1
20 TABLET, FILM COATED ORAL 2 TIMES DAILY PRN
Status: DISCONTINUED | OUTPATIENT
Start: 2021-11-13 | End: 2021-11-14 | Stop reason: HOSPADM

## 2021-11-13 RX ORDER — LOSARTAN POTASSIUM 50 MG/1
50 TABLET ORAL DAILY
Status: DISCONTINUED | OUTPATIENT
Start: 2021-11-13 | End: 2021-11-14 | Stop reason: HOSPADM

## 2021-11-13 RX ORDER — ATORVASTATIN CALCIUM 10 MG/1
20 TABLET, FILM COATED ORAL DAILY
Status: DISCONTINUED | OUTPATIENT
Start: 2021-11-13 | End: 2021-11-14 | Stop reason: HOSPADM

## 2021-11-13 RX ORDER — LIDOCAINE 40 MG/G
CREAM TOPICAL
Status: DISCONTINUED | OUTPATIENT
Start: 2021-11-13 | End: 2021-11-14 | Stop reason: HOSPADM

## 2021-11-13 RX ORDER — ACETAMINOPHEN 325 MG/1
650 TABLET ORAL 4 TIMES DAILY
Status: DISCONTINUED | OUTPATIENT
Start: 2021-11-13 | End: 2021-11-14 | Stop reason: HOSPADM

## 2021-11-13 RX ORDER — LIDOCAINE 4 G/G
1-2 PATCH TOPICAL
Status: DISCONTINUED | OUTPATIENT
Start: 2021-11-13 | End: 2021-11-14 | Stop reason: HOSPADM

## 2021-11-13 RX ORDER — PANTOPRAZOLE SODIUM 40 MG/1
40 TABLET, DELAYED RELEASE ORAL DAILY
Status: DISCONTINUED | OUTPATIENT
Start: 2021-11-13 | End: 2021-11-14 | Stop reason: HOSPADM

## 2021-11-13 RX ORDER — DOCUSATE SODIUM 100 MG/1
100 CAPSULE, LIQUID FILLED ORAL 2 TIMES DAILY
Status: DISCONTINUED | OUTPATIENT
Start: 2021-11-13 | End: 2021-11-14 | Stop reason: HOSPADM

## 2021-11-13 RX ORDER — BISACODYL 10 MG
10 SUPPOSITORY, RECTAL RECTAL DAILY PRN
Status: DISCONTINUED | OUTPATIENT
Start: 2021-11-13 | End: 2021-11-14 | Stop reason: HOSPADM

## 2021-11-13 RX ADMIN — METHOCARBAMOL 500 MG: 500 TABLET, FILM COATED ORAL at 09:12

## 2021-11-13 RX ADMIN — METHOCARBAMOL 500 MG: 500 TABLET, FILM COATED ORAL at 14:53

## 2021-11-13 RX ADMIN — ACETAMINOPHEN 650 MG: 325 TABLET ORAL at 14:53

## 2021-11-13 RX ADMIN — DOCUSATE SODIUM 100 MG: 100 CAPSULE, LIQUID FILLED ORAL at 21:48

## 2021-11-13 RX ADMIN — HEPARIN SODIUM 5000 UNITS: 10000 INJECTION, SOLUTION INTRAVENOUS; SUBCUTANEOUS at 21:49

## 2021-11-13 RX ADMIN — ACETAMINOPHEN 650 MG: 325 TABLET ORAL at 09:12

## 2021-11-13 RX ADMIN — LOSARTAN POTASSIUM 50 MG: 50 TABLET, FILM COATED ORAL at 14:54

## 2021-11-13 RX ADMIN — FAMOTIDINE 20 MG: 20 TABLET, FILM COATED ORAL at 14:55

## 2021-11-13 RX ADMIN — HYDROMORPHONE HYDROCHLORIDE 0.2 MG: 1 INJECTION, SOLUTION INTRAMUSCULAR; INTRAVENOUS; SUBCUTANEOUS at 11:19

## 2021-11-13 RX ADMIN — ATORVASTATIN CALCIUM 20 MG: 10 TABLET, FILM COATED ORAL at 14:54

## 2021-11-13 RX ADMIN — HYDROMORPHONE HYDROCHLORIDE 0.5 MG: 1 INJECTION, SOLUTION INTRAMUSCULAR; INTRAVENOUS; SUBCUTANEOUS at 06:07

## 2021-11-13 RX ADMIN — OXYCODONE HYDROCHLORIDE AND ACETAMINOPHEN 1 TABLET: 5; 325 TABLET ORAL at 07:40

## 2021-11-13 RX ADMIN — METHOCARBAMOL 500 MG: 500 TABLET, FILM COATED ORAL at 18:49

## 2021-11-13 RX ADMIN — LIDOCAINE 2 PATCH: 246 PATCH TOPICAL at 09:50

## 2021-11-13 RX ADMIN — HYDROMORPHONE HYDROCHLORIDE 0.2 MG: 1 INJECTION, SOLUTION INTRAMUSCULAR; INTRAVENOUS; SUBCUTANEOUS at 20:05

## 2021-11-13 RX ADMIN — PANTOPRAZOLE SODIUM 40 MG: 20 TABLET, DELAYED RELEASE ORAL at 14:55

## 2021-11-13 RX ADMIN — OXYCODONE HYDROCHLORIDE 2.5 MG: 5 TABLET ORAL at 17:21

## 2021-11-13 RX ADMIN — DOCUSATE SODIUM 100 MG: 100 CAPSULE, LIQUID FILLED ORAL at 14:54

## 2021-11-13 RX ADMIN — ACETAMINOPHEN 650 MG: 325 TABLET ORAL at 21:48

## 2021-11-13 ASSESSMENT — ENCOUNTER SYMPTOMS
LIGHT-HEADEDNESS: 0
CONFUSION: 0
WOUND: 0
ABDOMINAL PAIN: 0
NECK PAIN: 0
DIFFICULTY URINATING: 0
ARTHRALGIAS: 1
BACK PAIN: 1
BRUISES/BLEEDS EASILY: 0
FEVER: 0
SHORTNESS OF BREATH: 0

## 2021-11-13 ASSESSMENT — ACTIVITIES OF DAILY LIVING (ADL): DEPENDENT_IADLS:: INDEPENDENT

## 2021-11-13 ASSESSMENT — MIFFLIN-ST. JEOR
SCORE: 1190.83
SCORE: 1135.04

## 2021-11-13 NOTE — PHARMACY-ADMISSION MEDICATION HISTORY
Pharmacy Note - Admission Medication History    Pertinent Provider Information: N/A     ______________________________________________________________________    Prior To Admission (PTA) med list completed and updated in EMR.       PTA Med List   Medication Sig Last Dose     atorvastatin (LIPITOR) 20 MG tablet Take 1 tablet (20 mg) by mouth daily 11/11/2021 at Unknown time     losartan (COZAAR) 50 MG tablet Take 1 tablet (50 mg) by mouth daily 11/11/2021 at Unknown time     pantoprazole (PROTONIX) 40 MG EC tablet Take 1 tablet (40 mg) by mouth daily 11/11/2021 at Unknown time     psyllium (METAMUCIL/KONSYL) 58.6 % powder Take 1 teaspoonful by mouth daily as needed  Past Month at prn     valACYclovir (VALTREX) 1000 mg tablet TAKE 2 TABLETS (2,000 MG) BY MOUTH 2 TIMES DAILY DISPENSE AS WRITTEN More than a month at prn       Information source(s): Patient[facility]  Method of interview communication: in-person    Summary of Changes to PTA Med List  New: N/A  Discontinued: N/A  Changed: N/A    Patient was asked about OTC/herbal products specifically.  PTA med list reflects this.    In the past week, patient estimated taking medication this percent of the time:  greater than 90%.    Allergies were reviewed, assessed, and updated with the patient.      Patient does not use any multi-dose medications prior to admission.    The information provided in this note is only as accurate as the sources available at the time of the update(s).    Thank you for the opportunity to participate in the care of this patient.    Yelena Resendiz  11/13/2021 9:18 AM

## 2021-11-13 NOTE — ED PROVIDER NOTES
EMERGENCY DEPARTMENT ENCOUNTER      NAME: Karen Hernandez  AGE: 71 year old female  YOB: 1950  MRN: [id number]  EVALUATION DATE & TIME: 11/13/2021  5:42 AM    PCP: Delphine Aguilar        Chief Complaint   Patient presents with     Fall         FINAL IMPRESSION:  1. Fall, initial encounter    2. Lumbar transverse process fracture, closed, initial encounter (H)          ED COURSE & MEDICAL DECISION MAKING:    Pertinent Labs & Imaging studies reviewed. (See chart for details)  71 year old female presents to the Emergency Department for evaluation of right low back pain after mechanical fall    ED Course as of 11/13/21 0728   Sat Nov 13, 2021   0713 Patient had mechanical fall today.  Has right buttock and right lower back tenderness.  Has pain with moving her hip.  Clinically hip fracture is less likely.  Plan for pain meds, imaging, screening labs.   0713 CT shows transverse process fracture L1-L2.  No fracture to the pelvis or hip.  Plan for admission for observation.        5:40 AM I met with the patient for the initial interview and physical examination. Discussed plan for treatment and workup in the ED.      728 Dr. Le accepted pt      At the conclusion of the encounter I discussed the results of all of the tests and the disposition. The questions were answered. The patient or family acknowledged understanding and was agreeable with the care plan.   PPE: Provider wore gloves, N95 mask, and goggles.            MEDICATIONS GIVEN IN THE EMERGENCY:  Medications   oxyCODONE-acetaminophen (PERCOCET) 5-325 MG per tablet 1 tablet (has no administration in time range)   HYDROmorphone (DILAUDID) injection 0.5 mg (0.5 mg Intravenous Given 11/13/21 0607)       NEW PRESCRIPTIONS STARTED AT TODAY'S ER VISIT  New Prescriptions    No medications on file            =================================================================    HPI    Patient information was obtained from: patient and EMS  personnel     Use of Intrepreter: N/A         Karen Hernandez is a 71 year old female with a pertinent history of osteopenia, hypertension, hyperlipidemia, who presents to this ED via EMS for evaluation of a fall.     Per EMS, patient fell yesterday (11/12) at ~2030 while walking downstairs and slipped. She landed on her right side and noted right hip pain immediately afterwards. She was evaluated by an EMS team afterwards and was recommended ice and heat, but she did not try to ambulate at the time. She took Tylenol and aspirin last night but none today. This morning, she tried to walk to the bathroom and was unable to secondary to severe right hip pain with bearing weight, prompting her to call EMS. She also endorses low back pain. She rates the pain at a 3/10 while lying still, but notes that she has intermittent shooting pains. She denies any pain radiating down the legs, buttock pain, arm pain, chest pain, neck pain, head pain, abdominal pain, and any other symptoms or complaints at this time.       REVIEW OF SYSTEMS   Review of Systems   Constitutional: Negative for fever.   Respiratory: Negative for shortness of breath.    Cardiovascular: Negative for chest pain.   Gastrointestinal: Negative for abdominal pain.   Genitourinary: Negative for difficulty urinating.   Musculoskeletal: Positive for arthralgias (right hip) and back pain. Negative for neck pain.        Negative for buttock pain, arm pain, head pain.    Skin: Negative for wound.   Allergic/Immunologic: Negative for immunocompromised state.   Neurological: Negative for light-headedness.   Hematological: Does not bruise/bleed easily.   Psychiatric/Behavioral: Negative for confusion.         PAST MEDICAL HISTORY:  Past Medical History:   Diagnosis Date     AK (actinic keratosis) 4/14/2011     FHX COLON CANCER - SCOPE Q 5Y 5/17/2007 2007 negative.      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  Recheck 3-6mo.       Moles 4/14/2011     NO ACTIVE PROBLEMS        PAST SURGICAL HISTORY:  Past Surgical History:   Procedure Laterality Date     C APPENDECTOMY  1960     COLONOSCOPY  8/10/2012    Procedure: COLONOSCOPY;  Colonoscopy;  Surgeon: Carlos Alatorre MD;  Location: [facility]     COLONOSCOPY N/A 1/4/2017    Procedure: COLONOSCOPY;  Surgeon: Stepan Samayoa MD;  Location: [facility]     EYE SURGERY  2017     HYSTERECTOMY, PAP NO LONGER INDICATED  2000    Ovaries left     TONSILLECTOMY  1958           CURRENT MEDICATIONS:    Patient's Medications   New Prescriptions    No medications on file   Previous Medications    ATORVASTATIN (LIPITOR) 20 MG TABLET    Take 1 tablet (20 mg) by mouth daily    LOSARTAN (COZAAR) 50 MG TABLET    Take 1 tablet (50 mg) by mouth daily    PANTOPRAZOLE (PROTONIX) 40 MG EC TABLET    Take 1 tablet (40 mg) by mouth daily    PSYLLIUM (METAMUCIL/KONSYL) 58.6 % POWDER    Take 1 teaspoonful by mouth daily    VALACYCLOVIR (VALTREX) 1000 MG TABLET    TAKE 2 TABLETS (2,000 MG) BY MOUTH 2 TIMES DAILY DISPENSE AS WRITTEN   Modified Medications    No medications on file   Discontinued Medications    No medications on file       ALLERGIES:  Allergies   Allergen Reactions     Nkda [No Known Drug Allergies]        FAMILY HISTORY:  Family History   Problem Relation Age of Onset     Cancer - colorectal Father      Cancer Daughter      Breast Cancer Daughter      Congenital Anomalies Daughter         trisomy 18     Diabetes No family hx of      Hypertension No family hx of      Cerebrovascular Disease No family hx of      Prostate Cancer No family hx of        SOCIAL HISTORY:   Social History     Socioeconomic History     Marital status:      Spouse name: Marko Hernandez     Number of children: 3     Years of education: Not on file     Highest education level: Not on file   Occupational History  "    Occupation:      Employer: [employer]   Tobacco Use     Smoking status: Never Smoker     Smokeless tobacco: Never Used   Substance and Sexual Activity     Alcohol use: Yes     Comment: Social-- 6/wk     Drug use: No     Sexual activity: Yes     Partners: Male     Birth control/protection: Surgical   Other Topics Concern     Parent/sibling w/ CABG, MI or angioplasty before 65F 55M? No   Social History Narrative     Not on file     Social Determinants of Health     Financial Resource Strain: Not on file   Food Insecurity: Not on file   Transportation Needs: Not on file   Physical Activity: Not on file   Stress: Not on file   Social Connections: Not on file   Intimate Partner Violence: Not on file   Housing Stability: Not on file       VITALS:  Patient Vitals for the past 24 hrs:   BP Temp Temp src Pulse Resp SpO2 Height Weight   11/13/21 0549 (!) 180/103 98.1  F (36.7  C) Oral 87 16 97 % 1.626 m (5' 4\") 63.5 kg (140 lb)       PHYSICAL EXAM      Vitals: BP (!) 180/103   Pulse 87   Temp 98.1  F (36.7  C) (Oral)   Resp 16   Ht 1.626 m (5' 4\")   Wt 63.5 kg (140 lb)   LMP  (LMP Unknown)   SpO2 97%   BMI 24.03 kg/m    General: Appears in no acute distress, awake, alert, interactive.  Eyes: Conjunctivae non-injected. Sclera anicteric.  HENT: Atraumatic.  Neck: Supple.  Respiratory/Chest: Respiration unlabored.  Heart: RRR  Abdomen: non distended, soft, nontender   Musculoskeletal:  No pain with bending the knee, ankle, or flexion of the hip. No tenderness to lateral trochanter. Right buttock pain. Pain with internal rotation of right leg to right buttock. Low back pain to palpation.   Skin: Normal color. No rash or diaphoresis.  Neurologic: Face symmetric, moves all extremities spontaneously. Speech clear. Light touch sensation intact in lower extremities. 2+ patella reflexes  Psychiatric: Oriented to person, place, and time. Affect appropriate.    LAB:  All pertinent labs reviewed and " interpreted.  Results for orders placed or performed during the hospital encounter of 11/13/21   Lumbar spine CT w/o contrast    Impression    IMPRESSION:  1.  There is transitional lumbosacral anatomy with near-complete lumbarization of S1 for the purposes of this dictation. Careful plain film correlation is essential prior to any planned surgical or procedural intervention.  2.  Using this numbering scheme, there are acute nondisplaced fractures through the right transverse process of L1 and L2.  3.  Grade 1 degenerative anterolisthesis of L5 on S1.   CT Pelvis Bone wo Contrast    Impression    IMPRESSION:  1.  Negative for evidence of fracture or dislocation in the bones about the pelvis.    2.  Mild degenerative arthritis both hips. This includes areas of subchondral cystic degenerative change in the acetabula bilaterally. On the right side, this is associated with a small amount of air in the areas of subchondral cystic degenerative change   as well as some air in the soft tissues anterior to the right hip. These findings are felt to be related to degenerative change of the right hip and have also been present previously although are more pronounced on the current study. If the patient's   symptoms persist, MRI could be considered in further evaluation.    3.  Hysterectomy.    4.  Sigmoid diverticulosis without evidence for diverticulitis.    5.  No fluid collections or evidence of hematoma.     XR Pelvis and Hip Right 2 Views    Impression    IMPRESSION: No acute fracture or dislocation.   Basic metabolic panel   Result Value Ref Range    Sodium 142 136 - 145 mmol/L    Potassium 3.8 3.5 - 5.0 mmol/L    Chloride 109 (H) 98 - 107 mmol/L    Carbon Dioxide (CO2) 24 22 - 31 mmol/L    Anion Gap 9 5 - 18 mmol/L    Urea Nitrogen 16 8 - 28 mg/dL    Creatinine 0.81 0.60 - 1.10 mg/dL    Calcium 9.6 8.5 - 10.5 mg/dL    Glucose 125 70 - 125 mg/dL    GFR Estimate 73 >60 mL/min/1.73m2   Asymptomatic COVID-19 Virus  (Coronavirus) by PCR Nasopharyngeal    Specimen: Nasopharyngeal; Swab   Result Value Ref Range    SARS CoV2 PCR Negative Negative   CBC with platelets and differential   Result Value Ref Range    WBC Count 7.4 4.0 - 11.0 10e3/uL    RBC Count 4.00 3.80 - 5.20 10e6/uL    Hemoglobin 12.4 11.7 - 15.7 g/dL    Hematocrit 38.0 35.0 - 47.0 %    MCV 95 78 - 100 fL    MCH 31.0 26.5 - 33.0 pg    MCHC 32.6 31.5 - 36.5 g/dL    RDW 12.3 10.0 - 15.0 %    Platelet Count 263 150 - 450 10e3/uL    % Neutrophils 66 %    % Lymphocytes 24 %    % Monocytes 7 %    % Eosinophils 2 %    % Basophils 1 %    % Immature Granulocytes 0 %    NRBCs per 100 WBC 0 <1 /100    Absolute Neutrophils 4.9 1.6 - 8.3 10e3/uL    Absolute Lymphocytes 1.8 0.8 - 5.3 10e3/uL    Absolute Monocytes 0.5 0.0 - 1.3 10e3/uL    Absolute Eosinophils 0.2 0.0 - 0.7 10e3/uL    Absolute Basophils 0.1 0.0 - 0.2 10e3/uL    Absolute Immature Granulocytes 0.0 <=0.0 10e3/uL    Absolute NRBCs 0.0 10e3/uL       RADIOLOGY:  Reviewed all pertinent imaging. Please see official radiology report.  XR Pelvis and Hip Right 2 Views   Final Result   IMPRESSION: No acute fracture or dislocation.      CT Pelvis Bone wo Contrast   Final Result   IMPRESSION:   1.  Negative for evidence of fracture or dislocation in the bones about the pelvis.      2.  Mild degenerative arthritis both hips. This includes areas of subchondral cystic degenerative change in the acetabula bilaterally. On the right side, this is associated with a small amount of air in the areas of subchondral cystic degenerative change    as well as some air in the soft tissues anterior to the right hip. These findings are felt to be related to degenerative change of the right hip and have also been present previously although are more pronounced on the current study. If the patient's    symptoms persist, MRI could be considered in further evaluation.      3.  Hysterectomy.      4.  Sigmoid diverticulosis without evidence for  diverticulitis.      5.  No fluid collections or evidence of hematoma.         Lumbar spine CT w/o contrast   Final Result   IMPRESSION:   1.  There is transitional lumbosacral anatomy with near-complete lumbarization of S1 for the purposes of this dictation. Careful plain film correlation is essential prior to any planned surgical or procedural intervention.   2.  Using this numbering scheme, there are acute nondisplaced fractures through the right transverse process of L1 and L2.   3.  Grade 1 degenerative anterolisthesis of L5 on S1.          [id number]  Sinus   LVH  No ST changes  Hr 82    QRS 78    axis 14    I, Camryn Dutton, am serving as a scribe to document services personally performed by Dr. Hassan based on my observation and the provider's statements to me. I, Alvino Hassan MD attest that Camryn Dutton is acting in a scribe capacity, has observed my performance of the services and has documented them in accordance with my direction.    Alvino Hassan M.D.  Emergency Medicine  [facility] EMERGENCY DEPARTMENT  [address] [place] [id number] [phone]  Dept: [phone]      Adelso Hassan MD  21 0716       Adelso Hassan MD  21 0720

## 2021-11-13 NOTE — CONSULTS
Care Management Initial Consult    General Information  Assessment completed with: Patient,Spouse or significant other, pt and spouse  Type of CM/SW Visit: Initial Assessment    Primary Care Provider verified and updated as needed: Yes   Readmission within the last 30 days: no previous admission in last 30 days   Return Category: Progression of disease  Reason for Consult: discharge planning  Advance Care Planning: Advance Care Planning Reviewed: no concerns identified  given HCD forms  General Information Comments: independent at baseline    Communication Assessment  Patient's communication style: spoken language (English or Bilingual)    Hearing Difficulty or Deaf: no   Wear Glasses or Blind: no    Cognitive  Cognitive/Neuro/Behavioral: WDL                      Living Environment:   People in home: spouse     Current living Arrangements: house      Able to return to prior arrangements: yes       Family/Social Support:  Care provided by: self  Provides care for: no one  Marital Status:             Description of Support System: Supportive    Support Assessment: Adequate family and caregiver support    Current Resources:   Patient receiving home care services: No     Community Resources: None  Equipment currently used at home: none  Supplies currently used at home: None    Employment/Financial:  Employment Status:          Financial Concerns: No concerns identified   Referral to Financial Counselor: No       Lifestyle & Psychosocial Needs:  Social Determinants of Health     Tobacco Use: Low Risk      Smoking Tobacco Use: Never Smoker     Smokeless Tobacco Use: Never Used   Alcohol Use: Not on file   Financial Resource Strain: Not on file   Food Insecurity: Not on file   Transportation Needs: Not on file   Physical Activity: Not on file   Stress: Not on file   Social Connections: Not on file   Intimate Partner Violence: Not on file   Depression: Not at risk     PHQ-2 Score: 0   Housing Stability: Not on  file       Functional Status:  Prior to admission patient needed assistance:   Dependent ADLs:: Independent  Dependent IADLs:: Independent  Assesssment of Functional Status: Not at baseline with ADL Functioning    Mental Health Status:  Mental Health Status: No Current Concerns       Chemical Dependency Status:                Values/Beliefs:  Spiritual, Cultural Beliefs, Buddhist Practices, Values that affect care:                 Additional Information:  RUBIA assessed, lives w/spouse, independent prior to fall, no svcs and given HCD Form, spouse to transport. Discussed GUZMAN.      Monica Irby RN

## 2021-11-13 NOTE — ED NOTES
"[facility] ED Handoff Report    ED Chief Complaint: Fall - right lower back pain    ED Diagnosis:  (W19.XXXA) Fall, initial encounter  Comment:   Plan:     (S32.009A) Lumbar transverse process fracture, closed, initial encounter (H)  Comment:   Plan:        PMH:    Past Medical History:   Diagnosis Date     AK (actinic keratosis) 4/14/2011     FHX COLON CANCER - SCOPE Q 5Y 5/17/2007 2007 negative.      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  Recheck 3-6mo.       Moles 4/14/2011     NO ACTIVE PROBLEMS         Code Status:  Full Code     Falls Risk: Yes Band: Applied    Current Living Situation/Residence: lives with a significant other, lives with their son or daughter and lives in a house     Elimination Status: Continent: Yes     Activity Level: 2 assist because of fracture - otherwise independent    Patients Preferred Language:  English     Needed: No    Vital Signs:  BP (!) 158/91   Pulse 70   Temp 98.1  F (36.7  C) (Oral)   Resp 14   Ht 1.626 m (5' 4\")   Wt 63.5 kg (140 lb)   LMP  (LMP Unknown)   SpO2 95%   BMI 24.03 kg/m       Cardiac Rhythm: Sinus Rhythm    Pain Score: 5/10    Is the Patient Confused:  No    Last Food or Drink: 11/13/21 at 1300    Focused Assessment:  Pt slid down two steps landing on her right hip. Localized pain to the right lower back. 9/10 during muscle spasms and 5/10 when just laying. Pt alert and oriented. Pt has a 20g right forearm. Pt is slightly hypertensive but otherwise vitals WNL.     Tests Performed: Done: Labs and Imaging    Treatments Provided:  See MAR     Family Dynamics/Concerns: No    Family Updated On Visitor Policy: Yes    Plan of Care Communicated to Family: Yes    Who Was Updated about Plan of Care:  - Marko Warner Checklist Done and Signed by Patient: Yes    Covid: asymptomatic , " negative    Additional Information:     RN: Aparna Jackson   11/13/2021 2:59 PM

## 2021-11-13 NOTE — H&P
[facility]    History and Physical - Hospitalist Service       Date of Admission:  2021  Karen Hernandez,  1950, MRN [id number]  PCP: Delphine Aguilar    Assessment & Plan      Karen Hernandez is a 71 year old female admitted on 2021. She has history of GERD, hyperlipidemia presents for evaluation of low back pain after a fall found to have L1/L2 transverse process fractures    #Acute fractures of L1/L2 transverse processes  Sustained in mechanical fall down 3 stairs  CT showing no other fractures. Near complete lumbarization of S1 was noted and she would need levels double checked prior to any interventional procedure in the future.  Neuro intact, no radiating pain. No neurosurg consultation needed at this time  Conservative management with ice, lidocaine patch, scheduled Tylenol, Robaxin, PT/OT  Oral oxycodone, IV dilaudid for breakthrough pain  Outpatient follow-up for existing diagnosis of osteopenia    #Hyperlipidemia, home statin  #Hypertension, home losartan  #GERD, home PPI  #Risky use of alcohol, consumes multiple alcoholic beverages most days. Would recommend decreasing use for long term health.       Diet: Regular Diet Adult    DVT Prophylaxis: Moderate risk. SQH    Galindo Catheter: Not present  Central Lines: None  Code Status: Full Code    Clinically Significant Risk Factors Present on Admission                   Disposition Plan   Expected discharge: 2021 recommended to prior living arrangement once Pain controlled, cleared by PT/OT.     The patient's care was discussed with the Patient and Patient's Family.    Natalie Le MD  [facility]  Text page via [employer] Paging/Directory      ______________________________________________________________________    Chief Complaint   Karen Hernandez is a 71 year old female who presents for low back pain after a fall    History of Present Illness   Patient reports  she was wearing some new socks that are quite slippery, went down her tile stairs from kitchen to living room (3 steps), slipped at the bottom and fell, thinks she may have landed hard on the edge of the step but is not sure.  Initially had some pain and could not stand, slithered over to the couch,  helped her up to the couch.  They called 911, she was assessed by EMS and had full range of motion of her hip and was able to bear weight, not brought in for further evaluation at that time.  She was able to move around the house and went to bed that night.  In the middle of the night, when she got up to use the bathroom, pain was acutely worsened.  She could not tolerate the pain and was needing assist of 2 to get up at that time, they had to call their son to come over and help.  They called 911 again and this time patient was brought in for evaluation and treatment.  Patient reports pain is localized in her right lower back.  It does not radiate.  No numbness of the leg.  Feels like a burning pain.  Prior to the fall she was in her usual state of health and denies any fever, chills, cough, shortness of breath, abdominal pain, diarrhea, constipation, urinary issues, swelling, wound.    Review of Systems    The 10 point Review of Systems is negative other than noted in the HPI or here.    Past Medical History    I have reviewed this patient's medical history and updated it with pertinent information if needed.   Past Medical History:   Diagnosis Date     AK (actinic keratosis) 4/14/2011     FHX COLON CANCER - SCOPE Q 5Y 5/17/2007 2007 negative.      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  Recheck 3-6mo.       Moles 4/14/2011     NO ACTIVE PROBLEMS        Past Surgical History   I have reviewed this patient's surgical history and updated it with pertinent information if  needed.  Past Surgical History:   Procedure Laterality Date     C APPENDECTOMY  1960     COLONOSCOPY  8/10/2012    Procedure: COLONOSCOPY;  Colonoscopy;  Surgeon: Carlos Alatorre MD;  Location: [facility]     COLONOSCOPY N/A 1/4/2017    Procedure: COLONOSCOPY;  Surgeon: Stepan Samayoa MD;  Location: [facility]     EYE SURGERY  2017     HYSTERECTOMY, PAP NO LONGER INDICATED  2000    Ovaries left     TONSILLECTOMY  1958       Social History   I have reviewed this patient's social history and updated it with pertinent information if needed.  Social History     Tobacco Use     Smoking status: Never Smoker     Smokeless tobacco: Never Used   Substance Use Topics     Alcohol use: Yes     Comment: Social-- 6/wk     Drug use: No       Family History   I have reviewed this patient's family history and updated it with pertinent information if needed.  Family History   Problem Relation Age of Onset     Cancer - colorectal Father      Cancer Daughter      Breast Cancer Daughter      Congenital Anomalies Daughter         trisomy 18     Diabetes No family hx of      Hypertension No family hx of      Cerebrovascular Disease No family hx of      Prostate Cancer No family hx of        Prior to Admission Medications   Prior to Admission Medications   Prescriptions Last Dose Informant Patient Reported? Taking?   atorvastatin (LIPITOR) 20 MG tablet   No No   Sig: Take 1 tablet (20 mg) by mouth daily   losartan (COZAAR) 50 MG tablet   No No   Sig: Take 1 tablet (50 mg) by mouth daily   pantoprazole (PROTONIX) 40 MG EC tablet   No No   Sig: Take 1 tablet (40 mg) by mouth daily   psyllium (METAMUCIL/KONSYL) 58.6 % powder  Self Yes No   Sig: Take 1 teaspoonful by mouth daily   valACYclovir (VALTREX) 1000 mg tablet   No No   Sig: TAKE 2 TABLETS (2,000 MG) BY MOUTH 2 TIMES DAILY DISPENSE AS WRITTEN      Facility-Administered Medications: None     Allergies   Allergies   Allergen Reactions     Nkda [No Known Drug Allergies]         Physical Exam   Vital Signs: Temp: 98.1  F (36.7  C) Temp src: Oral BP: (!) 147/83 Pulse: 76   Resp: 15 SpO2: 96 % O2 Device: None (Room air)    Weight: 140 lbs 0 oz    General: non-toxic and alert female lying in hospital bed oriented x3. She appears mildly uncomfortable and is in no apparent distress.  HEENT: Head normocephalic atraumatic, oral mucosa moist. Sclerae anicteric  CV: Regular rhythm, normal rate, no murmurs  Resp: No wheezes, no rales or rhonchi, no focal consolidations  GI: Belly soft, nondistended, nontender, bowel sounds present  Skin: No rashes or lesions  Extremities: No peripheral edema  Psych: Normal affect, mood euthymic  Neuro: Sensation intact BLE, moving all 4 extremities, cranial nerves grossly intact    Data   Data reviewed today: I reviewed all medications, new labs and imaging results over the last 24 hours.  EKG personally reviewed shows: NSr rate 82 bpm    Recent Results (from the past 12 hour(s))   Basic metabolic panel    Collection Time: 11/13/21  6:06 AM   Result Value Ref Range    Sodium 142 136 - 145 mmol/L    Potassium 3.8 3.5 - 5.0 mmol/L    Chloride 109 (H) 98 - 107 mmol/L    Carbon Dioxide (CO2) 24 22 - 31 mmol/L    Anion Gap 9 5 - 18 mmol/L    Urea Nitrogen 16 8 - 28 mg/dL    Creatinine 0.81 0.60 - 1.10 mg/dL    Calcium 9.6 8.5 - 10.5 mg/dL    Glucose 125 70 - 125 mg/dL    GFR Estimate 73 >60 mL/min/1.73m2   Asymptomatic COVID-19 Virus (Coronavirus) by PCR Nasopharyngeal    Collection Time: 11/13/21  6:06 AM    Specimen: Nasopharyngeal; Swab   Result Value Ref Range    SARS CoV2 PCR Negative Negative   CBC with platelets and differential    Collection Time: 11/13/21  6:06 AM   Result Value Ref Range    WBC Count 7.4 4.0 - 11.0 10e3/uL    RBC Count 4.00 3.80 - 5.20 10e6/uL    Hemoglobin 12.4 11.7 - 15.7 g/dL    Hematocrit 38.0 35.0 - 47.0 %    MCV 95 78 - 100 fL    MCH 31.0 26.5 - 33.0 pg    MCHC 32.6 31.5 - 36.5 g/dL    RDW 12.3 10.0 - 15.0 %    Platelet Count 263 150  - 450 10e3/uL    % Neutrophils 66 %    % Lymphocytes 24 %    % Monocytes 7 %    % Eosinophils 2 %    % Basophils 1 %    % Immature Granulocytes 0 %    NRBCs per 100 WBC 0 <1 /100    Absolute Neutrophils 4.9 1.6 - 8.3 10e3/uL    Absolute Lymphocytes 1.8 0.8 - 5.3 10e3/uL    Absolute Monocytes 0.5 0.0 - 1.3 10e3/uL    Absolute Eosinophils 0.2 0.0 - 0.7 10e3/uL    Absolute Basophils 0.1 0.0 - 0.2 10e3/uL    Absolute Immature Granulocytes 0.0 <=0.0 10e3/uL    Absolute NRBCs 0.0 10e3/uL

## 2021-11-14 ENCOUNTER — APPOINTMENT (OUTPATIENT)
Dept: PHYSICAL THERAPY | Facility: HOSPITAL | Age: 71
End: 2021-11-14
Attending: HOSPITALIST
Payer: COMMERCIAL

## 2021-11-14 ENCOUNTER — APPOINTMENT (OUTPATIENT)
Dept: OCCUPATIONAL THERAPY | Facility: HOSPITAL | Age: 71
End: 2021-11-14
Attending: HOSPITALIST
Payer: COMMERCIAL

## 2021-11-14 VITALS
OXYGEN SATURATION: 96 % | DIASTOLIC BLOOD PRESSURE: 71 MMHG | RESPIRATION RATE: 18 BRPM | TEMPERATURE: 98.1 F | HEIGHT: 64 IN | SYSTOLIC BLOOD PRESSURE: 138 MMHG | HEART RATE: 68 BPM | WEIGHT: 152.3 LBS | BODY MASS INDEX: 26 KG/M2

## 2021-11-14 DIAGNOSIS — E78.5 HYPERLIPIDEMIA LDL GOAL <130: ICD-10-CM

## 2021-11-14 DIAGNOSIS — I10 ESSENTIAL HYPERTENSION WITH GOAL BLOOD PRESSURE LESS THAN 140/90: ICD-10-CM

## 2021-11-14 PROCEDURE — 99217 PR OBSERVATION CARE DISCHARGE: CPT | Performed by: HOSPITALIST

## 2021-11-14 PROCEDURE — 96372 THER/PROPH/DIAG INJ SC/IM: CPT | Performed by: HOSPITALIST

## 2021-11-14 PROCEDURE — 97161 PT EVAL LOW COMPLEX 20 MIN: CPT | Mod: GP

## 2021-11-14 PROCEDURE — 97530 THERAPEUTIC ACTIVITIES: CPT | Mod: GP

## 2021-11-14 PROCEDURE — 97535 SELF CARE MNGMENT TRAINING: CPT | Mod: GO,59

## 2021-11-14 PROCEDURE — G0378 HOSPITAL OBSERVATION PER HR: HCPCS

## 2021-11-14 PROCEDURE — 96376 TX/PRO/DX INJ SAME DRUG ADON: CPT

## 2021-11-14 PROCEDURE — 97116 GAIT TRAINING THERAPY: CPT | Mod: GP

## 2021-11-14 PROCEDURE — 97165 OT EVAL LOW COMPLEX 30 MIN: CPT | Mod: GO

## 2021-11-14 PROCEDURE — 250N000011 HC RX IP 250 OP 636: Performed by: HOSPITALIST

## 2021-11-14 PROCEDURE — 250N000013 HC RX MED GY IP 250 OP 250 PS 637: Performed by: HOSPITALIST

## 2021-11-14 RX ORDER — DOCUSATE SODIUM 100 MG/1
100 CAPSULE, LIQUID FILLED ORAL 2 TIMES DAILY
Qty: 60 CAPSULE | Refills: 0 | Status: SHIPPED | OUTPATIENT
Start: 2021-11-14 | End: 2022-08-30

## 2021-11-14 RX ORDER — OXYCODONE HYDROCHLORIDE 5 MG/1
2.5 TABLET ORAL EVERY 6 HOURS PRN
Qty: 6 TABLET | Refills: 0 | Status: SHIPPED | OUTPATIENT
Start: 2021-11-14 | End: 2022-08-30

## 2021-11-14 RX ORDER — POLYETHYLENE GLYCOL 3350 17 G/17G
17 POWDER, FOR SOLUTION ORAL DAILY
Qty: 510 G | Refills: 0 | Status: SHIPPED | OUTPATIENT
Start: 2021-11-14 | End: 2022-08-30

## 2021-11-14 RX ORDER — ACETAMINOPHEN 325 MG/1
650 TABLET ORAL 4 TIMES DAILY
COMMUNITY
Start: 2021-11-14 | End: 2022-08-30

## 2021-11-14 RX ORDER — LIDOCAINE 4 G/G
1-2 PATCH TOPICAL EVERY 24 HOURS
Qty: 10 PATCH | Refills: 0 | Status: SHIPPED | OUTPATIENT
Start: 2021-11-14 | End: 2022-08-30

## 2021-11-14 RX ORDER — METHOCARBAMOL 500 MG/1
500 TABLET, FILM COATED ORAL 4 TIMES DAILY
Status: DISCONTINUED | OUTPATIENT
Start: 2021-11-14 | End: 2021-11-14 | Stop reason: HOSPADM

## 2021-11-14 RX ORDER — POLYETHYLENE GLYCOL 3350 17 G/17G
17 POWDER, FOR SOLUTION ORAL DAILY
Status: DISCONTINUED | OUTPATIENT
Start: 2021-11-14 | End: 2021-11-14 | Stop reason: HOSPADM

## 2021-11-14 RX ORDER — METHOCARBAMOL 500 MG/1
500 TABLET, FILM COATED ORAL 4 TIMES DAILY
Qty: 60 TABLET | Refills: 0 | Status: SHIPPED | OUTPATIENT
Start: 2021-11-14 | End: 2021-11-23

## 2021-11-14 RX ADMIN — PANTOPRAZOLE SODIUM 40 MG: 20 TABLET, DELAYED RELEASE ORAL at 08:30

## 2021-11-14 RX ADMIN — OXYCODONE HYDROCHLORIDE 2.5 MG: 5 TABLET ORAL at 01:01

## 2021-11-14 RX ADMIN — ATORVASTATIN CALCIUM 20 MG: 10 TABLET, FILM COATED ORAL at 08:30

## 2021-11-14 RX ADMIN — OXYCODONE HYDROCHLORIDE 2.5 MG: 5 TABLET ORAL at 05:05

## 2021-11-14 RX ADMIN — METHOCARBAMOL 500 MG: 500 TABLET, FILM COATED ORAL at 12:04

## 2021-11-14 RX ADMIN — ACETAMINOPHEN 650 MG: 325 TABLET ORAL at 12:00

## 2021-11-14 RX ADMIN — HEPARIN SODIUM 5000 UNITS: 10000 INJECTION, SOLUTION INTRAVENOUS; SUBCUTANEOUS at 08:21

## 2021-11-14 RX ADMIN — OXYCODONE HYDROCHLORIDE 2.5 MG: 5 TABLET ORAL at 10:51

## 2021-11-14 RX ADMIN — METHOCARBAMOL 500 MG: 500 TABLET, FILM COATED ORAL at 08:30

## 2021-11-14 RX ADMIN — LOSARTAN POTASSIUM 50 MG: 50 TABLET, FILM COATED ORAL at 08:30

## 2021-11-14 RX ADMIN — POLYETHYLENE GLYCOL 3350 17 G: 17 POWDER, FOR SOLUTION ORAL at 10:52

## 2021-11-14 RX ADMIN — HYDROMORPHONE HYDROCHLORIDE 0.2 MG: 1 INJECTION, SOLUTION INTRAMUSCULAR; INTRAVENOUS; SUBCUTANEOUS at 08:28

## 2021-11-14 RX ADMIN — DOCUSATE SODIUM 100 MG: 100 CAPSULE, LIQUID FILLED ORAL at 08:30

## 2021-11-14 RX ADMIN — ACETAMINOPHEN 650 MG: 325 TABLET ORAL at 08:30

## 2021-11-14 ASSESSMENT — ACTIVITIES OF DAILY LIVING (ADL): PREVIOUS_RESPONSIBILITIES: MEAL PREP;HOUSEKEEPING;LAUNDRY;SHOPPING;YARDWORK;MEDICATION MANAGEMENT

## 2021-11-14 NOTE — PROGRESS NOTES
patient reports still with pain but better today. has been utilizing IV dilaudid and oxycodone for pain. pain is worst with movement. patient does find muscle relaxer helpful and we will increase this. PT and OT to assess. patient thinks she can probably manage at home with current amount of help. we will plan hopefully discharge later today. discussed opioid induced constipation, patient has not had a BM, will add miralax. downs MD full note to follow

## 2021-11-14 NOTE — PLAN OF CARE
Problem: Adult Inpatient Plan of Care  Goal: Plan of Care Review  Outcome: Improving   Mild to moderate back pain. Dilaudid iv, Oxycodone and scheduled Tylenol for pain control. Fall precaution in place. Alert/orientated, call light w/in reach.

## 2021-11-14 NOTE — PROGRESS NOTES
[facility]      OUTPATIENT PHYSICAL THERAPY EVALUATION  PLAN OF TREATMENT FOR OUTPATIENT REHABILITATION  (COMPLETE FOR INITIAL CLAIMS ONLY)  Patient's Last Name, First Name, M.I.  YOB: 1950  HernandezKaren                        Provider's Name  [facility] Medical Record No.  [id number]                               Onset Date:      Start of Care Date:         Type:     _X_PT   ___OT   ___SLP Medical Diagnosis:                           PT Diagnosis:      Visits from SOC:  1   _________________________________________________________________________________  Plan of Treatment/Functional Goals    Planned Interventions:       Goals: See Physical Therapy Goals on Care Plan in [employer] electronic health record.    Therapy Frequency:    Predicted Duration of Therapy Intervention:    _________________________________________________________________________________    I CERTIFY THE NEED FOR THESE SERVICES FURNISHED UNDER        THIS PLAN OF TREATMENT AND WHILE UNDER MY CARE     (Physician co-signature of this document indicates review and certification of the therapy plan).                 ,      Referring Physician: Natalie Le            Initial Assessment        See Physical Therapy evaluation dated   in Epic electronic health record.

## 2021-11-14 NOTE — PROGRESS NOTES
[facility]      OUTPATIENT OCCUPATIONAL THERAPY  EVALUATION  PLAN OF TREATMENT FOR OUTPATIENT REHABILITATION  (COMPLETE FOR INITIAL CLAIMS ONLY)  Patient's Last Name, First Name, M.I.  YOB: 1950  Karen Hernandez                          Provider's Name  [facility] Medical Record No.  [id number]                               Onset Date:  (P) 11/13/21   Start of Care Date:  (P) 11/14/21     Type:     ___PT   _X_OT   ___SLP Medical Diagnosis:  (P) fall                        OT Diagnosis:  (P) weakness, fatigue, pain   Visits from SOC:  1   _________________________________________________________________________________  Plan of Treatment/Functional Goals    Planned Interventions: (P) ADL retraining   Goals: See Occupational Therapy Goals on Care Plan in [employer] electronic health record.    Therapy Frequency: (P) Daily  Predicted Duration of Therapy Intervention: (P) 7 days   _________________________________________________________________________________    I CERTIFY THE NEED FOR THESE SERVICES FURNISHED UNDER        THIS PLAN OF TREATMENT AND WHILE UNDER MY CARE     (Physician co-signature of this document indicates review and certification of the therapy plan).                Certification date from: (P) 11/14/21, Certification date to: (P) 11/20/21    Referring Physician: Dr Rey (P)            Initial Assessment        See Occupational Therapy evaluation dated (P) 11/14/21 in Epic electronic health record.

## 2021-11-14 NOTE — PLAN OF CARE
Problem: Adult Inpatient Plan of Care  Goal: Plan of Care Review  Outcome: Adequate for Discharge       Dc'd to home with daughter after seen by PT/OT; Pain well controlled on oral meds

## 2021-11-14 NOTE — PLAN OF CARE
"PRIMARY DIAGNOSIS: \"GENERIC\" NURSING  OUTPATIENT/OBSERVATION GOALS TO BE MET BEFORE DISCHARGE:  ADLs back to baseline: Yes    Activity and level of assistance: Up with standby assistance.    Pain status: Improved-controlled with oral pain medications.    Return to near baseline physical activity: Yes     Discharge Planner Nurse   Safe discharge environment identified: Yes  Barriers to discharge: No       Entered by: Kalli Sy 11/14/2021 5:40 AM   5-6/10 Back pain relieved with prn Oxycodone.  Denies dizziness.    Please review provider order for any additional goals.   Nurse to notify provider when observation goals have been met and patient is ready for discharge.  "

## 2021-11-14 NOTE — PLAN OF CARE
"PRIMARY DIAGNOSIS: \"GENERIC\" NURSING  OUTPATIENT/OBSERVATION GOALS TO BE MET BEFORE DISCHARGE:  ADLs back to baseline: Yes    Activity and level of assistance: Up with standby assistance.    Pain status: Improved-controlled with oral pain medications.    Return to near baseline physical activity: Yes     Discharge Planner Nurse   Safe discharge environment identified: Yes  Barriers to discharge: No       Entered by: Kalli Sy 11/14/2021 3:11 AM   Pt reports back discomfort of 4-5/10, decreasing with prn Oxycodone.  Denies dizziness with ambulation.  Call light within reach.    Please review provider order for any additional goals.   Nurse to notify provider when observation goals have been met and patient is ready for discharge.  "

## 2021-11-14 NOTE — PROGRESS NOTES
Occupational Therapy     11/14/21 1007   Quick Adds   Type of Visit Initial Occupational Therapy Evaluation   Living Environment   People in home spouse   Current Living Arrangements house   Home Accessibility stairs to enter home;stairs within home   Number of Stairs, Main Entrance 1   Stair Railings, Main Entrance railings safe and in good condition   Number of Stairs, Within Home, Primary 3   Stair Railings, Within Home, Primary railings safe and in good condition   Transportation Anticipated car, drives self   Self-Care   Usual Activity Tolerance good   Current Activity Tolerance fair   Regular Exercise No   Equipment Currently Used at Home walker, rolling   Instrumental Activities of Daily Living (IADL)   Previous Responsibilities meal prep;housekeeping;laundry;shopping;yardwork;medication management   Disability/Function   Hearing Difficulty or Deaf no   Wear Glasses or Blind yes   Vision Management glasses   Concentrating, Remembering or Making Decisions Difficulty no   Difficulty Communicating no   Difficulty Eating/Swallowing no   Walking or Climbing Stairs Difficulty no   Dressing/Bathing Difficulty no   Toileting issues no   Doing Errands Independently Difficulty (such as shopping) no   Fall history within last six months no   Number of times patient has fallen within last six months 1   General Information   Onset of Illness/Injury or Date of Surgery 11/13/21   Referring Physician Dr Le   Patient/Family Therapy Goal Statement (OT) go home   Existing Precautions/Restrictions spinal   Limitations/Impairments other (see comments)  (none)   Left Upper Extremity (Weight-bearing Status) full weight-bearing (FWB)   Right Upper Extremity (Weight-bearing Status) full weight-bearing (FWB)   Left Lower Extremity (Weight-bearing Status) full weight-bearing (FWB)   Right Lower Extremity (Weight-bearing Status) full weight-bearing (FWB)   Cognitive Status Examination   Orientation Status orientation to  person, place and time   Affect/Mental Status (Cognitive) WNL   Follows Commands WFL   Visual Perception   Visual Impairment/Limitations corrective lenses for reading   Sensory   Sensory Quick Adds No deficits were identified   Pain Assessment   Patient Currently in Pain Yes, see Vital Sign flowsheet   Integumentary/Edema   Integumentary/Edema no deficits were identifed   Posture   Posture forward head position   Range of Motion Comprehensive   General Range of Motion no range of motion deficits identified   Strength Comprehensive (MMT)   General Manual Muscle Testing (MMT) Assessment no strength deficits identified   Muscle Tone Assessment   Muscle Tone Quick Adds No deficits were identified   Coordination   Upper Extremity Coordination No deficits were identified   Bed Mobility   Bed Mobility supine-sit;sit-supine   Supine-Sit [place] (Bed Mobility) supervision   Sit-Supine [place] (Bed Mobility) supervision   Assistive Device (Bed Mobility) other (see comments)  (none)   Comment (Bed Mobility) logroll instruction   Transfers   Transfers sit-stand transfer   Transfer Comments SBA   Sit-Stand Transfer   Sit-Stand [place] (Transfers) supervision   Assistive Device (Sit-Stand Transfers) walker, front-wheeled   Sit/Stand Transfer Comments cues for safety   Balance   Balance Assessment sit to stand balance   Sit-to-Stand Balance good balance   Activities of Daily Living   BADL Assessment lower body dressing   Lower Body Dressing Assessment   [place] Level (Lower Body Dressing) supervision   Assistive Devices (Lower Body Dressing) other (see comments)  (none)   Position (Lower Body Dressing) unsupported sitting;unsupported standing   Clinical Impression   Criteria for Skilled Therapeutic Interventions Met (OT) skilled treatment is necessary   OT Diagnosis weakness, fatigue, pain   OT Problem List-Impairments impacting ADL balance;pain   Assessment of Occupational Performance 3-5 Performance Deficits    Planned Therapy Interventions (OT) ADL retraining   Clinical Decision Making Complexity (OT) moderate complexity   Therapy Frequency (OT) Daily   Predicted Duration of Therapy 7 days    Anticipated Equipment Needs Upon Discharge (OT) shower chair   Risk & Benefits of therapy have been explained care plan/treatment goals reviewed   OT Discharge Planning    OT Discharge Recommendation (DC Rec) Home with assist   Therapy Certification   Start of Care Date 11/14/21   Certification date from 11/14/21   Certification date to 11/20/21   Medical Diagnosis fall   Total Evaluation Time (Minutes)   Total Evaluation Time (Minutes) 15

## 2021-11-14 NOTE — DISCHARGE SUMMARY
[facility] MEDICINE  DISCHARGE SUMMARY     Primary Care Physician: Delphine Aguilar  Admission Date: 11/13/2021   Discharge Provider: Natalie Le Discharge Date: 11/14/2021   Diet:   Active Diet and Nourishment Order   Procedures     Regular Diet Adult     Diet       Code Status: Full Code   Activity: DCACTIVITY: Activity as tolerated        Condition at Discharge: Stable     REASON FOR PRESENTATION(See Admission Note for Details)   Back pain after fall    PRINCIPAL & ACTIVE DISCHARGE DIAGNOSES     Active Problems:    Fall      PENDING LABS     Unresulted Labs Ordered in the Past 30 Days of this Admission     No orders found for last 31 day(s).          PROCEDURES ( this hospitalization only)      none    RECOMMENDATIONS TO OUTPATIENT PROVIDER FOR F/U VISIT     Follow-up Appointments     Follow-up and recommended labs and tests       Follow up with primary care provider, Delphine Aguilar, within 7 days   for hospital follow- up.             DISPOSITION     Home    SUMMARY OF HOSPITAL COURSE:      Karen Hernandez is a 71 year old female admitted on 11/13/2021. She has history of GERD, hyperlipidemia presents for evaluation of low back pain after a fall found to have L1/L2 transverse process fractures     #Acute fractures of right L1/L2 transverse processes  Sustained in mechanical fall down 3 stairs  CT showing no other fractures. Near complete lumbarization of S1 was noted and she would need levels double checked prior to any interventional procedure in the future.  Neuro intact, no radiating pain. No neurosurg consultation needed at this time  Patient was having too much pain to function at home and so brought into the hospital for management  Patient's pain is doing better today, seen by PT and OT and do feel she is able to manage at home with current level of assistance.  Patient was prescribed lidocaine patch, Robaxin, and 6 tabs of oxycodone which she is to use  judiciously.  Outpatient follow-up for existing diagnosis of osteopenia    Discharge Medications with Med changes:     Current Discharge Medication List      START taking these medications    Details   acetaminophen (TYLENOL) 325 MG tablet Take 2 tablets (650 mg) by mouth 4 times daily    Associated Diagnoses: Lumbar transverse process fracture, closed, initial encounter (H)      docusate sodium (COLACE) 100 MG capsule Take 1 capsule (100 mg) by mouth 2 times daily  Qty: 60 capsule, Refills: 0    Associated Diagnoses: Lumbar transverse process fracture, closed, initial encounter (H)      Lidocaine (LIDOCARE) 4 % Patch Place 1-2 patches onto the skin every 24 hours To prevent lidocaine toxicity, patient should be patch free for 12 hrs daily.  Qty: 10 patch, Refills: 0    Associated Diagnoses: Lumbar transverse process fracture, closed, initial encounter (H)      methocarbamol (ROBAXIN) 500 MG tablet Take 1 tablet (500 mg) by mouth 4 times daily  Qty: 60 tablet, Refills: 0    Associated Diagnoses: Lumbar transverse process fracture, closed, initial encounter (H)      oxyCODONE (ROXICODONE) 5 MG tablet Take 0.5 tablets (2.5 mg) by mouth every 6 hours as needed for severe pain  Qty: 6 tablet, Refills: 0    Associated Diagnoses: Lumbar transverse process fracture, closed, initial encounter (H)      polyethylene glycol (MIRALAX) 17 GM/Dose powder Take 17 g by mouth daily  Qty: 510 g, Refills: 0    Associated Diagnoses: Lumbar transverse process fracture, closed, initial encounter (H)         CONTINUE these medications which have NOT CHANGED    Details   atorvastatin (LIPITOR) 20 MG tablet Take 1 tablet (20 mg) by mouth daily  Qty: 90 tablet, Refills: 1    Associated Diagnoses: Hyperlipidemia LDL goal <130      losartan (COZAAR) 50 MG tablet Take 1 tablet (50 mg) by mouth daily  Qty: 90 tablet, Refills: 3    Associated Diagnoses: Essential hypertension with goal blood pressure less than 140/90      pantoprazole (PROTONIX)  40 MG EC tablet Take 1 tablet (40 mg) by mouth daily  Qty: 90 tablet, Refills: 3    Associated Diagnoses: Gastroesophageal reflux disease with esophagitis without hemorrhage      psyllium (METAMUCIL/KONSYL) 58.6 % powder Take 1 teaspoonful by mouth daily as needed       valACYclovir (VALTREX) 1000 mg tablet TAKE 2 TABLETS (2,000 MG) BY MOUTH 2 TIMES DAILY DISPENSE AS WRITTEN  Qty: 60 tablet, Refills: 1    Associated Diagnoses: Recurrent cold sores               Consults     PHYSICAL THERAPY ADULT IP CONSULT  OCCUPATIONAL THERAPY ADULT IP CONSULT  SOCIAL WORK IP CONSULT    SIGNIFICANT IMAGING FINDINGS     Results for orders placed or performed during the hospital encounter of 11/13/21   Lumbar spine CT w/o contrast    Impression    IMPRESSION:  1.  There is transitional lumbosacral anatomy with near-complete lumbarization of S1 for the purposes of this dictation. Careful plain film correlation is essential prior to any planned surgical or procedural intervention.  2.  Using this numbering scheme, there are acute nondisplaced fractures through the right transverse process of L1 and L2.  3.  Grade 1 degenerative anterolisthesis of L5 on S1.   CT Pelvis Bone wo Contrast    Impression    IMPRESSION:  1.  Negative for evidence of fracture or dislocation in the bones about the pelvis.    2.  Mild degenerative arthritis both hips. This includes areas of subchondral cystic degenerative change in the acetabula bilaterally. On the right side, this is associated with a small amount of air in the areas of subchondral cystic degenerative change   as well as some air in the soft tissues anterior to the right hip. These findings are felt to be related to degenerative change of the right hip and have also been present previously although are more pronounced on the current study. If the patient's   symptoms persist, MRI could be considered in further evaluation.    3.  Hysterectomy.    4.  Sigmoid diverticulosis without evidence for  diverticulitis.    5.  No fluid collections or evidence of hematoma.     XR Pelvis and Hip Right 2 Views    Impression    IMPRESSION: No acute fracture or dislocation.       SIGNIFICANT LABORATORY FINDINGS     n/a    Discharge Orders        Reason for your hospital stay    Vertebral fractures (transverse process fractures) at L1 and L2     Follow-up and recommended labs and tests     Follow up with primary care provider, Delphine Aguilar, within 7 days for hospital follow- up.     Activity    Your activity upon discharge: activity as tolerated     When to contact your care team    Call your primary doctor if you have any of the following: numbness/tingling shooting down your leg, unable to urinate, constipation, or any other symptoms that are new or concerning to you.     Diet    Follow this diet upon discharge: Resume your regular diet       Examination   Physical Exam   Temp:  [97.7  F (36.5  C)-98.2  F (36.8  C)] 97.8  F (36.6  C)  Pulse:  [61-83] 75  Resp:  [14-51] 18  BP: (131-172)/(65-91) 148/65  SpO2:  [94 %-97 %] 95 %  Wt Readings from Last 1 Encounters:   11/13/21 69.1 kg (152 lb 4.8 oz)       General: non-toxic and alert female lying in hospital bed oriented x3. She appears comfortable and is in no apparent distress.  HEENT: Head normocephalic atraumatic, oral mucosa moist. Sclerae anicteric  Skin: No rashes or lesions  Extremities: No peripheral edema  Psych: Normal affect, mood euthymic  Neuro: Sensation intact BLE, moving all 4 extremities, cranial nerves grossly intact     Please see EMR for more detailed significant labs, imaging, consultant notes etc.    I, Natalie Le MD, personally saw the patient today and spent greater than 30 minutes discharging this patient.    Natalie Le MD  [facility]    CC:Delphine Aguilar

## 2021-11-14 NOTE — PLAN OF CARE
Physical Therapy Discharge Summary    Reason for therapy discharge:    All goals and outcomes met, no further needs identified.    Progress towards therapy goal(s). See goals on Care Plan in [facility] electronic health record for goal details.  Goals met    Therapy recommendation(s):    No further therapy is recommended.    Jennifer Harry, PT

## 2021-11-14 NOTE — PROGRESS NOTES
Physical Therapy        11/14/21 0900   Quick Adds   Quick Adds Certification   Type of Visit Initial PT Evaluation   Living Environment   Living Environment Comments see OT eval   General Information   Onset of Illness/Injury or Date of Surgery 11/13/21   Referring Physician Natalie Le MD   Patient/Family Therapy Goals Statement (PT) home   Pertinent History of Current Problem (include personal factors and/or comorbidities that impact the POC) L1-L2 TP fx   Bed Mobility   Comment (Bed Mobility) supine<>sit with cues for log roll, SBA   Transfers   Transfer Safety Comments sit<>stand with FWW, SBA   Gait/Stairs (Locomotion)   Distance in Feet (Required for LE Total Joints) 200'   Comment (Gait/Stairs) SBA with FWW   Balance   Balance Comments tentative without UE support, no LOB   Sensory Examination   Sensory Perception WNL   Clinical Impression   Criteria for Skilled Therapeutic Intervention yes, treatment indicated   PT Diagnosis (PT) difficulty with walking and transfers   Influenced by the following impairments pain 2/2 fractures   Functional limitations due to impairments impaired household mobility   Clinical Presentation Stable/Uncomplicated   Clinical Presentation Rationale presents as medically diagnosed   Clinical Decision Making (Complexity) low complexity   Therapy Frequency (PT) One time eval and treatment only   Predicted Duration of Therapy Intervention (days/wks) 4 days   Planned Therapy Interventions (PT) bed mobility training;gait training;transfer training;stair training   Anticipated Equipment Needs at Discharge (PT) walker, rolling   Risk & Benefits of therapy have been explained evaluation/treatment results reviewed;care plan/treatment goals reviewed;participants voiced agreement with care plan;participants included;patient   PT Discharge Planning    PT Discharge Recommendation (DC Rec) home with assist   PT Rationale for DC Rec ready to DC home today with family support following OT    Therapy Certification   Start of care date 11/14/21   Certification date from 11/14/21   Certification date to 11/21/21   Medical Diagnosis fall, L1, L2 TP fx   Total Evaluation Time   Total Evaluation Time (Minutes) 10       JER ShahT 11/14/2021

## 2021-11-15 ENCOUNTER — PATIENT OUTREACH (OUTPATIENT)
Dept: CARE COORDINATION | Facility: CLINIC | Age: 71
End: 2021-11-15
Payer: COMMERCIAL

## 2021-11-15 DIAGNOSIS — Z71.89 OTHER SPECIFIED COUNSELING: ICD-10-CM

## 2021-11-15 NOTE — PROGRESS NOTES
"Clinic Care Coordination Contact  [facility]: Post-Discharge Note  SITUATION                                                      Admission:    Admission Date: 11/13/21   Reason for Admission: low back pain after a fall  Discharge:   Discharge Date: 11/14/21  Discharge Diagnosis: L1/L2 transverse process fractures    BACKGROUND                                                      Karen Hernandez is a 71 year old female admitted on 11/13/2021. She has history of GERD, hyperlipidemia presents for evaluation of low back pain after a fall found to have L1/L2 transverse process fractures     #Acute fractures of L1/L2 transverse processes  Sustained in mechanical fall down 3 stairs  CT showing no other fractures. Near complete lumbarization of S1 was noted and she would need levels double checked prior to any interventional procedure in the future.  Neuro intact, no radiating pain. No neurosurg consultation needed at this time  Conservative management with ice, lidocaine patch, scheduled Tylenol, Robaxin, PT/OT  Oral oxycodone, IV dilaudid for breakthrough pain  Outpatient follow-up for existing diagnosis of osteopenia     #Hyperlipidemia, home statin  #Hypertension, home losartan  #GERD, home PPI  #Risky use of alcohol, consumes multiple alcoholic beverages most days. Would recommend decreasing use for long term health.       ASSESSMENT      Enrollment  Primary Care Care Coordination Status: Declined    Discharge Assessment  How are you doing now that you are home?: \" Doing pretty good\"  How are your symptoms? (Red Flag symptoms escalate to triage hotline per guidelines): Improved  Do you feel your condition is stable enough to be safe at home until your provider visit?: Yes  Does the patient have their discharge instructions? : Yes  Does the patient have questions regarding their discharge instructions? : No  Were you started on any new medications or were there changes to any of your previous " medications? : Yes  Does the patient have all of their medications?: Yes  Do you have questions regarding any of your medications? : No  Do you have all of your needed medical supplies or equipment (DME)?  (i.e. oxygen tank, CPAP, cane, etc.): Yes  Discharge follow-up appointment scheduled within 14 calendar days? : Yes  Discharge Follow Up Appointment Date: 11/22/21  Discharge Follow Up Appointment Scheduled with?: Specialty Care Provider    Post-op ([facility] CTA Only)  If the patient had a surgery or procedure, do they have any questions for a nurse?: No             PLAN                                                      Outpatient Plan:  Your activity upon discharge: activity as tolerated  Follow this diet upon discharge: Resume your regular diet  Follow-up and recommended labs and tests  Follow up with primary care provider, Delphine Aguilar, within 7 days for hospital follow- up.    Future Appointments   Date Time Provider Department Center   11/22/2021  2:30 PM MARCELA ORTIZ         For any urgent concerns, please contact our 24 hour nurse triage line: [phone] ([phone])         Bella Fuentes MA

## 2021-11-16 RX ORDER — ATORVASTATIN CALCIUM 20 MG/1
TABLET, FILM COATED ORAL
Qty: 90 TABLET | Refills: 0 | Status: SHIPPED | OUTPATIENT
Start: 2021-11-16 | End: 2021-11-23

## 2021-11-16 RX ORDER — LOSARTAN POTASSIUM 50 MG/1
TABLET ORAL
Qty: 90 TABLET | Refills: 0 | Status: SHIPPED | OUTPATIENT
Start: 2021-11-16 | End: 2021-11-23

## 2021-11-22 ENCOUNTER — HOSPITAL ENCOUNTER (OUTPATIENT)
Dept: MAMMOGRAPHY | Facility: CLINIC | Age: 71
Discharge: HOME | End: 2021-11-22
Attending: PHYSICIAN ASSISTANT | Admitting: PHYSICIAN ASSISTANT
Payer: COMMERCIAL

## 2021-11-22 DIAGNOSIS — Z12.31 SCREENING MAMMOGRAM FOR HIGH-RISK PATIENT: ICD-10-CM

## 2021-11-22 PROCEDURE — 77063 BREAST TOMOSYNTHESIS BI: CPT

## 2021-11-23 ENCOUNTER — OFFICE VISIT (OUTPATIENT)
Dept: FAMILY MEDICINE | Facility: CLINIC | Age: 71
End: 2021-11-23
Payer: COMMERCIAL

## 2021-11-23 VITALS
SYSTOLIC BLOOD PRESSURE: 132 MMHG | DIASTOLIC BLOOD PRESSURE: 82 MMHG | OXYGEN SATURATION: 98 % | WEIGHT: 150 LBS | HEART RATE: 85 BPM | HEIGHT: 64 IN | BODY MASS INDEX: 25.61 KG/M2 | RESPIRATION RATE: 16 BRPM | TEMPERATURE: 99 F

## 2021-11-23 DIAGNOSIS — I10 ESSENTIAL HYPERTENSION WITH GOAL BLOOD PRESSURE LESS THAN 140/90: ICD-10-CM

## 2021-11-23 DIAGNOSIS — M85.80 OSTEOPENIA, UNSPECIFIED LOCATION: ICD-10-CM

## 2021-11-23 DIAGNOSIS — E78.5 HYPERLIPIDEMIA LDL GOAL <130: ICD-10-CM

## 2021-11-23 DIAGNOSIS — Z78.0 ASYMPTOMATIC MENOPAUSAL STATE: ICD-10-CM

## 2021-11-23 DIAGNOSIS — S32.009A LUMBAR TRANSVERSE PROCESS FRACTURE, CLOSED, INITIAL ENCOUNTER (H): Primary | ICD-10-CM

## 2021-11-23 DIAGNOSIS — K21.00 GASTROESOPHAGEAL REFLUX DISEASE WITH ESOPHAGITIS WITHOUT HEMORRHAGE: ICD-10-CM

## 2021-11-23 DIAGNOSIS — Z13.820 SCREENING FOR OSTEOPOROSIS: ICD-10-CM

## 2021-11-23 PROCEDURE — 99495 TRANSJ CARE MGMT MOD F2F 14D: CPT | Performed by: PHYSICIAN ASSISTANT

## 2021-11-23 RX ORDER — METHOCARBAMOL 500 MG/1
500 TABLET, FILM COATED ORAL 4 TIMES DAILY
Qty: 60 TABLET | Refills: 0 | Status: SHIPPED | OUTPATIENT
Start: 2021-11-23 | End: 2022-08-30

## 2021-11-23 RX ORDER — LOSARTAN POTASSIUM 50 MG/1
50 TABLET ORAL DAILY
Qty: 90 TABLET | Refills: 3 | Status: SHIPPED | OUTPATIENT
Start: 2021-11-23 | End: 2022-01-20

## 2021-11-23 RX ORDER — ATORVASTATIN CALCIUM 20 MG/1
20 TABLET, FILM COATED ORAL DAILY
Qty: 90 TABLET | Refills: 3 | Status: SHIPPED | OUTPATIENT
Start: 2021-11-23 | End: 2023-01-19

## 2021-11-23 RX ORDER — PANTOPRAZOLE SODIUM 40 MG/1
40 TABLET, DELAYED RELEASE ORAL DAILY
Qty: 90 TABLET | Refills: 3 | Status: SHIPPED | OUTPATIENT
Start: 2021-11-23 | End: 2023-01-05

## 2021-11-23 ASSESSMENT — MIFFLIN-ST. JEOR: SCORE: 1180.4

## 2021-11-23 NOTE — PROGRESS NOTES
"  Assessment & Plan     (S32.009A) Lumbar transverse process fracture, closed, initial encounter (H)  (primary encounter diagnosis)  Comment: doing well. Not using any pain medications. Still most sore at bedtime but is sleeping okay. Muscle relaxers seem to help the most - she still has some leftover but did refill today so she can continue to use as needed with plan that she will be able to eventually taper down with further healing  Plan: methocarbamol (ROBAXIN) 500 MG tablet            (E78.5) Hyperlipidemia LDL goal <130  Comment: refilled today  Plan: atorvastatin (LIPITOR) 20 MG tablet            (I10) Essential hypertension with goal blood pressure less than 140/90  Comment: stable - refilled today. Labs in ED  Plan: losartan (COZAAR) 50 MG tablet            (K21.00) Gastroesophageal reflux disease with esophagitis without hemorrhage  Comment: working well for patient. continue  Plan: pantoprazole (PROTONIX) 40 MG EC tablet            (Z13.820) Screening for osteoporosis  Comment: due for DEXA anyway. Discussed waiting a few months for current fracture to heal   Plan: DX Hip/Pelvis/Spine            (Z78.0) Asymptomatic menopausal state   Comment:   Plan: DX Hip/Pelvis/Spine            (M85.80) Osteopenia, unspecified location  Comment:   Plan: repeat DEXA      BMI:   Estimated body mass index is 25.75 kg/m  as calculated from the following:    Height as of this encounter: 1.626 m (5' 4\").    Weight as of this encounter: 68 kg (150 lb).     No follow-ups on file.    DEMIAN [facility] SUMAN Jones is a 71 year old who presents for the following health issues     HPI     Post Discharge Outreach 11/15/2021   Admission Date 11/13/2021   Reason for Admission low back pain after a fall   Discharge Date 11/14/2021   Discharge Diagnosis L1/L2 transverse process fractures   How are you doing now that you are home? \" Doing pretty good\"   How are your symptoms? (Red Flag " symptoms escalate to triage hotline per guidelines) Improved   Do you feel your condition is stable enough to be safe at home until your provider visit? Yes   Does the patient have their discharge instructions?  Yes   Does the patient have questions regarding their discharge instructions?  No   Were you started on any new medications or were there changes to any of your previous medications?  Yes   Does the patient have all of their medications? Yes   Do you have questions regarding any of your medications?  No   Do you have all of your needed medical supplies or equipment (DME)?  (i.e. oxygen tank, CPAP, cane, etc.) Yes   Discharge follow-up appointment scheduled within 14 calendar days?  Yes   Discharge Follow Up Appointment Date 11/22/2021   Discharge Follow Up Appointment Scheduled with? Specialty Care Provider     Hospital Follow-up Visit:    Hospital/Nursing Home/ Rehab Facility: [facility]  Date of Admission: 11/13/2021  Date of Discharge: 11/14/2021  Reason(s) for Admission: fall, fracture vertebrae      Was your hospitalization related to COVID-19? No   Problems taking medications regularly:  None  Medication changes since discharge: None other than not using pain medication  Problems adhering to non-medication therapy:  None    Summary of hospitalization:  [facility] discharge summary reviewed  Diagnostic Tests/Treatments reviewed.  Follow up needed: DEXA  Other Healthcare Providers Involved in Patient s Care:         None  Update since discharge: improved.   Post Discharge Medication Reconciliation: discharge medications reconciled, continue medications without change.  Plan of care communicated with patient        Tripped and fell down 3 stairs   Would not have come to hospital except that she was not able to manage her pain well at home  By discharge was feeling better - PT/OT evaluation felt she was able to manage at home safely    She tells me she is doing  "well today - only here because she was told to come in for a follow up  Pain controlled - not using the pain medications  Muscle relaxers seem to help so has been using those up to 4x daily    Review of Systems   Remainder of ROS obtained and found to be negative other than that which was documented above        Objective    /82 (BP Location: Right arm, Patient Position: Sitting, Cuff Size: Adult Regular)   Pulse 85   Temp 99  F (37.2  C) (Tympanic)   Resp 16   Ht 1.626 m (5' 4\")   Wt 68 kg (150 lb)   LMP  (LMP Unknown)   SpO2 98%   Breastfeeding No   BMI 25.75 kg/m    Body mass index is 25.75 kg/m .  Physical Exam   GENERAL: healthy, alert and no distress  NEURO: Normal strength and tone, mentation intact and speech normal  PSYCH: mentation appears normal, affect normal/bright    Diagnostic Tests:  Reviewed ED imaging/labs        "

## 2022-01-19 ENCOUNTER — TELEPHONE (OUTPATIENT)
Dept: FAMILY MEDICINE | Facility: CLINIC | Age: 72
End: 2022-01-19
Payer: COMMERCIAL

## 2022-01-19 DIAGNOSIS — I10 ESSENTIAL HYPERTENSION WITH GOAL BLOOD PRESSURE LESS THAN 140/90: Primary | ICD-10-CM

## 2022-01-20 ENCOUNTER — HOSPITAL ENCOUNTER (OUTPATIENT)
Dept: BONE DENSITY | Facility: CLINIC | Age: 72
Discharge: HOME | End: 2022-01-20
Attending: PHYSICIAN ASSISTANT | Admitting: PHYSICIAN ASSISTANT
Payer: COMMERCIAL

## 2022-01-20 DIAGNOSIS — Z78.0 ASYMPTOMATIC MENOPAUSAL STATE: ICD-10-CM

## 2022-01-20 DIAGNOSIS — Z13.820 SCREENING FOR OSTEOPOROSIS: ICD-10-CM

## 2022-01-20 PROCEDURE — 77080 DXA BONE DENSITY AXIAL: CPT

## 2022-01-20 RX ORDER — LOSARTAN POTASSIUM 100 MG/1
50 TABLET ORAL DAILY
Qty: 45 TABLET | Refills: 3 | Status: SHIPPED | OUTPATIENT
Start: 2022-01-20 | End: 2022-08-30

## 2022-07-09 DIAGNOSIS — I10 ESSENTIAL HYPERTENSION WITH GOAL BLOOD PRESSURE LESS THAN 140/90: ICD-10-CM

## 2022-07-09 DIAGNOSIS — L82.1 KERATOSIS, SEBORRHEIC: Primary | ICD-10-CM

## 2022-07-11 RX ORDER — LOSARTAN POTASSIUM 50 MG/1
TABLET ORAL
Qty: 90 TABLET | Refills: 1 | Status: SHIPPED | OUTPATIENT
Start: 2022-07-11 | End: 2023-01-05

## 2022-07-11 RX ORDER — KETOCONAZOLE 20 MG/ML
SHAMPOO TOPICAL DAILY PRN
Qty: 120 ML | Refills: 1 | Status: SHIPPED | OUTPATIENT
Start: 2022-07-11

## 2022-07-11 NOTE — TELEPHONE ENCOUNTER
Ketoconazole Shampoo refill request routed to Provider as it is not on the RN refill protocol.  Thank you.  Torrey Tavarez, RN

## 2022-07-31 ENCOUNTER — HEALTH MAINTENANCE LETTER (OUTPATIENT)
Age: 72
End: 2022-07-31

## 2022-08-30 ENCOUNTER — OFFICE VISIT (OUTPATIENT)
Dept: FAMILY MEDICINE | Facility: CLINIC | Age: 72
End: 2022-08-30
Payer: COMMERCIAL

## 2022-08-30 VITALS
RESPIRATION RATE: 18 BRPM | WEIGHT: 148.1 LBS | TEMPERATURE: 98.6 F | BODY MASS INDEX: 25.29 KG/M2 | SYSTOLIC BLOOD PRESSURE: 134 MMHG | OXYGEN SATURATION: 97 % | DIASTOLIC BLOOD PRESSURE: 80 MMHG | HEIGHT: 64 IN | HEART RATE: 71 BPM

## 2022-08-30 DIAGNOSIS — L23.7 CONTACT DERMATITIS DUE TO POISON IVY: Primary | ICD-10-CM

## 2022-08-30 PROCEDURE — 99213 OFFICE O/P EST LOW 20 MIN: CPT | Performed by: NURSE PRACTITIONER

## 2022-08-30 RX ORDER — CLOBETASOL PROPIONATE 0.5 MG/G
CREAM TOPICAL 2 TIMES DAILY
Qty: 60 G | Refills: 1 | Status: SHIPPED | OUTPATIENT
Start: 2022-08-30

## 2022-08-30 RX ORDER — METHYLPREDNISOLONE 4 MG
TABLET, DOSE PACK ORAL
Qty: 21 TABLET | Refills: 0 | Status: SHIPPED | OUTPATIENT
Start: 2022-08-30 | End: 2023-03-07

## 2022-08-30 ASSESSMENT — PAIN SCALES - GENERAL: PAINLEVEL: SEVERE PAIN (6)

## 2022-08-30 NOTE — PROGRESS NOTES
"  Assessment & Plan     Contact dermatitis due to poison ivy  Has multiple clusters of poison ivy throughout bilateral legs that are in various stages of healing.  It has been over 2 weeks since she was exposed.  She is going to try the steroid cream first to see if there is any improvement in symptoms if not then she will try the steroid prednisone.  - clobetasol (TEMOVATE) 0.05 % external cream; Apply topically 2 times daily On affected areas on legs for 2 weeks then stop for 1 week  - methylPREDNISolone (MEDROL DOSEPAK) 4 MG tablet therapy pack; Follow Package Directions         BMI:   Estimated body mass index is 25.42 kg/m  as calculated from the following:    Height as of this encounter: 1.626 m (5' 4\").    Weight as of this encounter: 67.2 kg (148 lb 1.6 oz).       Return in about 1 week (around 9/6/2022), or if symptoms worsen or fail to improve, for Follow up.    ABA Melgar CNP [facility] SUMAN Jones is a 72 year old, presenting for the following health issues:  No chief complaint on file.      History of Present Illness       Reason for visit:  Blisters and itching  Symptom onset:  1-2 weeks ago  Symptoms include:  Itching and blisters  Symptom intensity:  Severe  Symptom progression:  Worsening  Had these symptoms before:  No  What makes it better:  Caladryl helps for a time    She eats 2-3 servings of fruits and vegetables daily.She consumes 1 sweetened beverage(s) daily.She exercises with enough effort to increase her heart rate 10 to 19 minutes per day.  She exercises with enough effort to increase her heart rate 3 or less days per week.   She is taking medications regularly.       Rash  Onset/Duration: 1-2 weeks  Description  Location: generalized on body   Character: raised, painful, burning, draining, red  Itching: moderate  Intensity:  moderate  Progression of Symptoms:  worsening and constant  Accompanying signs and symptoms:   Fever: No  Body aches or " "joint pain: No  Sore throat symptoms: No  Recent cold symptoms: No  History:           Previous episodes of similar rash: yes feels and looks like poison ivy or sumac  New exposures:  None  Recent travel: No  Exposure to similar rash: No  Precipitating or alleviating factors: None  Therapies tried and outcome: calamine lotion seemed to help a little, rubbing alcohol     Symptoms started couple of weeks ago after she walked into a ditch to pick some wild flowers.  She developed lesions on her right ankle initially.  But since then she is also had scattered lesions on bilateral legs that start as blistering linear vesicles.  She complains of itching particularly worse at nighttime.  She has used Caladryl to help with the itching and the vesicles.  But she continues to see new lesions that have popped up and are continuing to cause her itching.    Review of Systems   Constitutional, HEENT, cardiovascular, pulmonary, gi and gu systems are negative, except as otherwise noted.      Objective    /80   Pulse 71   Temp 98.6  F (37  C) (Tympanic)   Resp 18   Ht 1.626 m (5' 4\")   Wt 67.2 kg (148 lb 1.6 oz)   LMP  (LMP Unknown)   SpO2 97%   BMI 25.42 kg/m    Body mass index is 25.42 kg/m .  Physical Exam  Constitutional:       Appearance: Normal appearance.   Skin:     General: Skin is warm and dry.      Findings: Rash present.      Comments: Erythematous vesicles some that are in a clustered pattern or a linear pattern on bilateral legs.  She has some clusters that are already healed from the vesicles and just are erythematous with some hemorrhagic crusting.   Neurological:      Mental Status: She is alert and oriented to person, place, and time.   Psychiatric:         Mood and Affect: Mood normal.         Behavior: Behavior normal.         Thought Content: Thought content normal.         Judgment: Judgment normal.                    .  ..  "

## 2022-09-06 ENCOUNTER — NURSE TRIAGE (OUTPATIENT)
Dept: FAMILY MEDICINE | Facility: CLINIC | Age: 72
End: 2022-09-06

## 2022-09-06 NOTE — TELEPHONE ENCOUNTER
"    Reason for Disposition    Localized hives    Additional Information    Negative: Difficulty breathing or wheezing now    Negative: Rapid onset of swollen tongue    Negative: Rapid onset of hoarseness or cough    Negative: Very weak (can't stand)    Negative: Difficult to awaken or acting confused (e.g., disoriented, slurred speech)    Negative: Life-threatening reaction (anaphylaxis) in the past to similar substance (e.g., food, insect bite/sting, chemical, etc.) and < 2 hours since exposure    Negative: Sounds like a life-threatening emergency to the triager    Negative: Bee, wasp, or yellow jacket sting within last 24 hours    Negative: Taking a new medicine now or within last 3 days  (Exception: Antihistamine, decongestant or other OTC cough/cold medicines.)    Negative: Doesn't match the SYMPTOMS of hives    Negative: Swollen tongue    Negative: Widespread hives, itching, or facial swelling and onset < 2 hours of exposure to high-risk allergen (e.g., 1st dose of antibiotic, nuts, sting)    Negative: Patient sounds very sick or weak to the triager    Negative: MODERATE-SEVERE hives persist (i.e., hives interfere with normal activities or work) and taking antihistamine (e.g., Benadryl, Claritin) > 24 hours    Negative: Hives have become worse and taking oral steroids (e.g., prednisone) > 24 hours    Negative: Abdominal pain    Negative: Joint swelling    Negative: Fever    Negative: Patient wants to be seen    Negative: Hives persist > 1 week    Negative: Widespread hives and onset > 2 hours of exposure to high-risk allergen (e.g., peanuts, tree nuts, fish, or shellfish)    Negative: Hives from food reaction and diagnosis never confirmed by a doctor (or NP/PA)    Negative: Hives has occurred 3 or more times in the last year and the cause is not known    Negative: Hives from food reaction    Answer Assessment - Initial Assessment Questions  1. APPEARANCE: \"What does the rash look like?\"       Raised areas, non " "red less than dime size  2. LOCATION: \"Where is the rash located?\"       Left arm, upper only  3. NUMBER: \"How many hives are there?\"       More than 20  4. SIZE: \"How big are the hives?\" (inches, cm, compare to coins) \"Do they all look the same or is there lots of variation in shape and size?\"       Looks like little blisters  5. ONSET: \"When did the hives begin?\" (Hours or days ago)       This morning  6. ITCHING: \"Does it itch?\" If Yes, ask: \"How bad is the itch?\"     - MILD: doesn't interfere with normal activities    - MODERATE-SEVERE: interferes with work, school, sleep, or other activities       Do not itch  7. RECURRENT PROBLEM: \"Have you had hives before?\" If Yes, ask: \"When was the last time?\" and \"What happened that time?\"       Unsure if hives. Had poison ivy on legs 3 weeks ago  8. TRIGGERS: \"Were you exposed to any new food, plant, cosmetic product or animal just before the hives began?\"      No new exposures. Washed bed clothes with poison ivy  9. OTHER SYMPTOMS: \"Do you have any other symptoms?\" (e.g., fever, tongue swelling, difficulty breathing, abdominal pain)      No painful, no problems with breathing, no abdominal pain  10. PREGNANCY: \"Is there any chance you are pregnant?\" \"When was your last menstrual period?\"        no    Protocols used: HIVES-A-OH    Patient describes areas like welts. Possible hives. Patient states grandson has also developed the same looking hives. No known exposures to cause this. She will try antihistamine. She also was given a prescription for methylprednisolone when diagnoses with poison ivy 3 weeks ago. She did not start this medication but states she is going to pick it up from the pharmacy today.    Bella Betancourt RN     "

## 2022-09-26 ENCOUNTER — TELEPHONE (OUTPATIENT)
Dept: FAMILY MEDICINE | Facility: CLINIC | Age: 72
End: 2022-09-26

## 2022-09-26 NOTE — TELEPHONE ENCOUNTER
Patient Quality Outreach    Patient is due for the following:   Physical Annual Wellness Visit    Next Steps:   Schedule a Annual Wellness Visit    Type of outreach:    Sent [employer] message.      Questions for provider review:    None     Brannon Kerr

## 2022-12-05 ENCOUNTER — HOSPITAL ENCOUNTER (OUTPATIENT)
Dept: MAMMOGRAPHY | Facility: CLINIC | Age: 72
Discharge: HOME | End: 2022-12-05
Attending: PHYSICIAN ASSISTANT | Admitting: PHYSICIAN ASSISTANT
Payer: COMMERCIAL

## 2022-12-05 DIAGNOSIS — Z12.31 VISIT FOR SCREENING MAMMOGRAM: ICD-10-CM

## 2022-12-05 PROCEDURE — 77067 SCR MAMMO BI INCL CAD: CPT

## 2022-12-19 ENCOUNTER — OFFICE VISIT (OUTPATIENT)
Dept: DERMATOLOGY | Facility: CLINIC | Age: 72
End: 2022-12-19
Payer: COMMERCIAL

## 2022-12-19 DIAGNOSIS — L81.4 LENTIGO: ICD-10-CM

## 2022-12-19 DIAGNOSIS — D22.9 NEVUS: Primary | ICD-10-CM

## 2022-12-19 DIAGNOSIS — D18.01 ANGIOMA OF SKIN: ICD-10-CM

## 2022-12-19 DIAGNOSIS — L82.1 SEBORRHEIC KERATOSIS: ICD-10-CM

## 2022-12-19 DIAGNOSIS — L82.0 INFLAMED SEBORRHEIC KERATOSIS: ICD-10-CM

## 2022-12-19 PROCEDURE — 99213 OFFICE O/P EST LOW 20 MIN: CPT | Mod: 25 | Performed by: PHYSICIAN ASSISTANT

## 2022-12-19 PROCEDURE — 17111 DESTRUCTION B9 LESIONS 15/>: CPT | Performed by: PHYSICIAN ASSISTANT

## 2022-12-19 ASSESSMENT — PAIN SCALES - GENERAL: PAINLEVEL: NO PAIN (0)

## 2022-12-19 NOTE — LETTER
12/19/2022         RE: Karen Hernandez  [address] [place] [id number]        Dear Colleague,    Thank you for referring your patient, Karen Hernandez, to the [facility]. Please see a copy of my visit note below.    HPI:   Chief complaints: Karen Hernandez is a 72 year old female who presents for Full skin cancer screening to rule out skin cancer   Last Skin Exam: 2 years ago      1st Baseline: no  Personal HX of Skin Cancer: no   Personal HX of Malignant Melanoma: no   Family HX of Skin Cancer / Malignant Melanoma: no  Personal HX of Atypical Moles:   no  Risk factors: lots of sun exposure; was a  in the past; history of blistering sunburns in the past.   New / Changing lesions:yes irritated areas on the back, neck that get caught on clothing and bleed  Social History:   On review of systems, there are no further skin complaints, patient is feeling otherwise well.  See patient intake sheet.  ROS of the following were done and are negative: Constitutional, Eyes, Ears, Nose,   Mouth, Throat, Cardiovascular, Respiratory, GI, Genitourinary, Musculoskeletal,   Psychiatric, Endocrine, Allergic/Immunologic.    PHYSICAL EXAM:   LMP  (LMP Unknown)   Skin exam performed as follows: Type 2 skin. Mood appropriate  Alert and Oriented X 3. Well developed, well nourished in no distress.  General appearance: Normal  Head including face: Normal  Eyes: conjunctiva and lids: Normal  Mouth: Lips, teeth, gums: Normal  Neck: Normal  Chest-breast/axillae: Normal  Back: Normal  Spleen and liver: Normal  Cardiovascular: Exam of peripheral vascular system by observation for swelling, varicosities, edema: Normal  Genitalia: groin, buttocks: Normal  Extremities: digits/nails (clubbing): Normal  Eccrine and Apocrine glands: Normal  Right upper extremity: Normal  Left upper extremity: Normal  Right lower extremity: Normal  Left lower extremity: Normal  Skin: Scalp and body  hair: See below    Pt deferred exam of breasts, groin, buttocks: No    Other physical findings:  1. Multiple pigmented macules on extremities and trunk  2. Multiple pigmented macules on face, trunk and extremities  3. Multiple vascular papules on trunk, arms and legs  4. Multiple scattered keratotic plaques  5. Inflamed keratotic papules on the left flank x 15, neck x 4, right flank x 5         Except as noted above, no other signs of skin cancer or melanoma.     ASSESSMENT/PLAN:   Benign Full skin cancer screening today. . Patient with history of none  Advised on monthly self exams and 1 year  Patient Education: Appropriate brochures given.    1. Multiple benign appearing nevi on arms, legs and trunk. Discussed ABCDEs of melanoma and sunscreen.   2. Multiple lentigos on arms, legs and trunk. Advised benign, no treatment needed.  3. Multiple scattered angiomas. Advised benign, no treatment needed.   4. Seborrheic keratosis on arms, legs and trunk. Advised benign, no treatment needed.  5. Inflamed seborrheic keratosis on the left flank x 15, neck x 4, right flank x 5. As physically tender cryosurgery performed. Advised on post op care.                 Follow-up: yearly FSE/PRN sooner    1.) Patient was asked about new and changing moles. YES  2.) Patient received a complete physical skin examination: YES  3.) Patient was counseled to perform a monthly self skin examination: YES  Scribed By: Leona Cage, MS, PAJOHANNY          Again, thank you for allowing me to participate in the care of your patient.        Sincerely,        Leona Cage PA-C

## 2022-12-19 NOTE — PROGRESS NOTES
HPI:   Chief complaints: Karen Hernandez is a 72 year old female who presents for Full skin cancer screening to rule out skin cancer   Last Skin Exam: 2 years ago      1st Baseline: no  Personal HX of Skin Cancer: no   Personal HX of Malignant Melanoma: no   Family HX of Skin Cancer / Malignant Melanoma: no  Personal HX of Atypical Moles:   no  Risk factors: lots of sun exposure; was a  in the past; history of blistering sunburns in the past.   New / Changing lesions:yes irritated areas on the back, neck that get caught on clothing and bleed  Social History:   On review of systems, there are no further skin complaints, patient is feeling otherwise well.  See patient intake sheet.  ROS of the following were done and are negative: Constitutional, Eyes, Ears, Nose,   Mouth, Throat, Cardiovascular, Respiratory, GI, Genitourinary, Musculoskeletal,   Psychiatric, Endocrine, Allergic/Immunologic.    PHYSICAL EXAM:   LMP  (LMP Unknown)   Skin exam performed as follows: Type 2 skin. Mood appropriate  Alert and Oriented X 3. Well developed, well nourished in no distress.  General appearance: Normal  Head including face: Normal  Eyes: conjunctiva and lids: Normal  Mouth: Lips, teeth, gums: Normal  Neck: Normal  Chest-breast/axillae: Normal  Back: Normal  Spleen and liver: Normal  Cardiovascular: Exam of peripheral vascular system by observation for swelling, varicosities, edema: Normal  Genitalia: groin, buttocks: Normal  Extremities: digits/nails (clubbing): Normal  Eccrine and Apocrine glands: Normal  Right upper extremity: Normal  Left upper extremity: Normal  Right lower extremity: Normal  Left lower extremity: Normal  Skin: Scalp and body hair: See below    Pt deferred exam of breasts, groin, buttocks: No    Other physical findings:  1. Multiple pigmented macules on extremities and trunk  2. Multiple pigmented macules on face, trunk and extremities  3. Multiple vascular papules on trunk, arms and  legs  4. Multiple scattered keratotic plaques  5. Inflamed keratotic papules on the left flank x 15, neck x 4, right flank x 5         Except as noted above, no other signs of skin cancer or melanoma.     ASSESSMENT/PLAN:   Benign Full skin cancer screening today. . Patient with history of none  Advised on monthly self exams and 1 year  Patient Education: Appropriate brochures given.    1. Multiple benign appearing nevi on arms, legs and trunk. Discussed ABCDEs of melanoma and sunscreen.   2. Multiple lentigos on arms, legs and trunk. Advised benign, no treatment needed.  3. Multiple scattered angiomas. Advised benign, no treatment needed.   4. Seborrheic keratosis on arms, legs and trunk. Advised benign, no treatment needed.  5. Inflamed seborrheic keratosis on the left flank x 15, neck x 4, right flank x 5. As physically tender cryosurgery performed. Advised on post op care.                 Follow-up: yearly FSE/PRN sooner    1.) Patient was asked about new and changing moles. YES  2.) Patient received a complete physical skin examination: YES  3.) Patient was counseled to perform a monthly self skin examination: YES  Scribed By: Leona Cage, MS, PA-C

## 2023-01-18 DIAGNOSIS — E78.5 HYPERLIPIDEMIA LDL GOAL <130: ICD-10-CM

## 2023-01-19 RX ORDER — ATORVASTATIN CALCIUM 20 MG/1
TABLET, FILM COATED ORAL
Qty: 90 TABLET | Refills: 3 | Status: SHIPPED | OUTPATIENT
Start: 2023-01-19 | End: 2024-01-02

## 2023-01-19 NOTE — TELEPHONE ENCOUNTER
Routing refill request to provider for review/approval because:  Labs not current:  Lipids due    Samuel Hoskins RN

## 2023-03-07 ENCOUNTER — OFFICE VISIT (OUTPATIENT)
Dept: FAMILY MEDICINE | Facility: CLINIC | Age: 73
End: 2023-03-07
Payer: COMMERCIAL

## 2023-03-07 VITALS
OXYGEN SATURATION: 98 % | WEIGHT: 147.6 LBS | SYSTOLIC BLOOD PRESSURE: 138 MMHG | BODY MASS INDEX: 26.15 KG/M2 | HEART RATE: 83 BPM | HEIGHT: 63 IN | DIASTOLIC BLOOD PRESSURE: 88 MMHG | TEMPERATURE: 97.7 F

## 2023-03-07 DIAGNOSIS — Z12.11 SCREEN FOR COLON CANCER: Primary | ICD-10-CM

## 2023-03-07 DIAGNOSIS — K21.00 GASTROESOPHAGEAL REFLUX DISEASE WITH ESOPHAGITIS WITHOUT HEMORRHAGE: ICD-10-CM

## 2023-03-07 DIAGNOSIS — E78.5 HYPERLIPIDEMIA LDL GOAL <130: ICD-10-CM

## 2023-03-07 DIAGNOSIS — I10 ESSENTIAL HYPERTENSION WITH GOAL BLOOD PRESSURE LESS THAN 140/90: ICD-10-CM

## 2023-03-07 DIAGNOSIS — Z00.00 ENCOUNTER FOR MEDICARE ANNUAL WELLNESS EXAM: ICD-10-CM

## 2023-03-07 LAB
ANION GAP SERPL CALCULATED.3IONS-SCNC: 9 MMOL/L (ref 7–15)
BASOPHILS # BLD AUTO: 0 10E3/UL (ref 0–0.2)
BASOPHILS NFR BLD AUTO: 1 %
BUN SERPL-MCNC: 10.3 MG/DL (ref 8–23)
CALCIUM SERPL-MCNC: 9.8 MG/DL (ref 8.8–10.2)
CHLORIDE SERPL-SCNC: 105 MMOL/L (ref 98–107)
CHOLEST SERPL-MCNC: 192 MG/DL
CREAT SERPL-MCNC: 0.81 MG/DL (ref 0.51–0.95)
DEPRECATED HCO3 PLAS-SCNC: 29 MMOL/L (ref 22–29)
EOSINOPHIL # BLD AUTO: 0.5 10E3/UL (ref 0–0.7)
EOSINOPHIL NFR BLD AUTO: 8 %
ERYTHROCYTE [DISTWIDTH] IN BLOOD BY AUTOMATED COUNT: 12.9 % (ref 10–15)
GFR SERPL CREATININE-BSD FRML MDRD: 77 ML/MIN/1.73M2
GLUCOSE SERPL-MCNC: 117 MG/DL (ref 70–99)
HCT VFR BLD AUTO: 39 % (ref 35–47)
HDLC SERPL-MCNC: 79 MG/DL
HGB BLD-MCNC: 12.6 G/DL (ref 11.7–15.7)
LDLC SERPL CALC-MCNC: 96 MG/DL
LYMPHOCYTES # BLD AUTO: 1.5 10E3/UL (ref 0.8–5.3)
LYMPHOCYTES NFR BLD AUTO: 25 %
MCH RBC QN AUTO: 31 PG (ref 26.5–33)
MCHC RBC AUTO-ENTMCNC: 32.3 G/DL (ref 31.5–36.5)
MCV RBC AUTO: 96 FL (ref 78–100)
MONOCYTES # BLD AUTO: 0.6 10E3/UL (ref 0–1.3)
MONOCYTES NFR BLD AUTO: 10 %
NEUTROPHILS # BLD AUTO: 3.4 10E3/UL (ref 1.6–8.3)
NEUTROPHILS NFR BLD AUTO: 57 %
NONHDLC SERPL-MCNC: 113 MG/DL
PLATELET # BLD AUTO: 266 10E3/UL (ref 150–450)
POTASSIUM SERPL-SCNC: 4.1 MMOL/L (ref 3.4–5.3)
RBC # BLD AUTO: 4.06 10E6/UL (ref 3.8–5.2)
SODIUM SERPL-SCNC: 143 MMOL/L (ref 136–145)
TRIGL SERPL-MCNC: 84 MG/DL
WBC # BLD AUTO: 5.9 10E3/UL (ref 4–11)

## 2023-03-07 PROCEDURE — 80048 BASIC METABOLIC PNL TOTAL CA: CPT | Performed by: PHYSICIAN ASSISTANT

## 2023-03-07 PROCEDURE — 85025 COMPLETE CBC W/AUTO DIFF WBC: CPT | Performed by: PHYSICIAN ASSISTANT

## 2023-03-07 PROCEDURE — G0439 PPPS, SUBSEQ VISIT: HCPCS | Performed by: PHYSICIAN ASSISTANT

## 2023-03-07 PROCEDURE — 36415 COLL VENOUS BLD VENIPUNCTURE: CPT | Performed by: PHYSICIAN ASSISTANT

## 2023-03-07 PROCEDURE — 80061 LIPID PANEL: CPT | Performed by: PHYSICIAN ASSISTANT

## 2023-03-07 RX ORDER — PANTOPRAZOLE SODIUM 40 MG/1
40 TABLET, DELAYED RELEASE ORAL DAILY
Qty: 90 TABLET | Refills: 3 | Status: SHIPPED | OUTPATIENT
Start: 2023-03-07 | End: 2024-04-29

## 2023-03-07 RX ORDER — LOSARTAN POTASSIUM 50 MG/1
50 TABLET ORAL DAILY
Qty: 90 TABLET | Refills: 3 | Status: SHIPPED | OUTPATIENT
Start: 2023-03-07 | End: 2024-04-08

## 2023-03-07 ASSESSMENT — ENCOUNTER SYMPTOMS
COUGH: 0
FEVER: 0
CONSTIPATION: 0
HEARTBURN: 0
PARESTHESIAS: 0
ARTHRALGIAS: 0
HEMATOCHEZIA: 0
MYALGIAS: 0
BREAST MASS: 0
PALPITATIONS: 0
HEMATURIA: 0
FREQUENCY: 0
NERVOUS/ANXIOUS: 0
JOINT SWELLING: 0
SHORTNESS OF BREATH: 0
DYSURIA: 0
NAUSEA: 0
EYE PAIN: 0
SORE THROAT: 0
WEAKNESS: 0
HEADACHES: 0
DIARRHEA: 0
CHILLS: 0
ABDOMINAL PAIN: 0
DIZZINESS: 0

## 2023-03-07 ASSESSMENT — ACTIVITIES OF DAILY LIVING (ADL): CURRENT_FUNCTION: NO ASSISTANCE NEEDED

## 2023-03-07 NOTE — PROGRESS NOTES
"SUBJECTIVE:   Karen is a 72 year old who presents for Preventive Visit.  Patient has been advised of split billing requirements and indicates understanding: Yes  Are you in the first 12 months of your Medicare coverage?  No    Healthy Habits:     In general, how would you rate your overall health?  Excellent    Frequency of exercise:  2-3 days/week    Duration of exercise:  15-30 minutes    Do you usually eat at least 4 servings of fruit and vegetables a day, include whole grains    & fiber and avoid regularly eating high fat or \"junk\" foods?  Yes    Taking medications regularly:  Yes    Medication side effects:  None    Ability to successfully perform activities of daily living:  No assistance needed    Home Safety:  Lack of grab bars in the bathroom    Hearing Impairment:  Difficulty following a conversation in a noisy restaurant or crowded room and difficulty understanding soft or whispered speech    In the past 6 months, have you been bothered by leaking of urine?  No    In general, how would you rate your overall mental or emotional health?  Excellent      PHQ-2 Total Score: 0    Additional concerns today:  No      Have you ever done Advance Care Planning? (For example, a Health Directive, POLST, or a discussion with a medical provider or your loved ones about your wishes):        Fall risk  Fallen 2 or more times in the past year?: No  Any fall with injury in the past year?: No    Cognitive Screening   1) Repeat 3 items (Leader, Season, Table)    2) Clock draw: NORMAL  3) 3 item recall: Recalls 3 objects  Results: 3 items recalled: COGNITIVE IMPAIRMENT LESS LIKELY    Mini-CogTM Copyright ESTEVAN Kilgore. Licensed by the author for use in [facility]; reprinted with permission (cory@.[facility]). All rights reserved.      Do you have sleep apnea, excessive snoring or daytime drowsiness?: no    Reviewed and updated as needed this visit by clinical staff    Allergies  Meds              Reviewed and updated as " needed this visit by Provider                 Social History     Tobacco Use     Smoking status: Never     Smokeless tobacco: Never   Substance Use Topics     Alcohol use: Yes     Comment: Social-- 6/wk         Alcohol Use 3/7/2023   Prescreen: >3 drinks/day or >7 drinks/week? No               Current providers sharing in care for this patient include:   Patient Care Team:  Delphine Aguilar PA-C as PCP - General (Family Medicine)  Delphine Aguilar PA-C as Assigned PCP  Leona Cage PA-C as Physician Assistant (Dermatology)  Leona Cage PA-C as Assigned Surgical Provider    The following health maintenance items are reviewed in Epic and correct as of today:  Health Maintenance   Topic Date Due     COLORECTAL CANCER SCREENING  01/04/2022     MEDICARE ANNUAL WELLNESS VISIT  05/25/2022     DTAP/TDAP/TD IMMUNIZATION (4 - Td or Tdap) 08/08/2023     ANNUAL REVIEW OF HM ORDERS  08/30/2023     FALL RISK ASSESSMENT  03/07/2024     MAMMO SCREENING  12/05/2024     DEXA  01/20/2025     LIPID  05/25/2026     ADVANCE CARE PLANNING  05/25/2026     HEPATITIS C SCREENING  Completed     PHQ-2 (once per calendar year)  Completed     INFLUENZA VACCINE  Completed     Pneumococcal Vaccine: 65+ Years  Completed     ZOSTER IMMUNIZATION  Completed     COVID-19 Vaccine  Completed     IPV IMMUNIZATION  Aged Out     MENINGITIS IMMUNIZATION  Aged Out     BP Readings from Last 3 Encounters:   03/07/23 (!) 168/98   08/30/22 134/80   11/23/21 132/82    Wt Readings from Last 3 Encounters:   03/07/23 67 kg (147 lb 9.6 oz)   08/30/22 67.2 kg (148 lb 1.6 oz)   11/23/21 68 kg (150 lb)                   Review of Systems   Constitutional: Negative for chills and fever.   HENT: Negative for congestion, ear pain, hearing loss and sore throat.    Eyes: Negative for pain and visual disturbance.   Respiratory: Negative for cough and shortness of breath.    Cardiovascular: Negative for chest pain, palpitations and peripheral  "edema.   Gastrointestinal: Negative for abdominal pain, constipation, diarrhea, heartburn, hematochezia and nausea.   Breasts:  Negative for tenderness, breast mass and discharge.   Genitourinary: Negative for dysuria, frequency, genital sores, hematuria, pelvic pain, urgency, vaginal bleeding and vaginal discharge.   Musculoskeletal: Negative for arthralgias, joint swelling and myalgias.   Skin: Negative for rash.   Neurological: Negative for dizziness, weakness, headaches and paresthesias.   Psychiatric/Behavioral: Negative for mood changes. The patient is not nervous/anxious.          OBJECTIVE:   BP (!) 168/98   Pulse 83   Temp 97.7  F (36.5  C) (Tympanic)   Ht 1.607 m (5' 3.25\")   Wt 67 kg (147 lb 9.6 oz)   LMP  (LMP Unknown)   SpO2 98%   BMI 25.94 kg/m   Estimated body mass index is 25.94 kg/m  as calculated from the following:    Height as of this encounter: 1.607 m (5' 3.25\").    Weight as of this encounter: 67 kg (147 lb 9.6 oz).  Physical Exam  GENERAL APPEARANCE: healthy, alert and no distress  EYES: Eyes grossly normal to inspection, PERRL and conjunctivae and sclerae normal  HENT: ear canals and TM's normal, nose and mouth without ulcers or lesions, oropharynx clear and oral mucous membranes moist  NECK: no adenopathy, no asymmetry, masses, or scars and thyroid normal to palpation  RESP: lungs clear to auscultation - no rales, rhonchi or wheezes  CV: regular rate and rhythm, normal S1 S2, no S3 or S4, no murmur, click or rub, no peripheral edema and peripheral pulses strong  ABDOMEN: soft, nontender, no hepatosplenomegaly, no masses and bowel sounds normal  MS: no musculoskeletal defects are noted and gait is age appropriate without ataxia  SKIN: no suspicious lesions or rashes  NEURO: Normal strength and tone, sensory exam grossly normal, mentation intact and speech normal  PSYCH: mentation appears normal and affect normal/bright    Diagnostic Test Results:  pending    ASSESSMENT / PLAN: " "    (Z00.00) Encounter for Medicare annual wellness exam  Comment:   Plan:     (Z12.11) Screen for colon cancer  (primary encounter diagnosis)  Comment:   Plan: Colonoscopy Screening  Referral    (I10) Essential hypertension with goal blood pressure less than 140/90  Comment:   Plan: losartan (COZAAR) 50 MG tablet, Basic metabolic        panel  (Ca, Cl, CO2, Creat, Gluc, K, Na, BUN)            (K21.00) Gastroesophageal reflux disease with esophagitis without hemorrhage  Comment:   Plan: pantoprazole (PROTONIX) 40 MG EC tablet, CBC         with platelets and differential            (E78.5) Hyperlipidemia LDL goal <130  Comment:   Plan: Lipid panel reflex to direct LDL Fasting              Patient has been advised of split billing requirements and indicates understanding: Yes      COUNSELING:  Reviewed preventive health counseling, as reflected in patient instructions      BMI:   Estimated body mass index is 25.94 kg/m  as calculated from the following:    Height as of this encounter: 1.607 m (5' 3.25\").    Weight as of this encounter: 67 kg (147 lb 9.6 oz).         She reports that she has never smoked. She has never used smokeless tobacco.      Appropriate preventive services were discussed with this patient, including applicable screening as appropriate for cardiovascular disease, diabetes, osteopenia/osteoporosis, and glaucoma.  As appropriate for age/gender, discussed screening for colorectal cancer, prostate cancer, breast cancer, and cervical cancer. Checklist reviewing preventive services available has been given to the patient.    Reviewed patients plan of care and provided an AVS. The Basic Care Plan (routine screening as documented in Health Maintenance) for Karen meets the Care Plan requirement. This Care Plan has been established and reviewed with the Patient.      Delphine Aguilar PA-C  [facility]    Identified Health Risks:  "

## 2023-03-07 NOTE — NURSING NOTE
"Initial BP (!) 168/98   Pulse 83   Temp 97.7  F (36.5  C) (Tympanic)   Ht 1.607 m (5' 3.25\")   Wt 67 kg (147 lb 9.6 oz)   LMP  (LMP Unknown)   SpO2 98%   BMI 25.94 kg/m   Estimated body mass index is 25.94 kg/m  as calculated from the following:    Height as of this encounter: 1.607 m (5' 3.25\").    Weight as of this encounter: 67 kg (147 lb 9.6 oz). .      "

## 2023-03-07 NOTE — PATIENT INSTRUCTIONS
Patient Education   Personalized Prevention Plan  You are due for the preventive services outlined below.  Your care team is available to assist you in scheduling these services.  If you have already completed any of these items, please share that information with your care team to update in your medical record.  Health Maintenance Due   Topic Date Due     Colorectal Cancer Screening  01/04/2022     Annual Wellness Visit  05/25/2022     PHQ-2 (once per calendar year)  01/01/2023     Preventive Health Recommendations    See your health care provider every year to    Review health changes.     Discuss preventive care.      Review your medicines if your doctor has prescribed any.    You no longer need a yearly Pap test unless you've had an abnormal Pap test in the past 10 years. If you have vaginal symptoms, such as bleeding or discharge, be sure to talk with your provider about a Pap test.    Every 1 to 2 years, have a mammogram.  If you are over 69, talk with your health care provider about whether or not you want to continue having screening mammograms.    Every 10 years, have a colonoscopy. Or, have a yearly FIT test (stool test). These exams will check for colon cancer.     Have a cholesterol test every 5 years, or more often if your doctor advises it.     Have a diabetes test (fasting glucose) every three years. If you are at risk for diabetes, you should have this test more often.     At age 65, have a bone density scan (DEXA) to check for osteoporosis (brittle bone disease).    Shots:    Get a flu shot each year.    Get a tetanus shot every 10 years.    Talk to your doctor about your pneumonia vaccines. There are now two you should receive - Pneumovax (PPSV 23) and Prevnar (PCV 13).    Talk to your pharmacist about the shingles vaccine.    Talk to your doctor about the hepatitis B vaccine.    Nutrition:     Eat at least 5 servings of fruits and vegetables each day.    Eat whole-grain bread, whole-wheat pasta  and brown rice instead of white grains and rice.    Get adequate Calcium and Vitamin D.     Lifestyle    Exercise at least 150 minutes a week (30 minutes a day, 5 days a week). This will help you control your weight and prevent disease.    Limit alcohol to one drink per day.    No smoking.     Wear sunscreen to prevent skin cancer.     See your dentist twice a year for an exam and cleaning.    See your eye doctor every 1 to 2 years to screen for conditions such as glaucoma, macular degeneration and cataracts.    Personalized Prevention Plan  You are due for the preventive services outlined below.  Your care team is available to assist you in scheduling these services.  If you have already completed any of these items, please share that information with your care team to update in your medical record.  Health Maintenance   Topic Date Due     COLORECTAL CANCER SCREENING  01/04/2022     MEDICARE ANNUAL WELLNESS VISIT  05/25/2022     PHQ-2 (once per calendar year)  01/01/2023     DTAP/TDAP/TD IMMUNIZATION (4 - Td or Tdap) 08/08/2023     ANNUAL REVIEW OF HM ORDERS  08/30/2023     FALL RISK ASSESSMENT  08/30/2023     MAMMO SCREENING  12/05/2024     DEXA  01/20/2025     LIPID  05/25/2026     ADVANCE CARE PLANNING  05/25/2026     HEPATITIS C SCREENING  Completed     INFLUENZA VACCINE  Completed     Pneumococcal Vaccine: 65+ Years  Completed     ZOSTER IMMUNIZATION  Completed     COVID-19 Vaccine  Completed     IPV IMMUNIZATION  Aged Out     MENINGITIS IMMUNIZATION  Aged Out

## 2023-03-27 ENCOUNTER — TELEPHONE (OUTPATIENT)
Dept: SURGERY | Facility: CLINIC | Age: 73
End: 2023-03-27
Payer: COMMERCIAL

## 2023-03-27 NOTE — TELEPHONE ENCOUNTER
Screening Questions  BLUE  KIND OF PREP RED  LOCATION [review exclusion criteria] GREEN  SEDATION TYPE        y Are you active on mychart?      gronning  Ordering/Referring Provider?        [employer] What type of coverage do you have?      n Have you had a positive covid test in the last 14 days?     25.94 1. BMI  [BMI 40+ - review exclusion criteria]    y  2. Are you able to give consent for your medical care? [IF NO,RN REVIEW]          n  3. Are you taking any prescription pain medications on a routine schedule   (ex narcotics: oxycodone, roxicodone, oxycontin,  and percocet)? [RN Review]        n  3a. EXTENDED PREP What kind of prescription?     n 4. Do you have any chemical dependencies such as alcohol, street drugs, or methadone?        **If yes 3- 5 , please schedule with MAC sedation.**          IF YES TO ANY 6 - 10 - HOSPITAL SETTING ONLY.     n 6.   Do you need assistance transferring?     n 7.   Have you had a heart or lung transplant?    n 8.   Are you currently on dialysis?   n 9.   Do you use daily home oxygen?   n 10. Do you take nitroglycerin?   10a. n If yes, how often?     11. [FEMALES]  n Are you currently pregnant?    11a. n If yes, how many weeks? [ Greater than 12 weeks, OR NEEDED]    n 12. Do you have Pulmonary Hypertension? *NEED PAC APPT AT [facility] w/ MAC*     n 13. [review exclusion criteria]  Do you have any implantable devices in your body (pacemaker, defib, LVAD)?    n 14. In the past 6 months, have you had any heart related issues including cardiomyopathy or heart attack?     14a. n If yes, did it require cardiac stenting if so when?     n 15. Have you had a stroke or Transient ischemic attack (TIA - aka  mini stroke ) within 6 months?      n 16. Do you have mod to severe Obstructive Sleep Apnea?  [Hospital only]    n 17. Do you have SEVERE AND UNCONTROLLED asthma? *NEED PAC APPT AT [facility] w/MAC*     18. Are you currently taking any blood thinners?     18a. No. Continue to 19.   18b. Yes/no  "Blood Thinner: No [CONTINUE TO #19]    n 19. Do you take the medication Phentermine?    19a. If yes, \"Hold for 7 days before procedure.  Please consult your prescribing provider if you have questions about holding this medication.\"     n  20. Do you have chronic kidney disease?      n  21. Do you have a diagnosis of diabetes?     n  22. On a regular basis do you go 3-5 days between bowel movements?     y 23. Preferred LOCAL Pharmacy for Pre Prescription    [ LIST ONLY ONE PHARMACY]        [facility] [address]        - CLOSING REMINDERS -    Informed patient they will need an adult    Cannot take any type of public or medical transportation alone    Conscious Sedation- Needs  for 6 hours after the procedure       MAC/General-Needs  for 24 hours after procedure    Pre-Procedure Covid test to be completed [[facility] PCR Testing Required]    Confirmed Nurse will call to complete assessment       - SCHEDULING DETAILS -  n Hospital Setting Required? If yes, what is the exclusion?: sea Kearns  Surgeon    7/24/23  Date of Procedure  Lower Endoscopy [Colonoscopy]  Type of Procedure Scheduled  [facility]-If you answer yes to questions #8, #20, #21Which Colonoscopy Prep was Sent?     general Sedation Type     n PAC / Pre-op Required                 "

## 2023-07-12 RX ORDER — BISACODYL 5 MG/1
TABLET, DELAYED RELEASE ORAL
Qty: 4 TABLET | Refills: 0 | Status: SHIPPED | OUTPATIENT
Start: 2023-07-12 | End: 2023-07-24

## 2023-07-21 ENCOUNTER — ANESTHESIA EVENT (OUTPATIENT)
Dept: GASTROENTEROLOGY | Facility: CLINIC | Age: 73
End: 2023-07-21
Payer: COMMERCIAL

## 2023-07-21 NOTE — ANESTHESIA PREPROCEDURE EVALUATION
Anesthesia Pre-Procedure Evaluation    Patient: Karen Hernandez   MRN: [id number] : 1950        Procedure : Procedure(s):  Colonoscopy          Past Medical History:   Diagnosis Date     AK (actinic keratosis) 2011     FHX COLON CANCER - SCOPE Q 5Y 2007 negative.      GERD (gastroesophageal reflux disease) 2011     Herpes simplex of eye 2011     Hyperlipidemia LDL goal <130 2008 - Desires lifestyle initially.  Recheck 3-6mo.       Moles 2011     NO ACTIVE PROBLEMS       Past Surgical History:   Procedure Laterality Date     BIOPSY BREAST       COLONOSCOPY  08/10/2012    Procedure: COLONOSCOPY;  Colonoscopy;  Surgeon: Carlos Alatorre MD;  Location: [facility]     COLONOSCOPY N/A 2017    Procedure: COLONOSCOPY;  Surgeon: Stepan Samayoa MD;  Location: [facility]     EYE SURGERY       HYSTERECTOMY, PAP NO LONGER INDICATED      Ovaries left     TONSILLECTOMY       ZZC APPENDECTOMY        Allergies   Allergen Reactions     Nkda [No Known Drug Allergy]       Social History     Tobacco Use     Smoking status: Never     Smokeless tobacco: Never   Substance Use Topics     Alcohol use: Yes     Comment: Social-- 6/wk      Wt Readings from Last 1 Encounters:   23 67 kg (147 lb 9.6 oz)        Anesthesia Evaluation   Pt has had prior anesthetic. Type: General and MAC.        ROS/MED HX  ENT/Pulmonary:       Neurologic:       Cardiovascular:     (+) Dyslipidemia hypertension-----Previous cardiac testing   Echo: Date:  Results:  Interpretation Summary  1. Average exercise capacity, 108% max HR achieved.  2. There was no chest pain or significant ST changes with exercise.  3. Rest echo: Normal left ventricular function and wall motion at rest. The  visual ejection fraction is estimated at 55-60%.  4. Stress echo: This was a normal stress echocardiogram with no evidence of  stress-induced ischemia. The visual ejection  fraction is estimated at 65-70%.  Stress Test: Date: Results:    ECG Reviewed: Date: 11/21 Results:  Sinus rhythm   Possible Left atrial enlargement   Left ventricular hypertrophy   Cath: Date: Results:      METS/Exercise Tolerance:     Hematologic:       Musculoskeletal:       GI/Hepatic:     (+) GERD,     Renal/Genitourinary:       Endo:       Psychiatric/Substance Use:       Infectious Disease:       Malignancy:       Other:            Physical Exam    Airway        Mallampati: I   TM distance: > 3 FB   Neck ROM: full   Mouth opening: > 3 cm    Respiratory Devices and Support         Dental       (+) Completely normal teeth      Cardiovascular   cardiovascular exam normal       Rhythm and rate: regular and normal     Pulmonary           breath sounds clear to auscultation           OUTSIDE LABS:  CBC:   Lab Results   Component Value Date    WBC 5.9 03/07/2023    WBC 7.4 11/13/2021    HGB 12.6 03/07/2023    HGB 12.4 11/13/2021    HCT 39.0 03/07/2023    HCT 38.0 11/13/2021     03/07/2023     11/13/2021     BMP:   Lab Results   Component Value Date     03/07/2023     11/13/2021    POTASSIUM 4.1 03/07/2023    POTASSIUM 3.8 11/13/2021    CHLORIDE 105 03/07/2023    CHLORIDE 109 (H) 11/13/2021    CO2 29 03/07/2023    CO2 24 11/13/2021    BUN 10.3 03/07/2023    BUN 16 11/13/2021    CR 0.81 03/07/2023    CR 0.81 11/13/2021     (H) 03/07/2023     11/13/2021     COAGS: No results found for: PTT, INR, FIBR  POC: No results found for: BGM, HCG, HCGS  HEPATIC:   Lab Results   Component Value Date    ALBUMIN 3.2 (L) 12/02/2019    PROTTOTAL 7.1 12/02/2019    ALT 25 12/02/2019    AST 16 12/02/2019    ALKPHOS 95 12/02/2019    BILITOTAL 0.4 12/02/2019     OTHER:   Lab Results   Component Value Date    A1C 5.8 08/25/2014    SUPRIYA 9.8 03/07/2023    LIPASE 172 03/16/2007    AMYLASE 56 03/16/2007    TSH 3.35 08/08/2013    SED 18 06/08/2012       Anesthesia Plan    ASA Status:  3      Anesthesia  Type: General.   Induction: Intravenous, Propofol.   Maintenance: TIVA.        Consents    Anesthesia Plan(s) and associated risks, benefits, and realistic alternatives discussed. Questions answered and patient/representative(s) expressed understanding.     - Discussed: Risks, Benefits and Alternatives for BOTH SEDATION and the PROCEDURE were discussed     - Discussed with:  Patient      - Extended Intubation/Ventilatory Support Discussed: No.      - Patient is DNR/DNI Status: No    Use of blood products discussed: No .     Postoperative Care            Comments:                ABA Perez CRNA

## 2023-07-24 ENCOUNTER — ANESTHESIA (OUTPATIENT)
Dept: GASTROENTEROLOGY | Facility: CLINIC | Age: 73
End: 2023-07-24
Payer: COMMERCIAL

## 2023-07-24 ENCOUNTER — HOSPITAL ENCOUNTER (OUTPATIENT)
Facility: CLINIC | Age: 73
Discharge: HOME | End: 2023-07-24
Attending: SURGERY | Admitting: SURGERY
Payer: COMMERCIAL

## 2023-07-24 VITALS
DIASTOLIC BLOOD PRESSURE: 72 MMHG | TEMPERATURE: 97.7 F | WEIGHT: 147 LBS | HEART RATE: 76 BPM | SYSTOLIC BLOOD PRESSURE: 103 MMHG | BODY MASS INDEX: 26.05 KG/M2 | RESPIRATION RATE: 16 BRPM | OXYGEN SATURATION: 96 % | HEIGHT: 63 IN

## 2023-07-24 DIAGNOSIS — Z12.11 SPECIAL SCREENING FOR MALIGNANT NEOPLASMS, COLON: Primary | ICD-10-CM

## 2023-07-24 LAB — COLONOSCOPY: NORMAL

## 2023-07-24 PROCEDURE — G0105 COLORECTAL SCRN; HI RISK IND: HCPCS | Performed by: SURGERY

## 2023-07-24 PROCEDURE — 45378 DIAGNOSTIC COLONOSCOPY: CPT | Performed by: SURGERY

## 2023-07-24 PROCEDURE — 258N000003 HC RX IP 258 OP 636: Performed by: SURGERY

## 2023-07-24 PROCEDURE — 370N000017 HC ANESTHESIA TECHNICAL FEE, PER MIN: Performed by: SURGERY

## 2023-07-24 PROCEDURE — 258N000003 HC RX IP 258 OP 636: Performed by: NURSE ANESTHETIST, CERTIFIED REGISTERED

## 2023-07-24 PROCEDURE — 250N000011 HC RX IP 250 OP 636: Performed by: NURSE ANESTHETIST, CERTIFIED REGISTERED

## 2023-07-24 PROCEDURE — 250N000009 HC RX 250: Performed by: NURSE ANESTHETIST, CERTIFIED REGISTERED

## 2023-07-24 RX ORDER — NALOXONE HYDROCHLORIDE 0.4 MG/ML
0.2 INJECTION, SOLUTION INTRAMUSCULAR; INTRAVENOUS; SUBCUTANEOUS
Status: DISCONTINUED | OUTPATIENT
Start: 2023-07-24 | End: 2023-07-24 | Stop reason: HOSPADM

## 2023-07-24 RX ORDER — NALOXONE HYDROCHLORIDE 0.4 MG/ML
0.4 INJECTION, SOLUTION INTRAMUSCULAR; INTRAVENOUS; SUBCUTANEOUS
Status: DISCONTINUED | OUTPATIENT
Start: 2023-07-24 | End: 2023-07-24 | Stop reason: HOSPADM

## 2023-07-24 RX ORDER — SODIUM CHLORIDE, SODIUM LACTATE, POTASSIUM CHLORIDE, CALCIUM CHLORIDE 600; 310; 30; 20 MG/100ML; MG/100ML; MG/100ML; MG/100ML
INJECTION, SOLUTION INTRAVENOUS CONTINUOUS
Status: DISCONTINUED | OUTPATIENT
Start: 2023-07-24 | End: 2023-07-24 | Stop reason: HOSPADM

## 2023-07-24 RX ORDER — OXYCODONE HYDROCHLORIDE 5 MG/1
5 TABLET ORAL
Status: DISCONTINUED | OUTPATIENT
Start: 2023-07-24 | End: 2023-07-24 | Stop reason: HOSPADM

## 2023-07-24 RX ORDER — LIDOCAINE HYDROCHLORIDE 10 MG/ML
INJECTION, SOLUTION INFILTRATION; PERINEURAL PRN
Status: DISCONTINUED | OUTPATIENT
Start: 2023-07-24 | End: 2023-07-24

## 2023-07-24 RX ORDER — LIDOCAINE 40 MG/G
CREAM TOPICAL
Status: DISCONTINUED | OUTPATIENT
Start: 2023-07-24 | End: 2023-07-24 | Stop reason: HOSPADM

## 2023-07-24 RX ORDER — FLUMAZENIL 0.1 MG/ML
0.2 INJECTION, SOLUTION INTRAVENOUS
Status: DISCONTINUED | OUTPATIENT
Start: 2023-07-24 | End: 2023-07-24 | Stop reason: HOSPADM

## 2023-07-24 RX ORDER — PROPOFOL 10 MG/ML
INJECTION, EMULSION INTRAVENOUS CONTINUOUS PRN
Status: DISCONTINUED | OUTPATIENT
Start: 2023-07-24 | End: 2023-07-24

## 2023-07-24 RX ORDER — OXYCODONE HYDROCHLORIDE 5 MG/1
10 TABLET ORAL
Status: DISCONTINUED | OUTPATIENT
Start: 2023-07-24 | End: 2023-07-24 | Stop reason: HOSPADM

## 2023-07-24 RX ORDER — ONDANSETRON 4 MG/1
4 TABLET, ORALLY DISINTEGRATING ORAL EVERY 30 MIN PRN
Status: DISCONTINUED | OUTPATIENT
Start: 2023-07-24 | End: 2023-07-24 | Stop reason: HOSPADM

## 2023-07-24 RX ORDER — ONDANSETRON 2 MG/ML
4 INJECTION INTRAMUSCULAR; INTRAVENOUS
Status: DISCONTINUED | OUTPATIENT
Start: 2023-07-24 | End: 2023-07-24 | Stop reason: HOSPADM

## 2023-07-24 RX ORDER — ONDANSETRON 2 MG/ML
4 INJECTION INTRAMUSCULAR; INTRAVENOUS EVERY 30 MIN PRN
Status: DISCONTINUED | OUTPATIENT
Start: 2023-07-24 | End: 2023-07-24 | Stop reason: HOSPADM

## 2023-07-24 RX ADMIN — PROPOFOL 150 MCG/KG/MIN: 10 INJECTION, EMULSION INTRAVENOUS at 09:54

## 2023-07-24 RX ADMIN — SODIUM CHLORIDE, POTASSIUM CHLORIDE, SODIUM LACTATE AND CALCIUM CHLORIDE: 600; 310; 30; 20 INJECTION, SOLUTION INTRAVENOUS at 09:47

## 2023-07-24 RX ADMIN — SODIUM CHLORIDE, POTASSIUM CHLORIDE, SODIUM LACTATE AND CALCIUM CHLORIDE: 600; 310; 30; 20 INJECTION, SOLUTION INTRAVENOUS at 09:54

## 2023-07-24 RX ADMIN — LIDOCAINE HYDROCHLORIDE 50 MG: 10 INJECTION, SOLUTION INFILTRATION; PERINEURAL at 09:54

## 2023-07-24 ASSESSMENT — ACTIVITIES OF DAILY LIVING (ADL): ADLS_ACUITY_SCORE: 35

## 2023-07-24 NOTE — H&P
73 year old year old female here for colonoscopy for family history of colon cancer.  Patients father was diagnosed with colon cancer in his 60's.  Last colonoscopy was was 5 years ago and negative.  Patient denies change in stool caliber or blood in stool.    Patient Active Problem List   Diagnosis    FHX COLON CANCER - SCOPE Q 5Y    Hyperlipidemia LDL goal <130    AK (actinic keratosis)    Melanocytic nevus    Recurrent cold sores    Herpes simplex of eye    24 hour contact handout given    Loss of height    Dermatitis    Gastroesophageal reflux disease with esophagitis    Essential hypertension with goal blood pressure less than 140/90    Osteopenia    Elevated glucose    Keratosis, seborrheic    Advanced directives, counseling/discussion    Fall       Past Medical History:   Diagnosis Date    AK (actinic keratosis) 4/14/2011    FHX COLON CANCER - SCOPE Q 5Y 5/17/2007 2007 negative.     GERD (gastroesophageal reflux disease) 5/20/2011    Herpes simplex of eye 5/20/2011    Hyperlipidemia LDL goal <130 9/29/2008 September 29, 2008 - Desires lifestyle initially.  Recheck 3-6mo.      Moles 4/14/2011    NO ACTIVE PROBLEMS        Past Surgical History:   Procedure Laterality Date    BIOPSY BREAST      COLONOSCOPY  08/10/2012    Procedure: COLONOSCOPY;  Colonoscopy;  Surgeon: Carlos Alatorre MD;  Location: [facility]    COLONOSCOPY N/A 01/04/2017    Procedure: COLONOSCOPY;  Surgeon: Stepan Samayoa MD;  Location: [facility]    EYE SURGERY  2017    HYSTERECTOMY, PAP NO LONGER INDICATED  2000    Ovaries left    TONSILLECTOMY  1958    Z APPENDECTOMY  1960       Family History   Problem Relation Age of Onset    Cancer - colorectal Father     Cancer Daughter     Breast Cancer Daughter     Congenital Anomalies Daughter         trisomy 18    Diabetes No family hx of     Hypertension No family hx of     Cerebrovascular Disease No family hx of     Prostate Cancer No family hx of        Current Outpatient Rx  "  Medication Sig Dispense Refill    bisacodyl (DULCOLAX) 5 MG EC tablet Take 2 tablets at 3 pm the day before your procedure. If your procedure is before 11 am, take 2 additional tablets at 11 pm. If your procedure is after 11 am, take 2 additional tablets at 6 am. For additional instructions refer to your colonoscopy prep instructions. 4 tablet 0    polyethylene glycol (GOLYTELY) 236 g suspension The night before the exam at 6 pm drink an 8-ounce glass every 15 minutes until the jug is half empty. If you arrive before 11 AM: Drink the other half of the Golytely jug at 11 PM night before procedure. If you arrive after 11 AM: Drink the other half of the Golytely jug at 6 AM day of procedure. For additional instructions refer to your colonoscopy prep instructions. 4000 mL 0       Allergies   Allergen Reactions    Nkda [No Known Drug Allergy]        Pt reports that she has never smoked. She has never used smokeless tobacco. She reports current alcohol use. She reports that she does not use drugs.    Exam:  BP (!) 143/99 (BP Location: Left arm)   Pulse 77   Temp 98.1  F (36.7  C) (Oral)   Resp 16   Ht 1.607 m (5' 3.25\")   Wt 66.7 kg (147 lb)   LMP  (LMP Unknown)   SpO2 97%   BMI 25.83 kg/m      Awake, Alert OX3  Lungs - CTA bilaterally  CV - RRR, no murmurs, distal pulses intact  Abd - soft, non-distended, non-tender, +BS  Extr - No cyanosis or edema    A/P 73 year old year old female in need of colonoscopy for family history of colon cancer. Risks, benefits, alternatives, and complications were discussed including the possibility of perforation, bleeding, missed lesion, anesthetic complication, need for additional surgery/procedure, and the patient agreed to proceed.    Emil Kearns, DO on 7/24/2023 at 9:31 AM    "

## 2023-07-24 NOTE — ANESTHESIA POSTPROCEDURE EVALUATION
Patient: Karen Hernandez    Procedure: Procedure(s):  Colonoscopy       Anesthesia Type:  General    Note:  Disposition: Outpatient   Postop Pain Control: Uneventful            Sign Out: Well controlled pain   PONV: No   Neuro/Psych: Uneventful            Sign Out: Acceptable/Baseline neuro status   Airway/Respiratory: Uneventful            Sign Out: Acceptable/Baseline resp. status   CV/Hemodynamics: Uneventful            Sign Out: Acceptable CV status; No obvious hypovolemia; No obvious fluid overload   Other NRE: NONE   DID A NON-ROUTINE EVENT OCCUR? No           Last vitals:  Vitals Value Taken Time   /72 07/24/23 1030   Temp     Pulse 76 07/24/23 1030   Resp 16 07/24/23 1030   SpO2 96 % 07/24/23 1030       Electronically Signed By: ABA Perez CRNA  July 24, 2023  10:37 AM

## 2023-07-24 NOTE — ANESTHESIA CARE TRANSFER NOTE
Patient: Karen Hernandez    Procedure: Procedure(s):  Colonoscopy       Diagnosis: Screen for colon cancer [Z12.11]  Diagnosis Additional Information: No value filed.    Anesthesia Type:   General     Note:    Oropharynx: oropharynx clear of all foreign objects  Level of Consciousness: awake  Oxygen Supplementation: room air    Independent Airway: airway patency satisfactory and stable  Dentition: dentition unchanged  Vital Signs Stable: post-procedure vital signs reviewed and stable  Report to RN Given: handoff report given  Patient transferred to: Phase II    Handoff Report: Identifed the Patient, Identified the Reponsible Provider, Reviewed the pertinent medical history, Discussed the surgical course, Reviewed Intra-OP anesthesia mangement and issues during anesthesia, Set expectations for post-procedure period and Allowed opportunity for questions and acknowledgement of understanding      Vitals:  Vitals Value Taken Time   BP 98/67 07/24/23 1016   Temp     Pulse 75 07/24/23 1016   Resp     SpO2 95 % 07/24/23 1020   Vitals shown include unvalidated device data.    Electronically Signed By: BAA Perez CRNA  July 24, 2023  10:21 AM

## 2023-08-24 ENCOUNTER — HOSPITAL ENCOUNTER (EMERGENCY)
Facility: CLINIC | Age: 73
Discharge: HOME | End: 2023-08-24
Payer: COMMERCIAL

## 2023-08-24 ENCOUNTER — TELEPHONE (OUTPATIENT)
Dept: FAMILY MEDICINE | Facility: CLINIC | Age: 73
End: 2023-08-24

## 2023-08-24 VITALS
BODY MASS INDEX: 25.73 KG/M2 | DIASTOLIC BLOOD PRESSURE: 87 MMHG | TEMPERATURE: 98.5 F | WEIGHT: 146.4 LBS | SYSTOLIC BLOOD PRESSURE: 160 MMHG | RESPIRATION RATE: 16 BRPM | HEART RATE: 76 BPM | OXYGEN SATURATION: 98 %

## 2023-08-24 DIAGNOSIS — Z20.9 EXPOSURE TO BAT WITHOUT KNOWN BITE: ICD-10-CM

## 2023-08-24 PROCEDURE — 90675 RABIES VACCINE IM: CPT

## 2023-08-24 PROCEDURE — G0463 HOSPITAL OUTPT CLINIC VISIT: HCPCS | Mod: 25

## 2023-08-24 PROCEDURE — 90471 IMMUNIZATION ADMIN: CPT

## 2023-08-24 PROCEDURE — 99213 OFFICE O/P EST LOW 20 MIN: CPT

## 2023-08-24 PROCEDURE — 90472 IMMUNIZATION ADMIN EACH ADD: CPT

## 2023-08-24 PROCEDURE — 90375 RABIES IG IM/SC: CPT | Mod: JZ

## 2023-08-24 PROCEDURE — 96372 THER/PROPH/DIAG INJ SC/IM: CPT

## 2023-08-24 PROCEDURE — 90715 TDAP VACCINE 7 YRS/> IM: CPT

## 2023-08-24 PROCEDURE — 250N000011 HC RX IP 250 OP 636

## 2023-08-24 RX ADMIN — CLOSTRIDIUM TETANI TOXOID ANTIGEN (FORMALDEHYDE INACTIVATED), CORYNEBACTERIUM DIPHTHERIAE TOXOID ANTIGEN (FORMALDEHYDE INACTIVATED), BORDETELLA PERTUSSIS TOXOID ANTIGEN (GLUTARALDEHYDE INACTIVATED), BORDETELLA PERTUSSIS FILAMENTOUS HEMAGGLUTININ ANTIGEN (FORMALDEHYDE INACTIVATED), BORDETELLA PERTUSSIS PERTACTIN ANTIGEN, AND BORDETELLA PERTUSSIS FIMBRIAE 2/3 ANTIGEN 0.5 ML: 5; 2; 2.5; 5; 3; 5 INJECTION, SUSPENSION INTRAMUSCULAR at 13:38

## 2023-08-24 RX ADMIN — RABIES IMMUNE GLOBULIN (HUMAN) 1320 UNITS: 300 INJECTION, SOLUTION INFILTRATION; INTRAMUSCULAR at 13:40

## 2023-08-24 RX ADMIN — Medication 1 ML: at 13:39

## 2023-08-24 ASSESSMENT — ACTIVITIES OF DAILY LIVING (ADL): ADLS_ACUITY_SCORE: 35

## 2023-08-24 NOTE — DISCHARGE INSTRUCTIONS
Return on days as directed for additional rabies vaccinations.  The handout provided for dates.  You will have to be seen in the urgent care for any dates that fall on the weekend.  Please call clinic to ensure that they are able to provide rabies vaccinations on additional dates.  If unable, please return here for those vaccinations.

## 2023-08-24 NOTE — TELEPHONE ENCOUNTER
[place] bat flying around in the cabin. They woke up to the bat flying around.  Writer did review the [facility] guidelines. There is a number for the public to contact of bat exposure.  The number was given to patient and advised to be seen in the ER if they are not able to contact [place] dept of health.      The patient agrees with the plan.    Thank you    Tina MCMANUS RN

## 2023-08-24 NOTE — TELEPHONE ENCOUNTER
No she will need to go to the emergency room . Clinics do not keep that on hand. Heena Villarreal M.D.

## 2023-08-24 NOTE — ED PROVIDER NOTES
History     Chief Complaint   Patient presents with    Bat Exposure     HPI  Karen Hernandez is a 73 year old female who presents urgent care for concern of bat exposure.  Patient states that over the past weekend they were at their cabin and noted a bat to be flying.  There is no known bite.  Patient states the were able to get deep outside and no longer have it in her possession.  States that she is otherwise feeling well and have no other concerns today.  Are interested in the rabies vaccination series based on the current [facility] guidelines.  Have no other new concerns today.  Last tetanus was in 2013.    Allergies:  Allergies   Allergen Reactions    Nkda [No Known Drug Allergy]        Problem List:    Patient Active Problem List    Diagnosis Date Noted    FHX COLON CANCER - SCOPE Q 5Y 05/17/2007     Priority: High     2007 negative.       Exposure to bat without known bite 08/24/2023     Priority: Medium    Fall 11/13/2021     Priority: Medium    Advanced directives, counseling/discussion 10/17/2017     Priority: Medium     Advance Care Planning 10/17/2017: ACP Review of Chart / Resources Provided:  Reviewed chart for advance care plan.  Karen Hernandez has been provided information and resources to begin or update their advance care plan.  Added by Elvira Gaspar            Keratosis, seborrheic 02/27/2017     Priority: Medium    Elevated glucose 08/03/2016     Priority: Medium    Osteopenia 07/26/2016     Priority: Medium    Essential hypertension with goal blood pressure less than 140/90 06/15/2016     Priority: Medium    Gastroesophageal reflux disease with esophagitis 11/22/2015     Priority: Medium    Loss of height 08/08/2013     Priority: Medium    Dermatitis 08/08/2013     Priority: Medium    Herpes simplex of eye 05/20/2011     Priority: Medium    Recurrent cold sores 04/29/2011     Priority: Medium    AK (actinic keratosis) 04/14/2011     Priority: Medium    Melanocytic nevus 04/14/2011      Priority: Medium     (Problem list name updated by automated process. Provider to review and confirm.)      Hyperlipidemia LDL goal <130 09/29/2008     Priority: Medium     September 29, 2008 - Desires lifestyle initially.  Recheck 3-6mo.         24 hour contact handout given 02/23/2012     Priority: Low     EMERGENCY CARE PLAN  Presenting Problem Signs and Symptoms Treatment Plan    Questions or conerns during clinic hours    I will call the clinic directly     Questions or conerns outside clinic hours    I will call the 24 hour nurse line at [phone]    Patient needs to schedule an appointment    I will call the 24 hour scheduling team at [phone] or clinic directly    Same day treatment     I will call the clinic first, nurse line if after hours, urgent care and express care if needed                                        Past Medical History:    Past Medical History:   Diagnosis Date    AK (actinic keratosis) 4/14/2011    FHX COLON CANCER - SCOPE Q 5Y 5/17/2007    GERD (gastroesophageal reflux disease) 5/20/2011    Herpes simplex of eye 5/20/2011    Hyperlipidemia LDL goal <130 9/29/2008    Moles 4/14/2011    NO ACTIVE PROBLEMS        Past Surgical History:    Past Surgical History:   Procedure Laterality Date    BIOPSY BREAST      COLONOSCOPY  08/10/2012    Procedure: COLONOSCOPY;  Colonoscopy;  Surgeon: Carlos Alatorre MD;  Location: [facility]    COLONOSCOPY N/A 01/04/2017    Procedure: COLONOSCOPY;  Surgeon: Stepan Samayoa MD;  Location: [facility]    COLONOSCOPY N/A 7/24/2023    Procedure: Colonoscopy;  Surgeon: Emil Kearns DO;  Location: [facility]    EYE SURGERY  2017    HYSTERECTOMY, PAP NO LONGER INDICATED  2000    Ovaries left    TONSILLECTOMY  1958    Z APPENDECTOMY  1960       Family History:    Family History   Problem Relation Age of Onset    Cancer - colorectal Father     Cancer Daughter     Breast Cancer Daughter     Congenital Anomalies Daughter         trisomy 18     Diabetes No family hx of     Hypertension No family hx of     Cerebrovascular Disease No family hx of     Prostate Cancer No family hx of        Social History:  Marital Status:   [2]  Social History     Tobacco Use    Smoking status: Never    Smokeless tobacco: Never   Substance Use Topics    Alcohol use: Yes     Comment: Social-- 6/wk    Drug use: No        Medications:    atorvastatin (LIPITOR) 20 MG tablet  losartan (COZAAR) 50 MG tablet  pantoprazole (PROTONIX) 40 MG EC tablet  valACYclovir (VALTREX) 1000 mg tablet  clobetasol (TEMOVATE) 0.05 % external cream  ketoconazole (NIZORAL) 2 % external shampoo          Review of Systems  See HPI    Physical Exam   BP: (!) 160/87  Pulse: 76  Temp: 98.5  F (36.9  C)  Resp: 16  Weight: 66.4 kg (146 lb 6.4 oz)  SpO2: 98 %      Physical Exam  Constitutional:       General: She is not in acute distress.     Appearance: Normal appearance. She is well-developed.   HENT:      Head: Normocephalic and atraumatic.   Eyes:      General: No scleral icterus.     Conjunctiva/sclera: Conjunctivae normal.   Cardiovascular:      Rate and Rhythm: Normal rate.   Pulmonary:      Effort: Pulmonary effort is normal. No respiratory distress.   Abdominal:      General: Abdomen is flat.   Musculoskeletal:      Cervical back: Normal range of motion and neck supple.   Skin:     General: Skin is warm and dry.      Findings: No rash.   Neurological:      Mental Status: She is alert and oriented to person, place, and time.         ED Course                 Procedures           No results found for this or any previous visit (from the past 24 hour(s)).    Medications   rabies immune globulin 300 units/mL (HYPERAB) injection 1,320 Units (has no administration in time range)   rabies vaccine, PCEC (chick embryo derived) (RABAVERT) injection 1 mL (has no administration in time range)   Tdap (tetanus-diphtheria-acell pertussis) (ADACEL) injection 0.5 mL (has no administration in time range)        Assessments & Plan (with Medical Decision Making)   Patient presented to urgent care for concern of bat exposure.  Patient is afebrile on arrival and vital signs otherwise reassuring.  Reviewed precautions and guidelines related to rabies vaccinations.  Patient elects to proceed with vaccination series at this time.  Side effects of the vaccinations were reviewed.  Rabies immunoglobulin as well as the vaccination were provided in department today.  No signs of allergic reaction prior to discharge.  Patient was provided with guidelines to scheduling future vaccinations on days 3, 7, and 14.  Handouts were provided on discharge.  Return precautions were reviewed.  Tetanus status was updated today as well.  Patient was discharged in good condition and is agreeable to the above plan.    I have reviewed the nursing notes.    I have reviewed the findings, diagnosis, plan and need for follow up with the patient.      New Prescriptions    No medications on file       Final diagnoses:   Exposure to bat without known bite       8/24/2023   [facility] EMERGENCY DEPT      Disclaimer:  This note consists of symbols derived from keyboarding, dictation and/or voice recognition software.  As a result, there may be errors in the script that have gone undetected.  Please consider this when interpreting information found in this chart.       Nathaniel Carpenter, ABA CNP  08/24/23 2349

## 2023-08-24 NOTE — TELEPHONE ENCOUNTER
Patient wondering if she can get rabies shot here at [facility]. Please see previous notes.    Sonia Gabriel, BETH [facility]

## 2023-08-27 ENCOUNTER — HOSPITAL ENCOUNTER (EMERGENCY)
Facility: CLINIC | Age: 73
Discharge: HOME | End: 2023-08-27
Admitting: PHYSICIAN ASSISTANT
Payer: COMMERCIAL

## 2023-08-27 VITALS
OXYGEN SATURATION: 98 % | TEMPERATURE: 98.2 F | RESPIRATION RATE: 14 BRPM | HEART RATE: 84 BPM | SYSTOLIC BLOOD PRESSURE: 137 MMHG | DIASTOLIC BLOOD PRESSURE: 91 MMHG

## 2023-08-27 DIAGNOSIS — Z20.9 EXPOSURE TO BAT WITHOUT KNOWN BITE: ICD-10-CM

## 2023-08-27 PROCEDURE — 90471 IMMUNIZATION ADMIN: CPT

## 2023-08-27 PROCEDURE — 99281 EMR DPT VST MAYX REQ PHY/QHP: CPT | Performed by: PHYSICIAN ASSISTANT

## 2023-08-27 PROCEDURE — 90675 RABIES VACCINE IM: CPT

## 2023-08-27 PROCEDURE — 250N000011 HC RX IP 250 OP 636

## 2023-08-27 RX ADMIN — Medication 1 ML: at 12:12

## 2023-08-27 NOTE — ED TRIAGE NOTES
Here for 2nd rabies injection, no problems with first shots     Triage Assessment       Row Name 08/27/23 1200       Triage Assessment (Adult)    Airway WDL WDL       Respiratory WDL    Respiratory WDL WDL       Peripheral/Neurovascular WDL    Peripheral Neurovascular WDL WDL       Cognitive/Neuro/Behavioral WDL    Cognitive/Neuro/Behavioral WDL WDL

## 2023-08-28 ENCOUNTER — TELEPHONE (OUTPATIENT)
Dept: FAMILY MEDICINE | Facility: CLINIC | Age: 73
End: 2023-08-28
Payer: COMMERCIAL

## 2023-08-28 NOTE — TELEPHONE ENCOUNTER
Patient called today asking about the Rabies vaccine and letting us know we needed to order it. She stated it would be her, her , her daughter and her daughters 2 boys.     RN ordered 5 Rabies vaccines with expedited shipping so they can receive them on Thursday.    Katie Johnson RN on 8/28/2023 at 10:00 AM

## 2023-08-29 ENCOUNTER — TELEPHONE (OUTPATIENT)
Dept: FAMILY MEDICINE | Facility: CLINIC | Age: 73
End: 2023-08-29
Payer: COMMERCIAL

## 2023-08-29 NOTE — TELEPHONE ENCOUNTER
Rabies vaccines ordered for patient to received on 8/31/2023. RN is working on coordinating vaccines for the 7th for patient.   Routing to provider to place orders for future vaccine injections.     Katie Johnson RN on 8/29/2023 at 12:01 PM

## 2023-08-31 ENCOUNTER — ALLIED HEALTH/NURSE VISIT (OUTPATIENT)
Dept: FAMILY MEDICINE | Facility: CLINIC | Age: 73
End: 2023-08-31
Payer: COMMERCIAL

## 2023-08-31 DIAGNOSIS — Z20.9 EXPOSURE TO BAT WITHOUT KNOWN BITE: Primary | ICD-10-CM

## 2023-08-31 PROCEDURE — 90471 IMMUNIZATION ADMIN: CPT

## 2023-08-31 PROCEDURE — 90675 RABIES VACCINE IM: CPT

## 2023-08-31 PROCEDURE — 99207 PR NO CHARGE NURSE ONLY: CPT

## 2023-08-31 NOTE — PROGRESS NOTES
Prior to immunization administration, verified patients identity using patient s name and date of birth. Please see Immunization Activity for additional information.     Screening Questionnaire for Adult Immunization    Are you sick today?   No   Do you have allergies to medications, food, a vaccine component or latex?   No   Have you ever had a serious reaction after receiving a vaccination?   No   Do you have a long-term health problem with heart, lung, kidney, or metabolic disease (e.g., diabetes), asthma, a blood disorder, no spleen, complement component deficiency, a cochlear implant, or a spinal fluid leak?  Are you on long-term aspirin therapy?   No   Do you have cancer, leukemia, HIV/AIDS, or any other immune system problem?   No   Do you have a parent, brother, or sister with an immune system problem?   No   In the past 3 months, have you taken medications that affect  your immune system, such as prednisone, other steroids, or anticancer drugs; drugs for the treatment of rheumatoid arthritis, Crohn s disease, or psoriasis; or have you had radiation treatments?   No   Have you had a seizure, or a brain or other nervous system problem?   No   During the past year, have you received a transfusion of blood or blood    products, or been given immune (gamma) globulin or antiviral drug?   No   For women: Are you pregnant or is there a chance you could become       pregnant during the next month?   No   Have you received any vaccinations in the past 4 weeks?   No     Immunization questionnaire answers were all negative.    I have reviewed the following standing orders: Not Applicable; Order were already placed prior to ancillary visit 3rd Rabies vaccine    Patient instructed to remain in clinic for 15 minutes afterwards, and to report any adverse reactions.     Screening performed by Rebecca Gordon CMA on 8/31/2023 at 10:03 AM.

## 2023-09-07 ENCOUNTER — ALLIED HEALTH/NURSE VISIT (OUTPATIENT)
Dept: FAMILY MEDICINE | Facility: CLINIC | Age: 73
End: 2023-09-07
Payer: COMMERCIAL

## 2023-09-07 DIAGNOSIS — Z20.9 EXPOSURE TO BAT WITHOUT KNOWN BITE: Primary | ICD-10-CM

## 2023-09-07 PROCEDURE — 99207 PR NO CHARGE NURSE ONLY: CPT

## 2023-09-07 PROCEDURE — 90675 RABIES VACCINE IM: CPT

## 2023-09-07 PROCEDURE — 90471 IMMUNIZATION ADMIN: CPT

## 2023-09-07 NOTE — PROGRESS NOTES
Prior to immunization administration, verified patients identity using patient s name and date of birth. Please see Immunization Activity for additional information.     Screening Questionnaire for Adult Immunization    Are you sick today?   No   Do you have allergies to medications, food, a vaccine component or latex?   No   Have you ever had a serious reaction after receiving a vaccination?   No   Do you have a long-term health problem with heart, lung, kidney, or metabolic disease (e.g., diabetes), asthma, a blood disorder, no spleen, complement component deficiency, a cochlear implant, or a spinal fluid leak?  Are you on long-term aspirin therapy?   No   Do you have cancer, leukemia, HIV/AIDS, or any other immune system problem?   No   Do you have a parent, brother, or sister with an immune system problem?   No   In the past 3 months, have you taken medications that affect  your immune system, such as prednisone, other steroids, or anticancer drugs; drugs for the treatment of rheumatoid arthritis, Crohn s disease, or psoriasis; or have you had radiation treatments?   No   Have you had a seizure, or a brain or other nervous system problem?   No   During the past year, have you received a transfusion of blood or blood    products, or been given immune (gamma) globulin or antiviral drug?   No   For women: Are you pregnant or is there a chance you could become       pregnant during the next month?   No   Have you received any vaccinations in the past 4 weeks?   No     Immunization questionnaire answers were all negative.    I have reviewed the following standing orders:   This patient is due and qualifies for the RABIES vaccine.       I have reviewed the vaccines inclusion and exclusion criteria; No concerns regarding eligibility.     Patient instructed to remain in clinic for 15 minutes afterwards, and to report any adverse reactions.     Screening performed by Rebecca Gordon CMA on 9/7/2023 at 9:38  AM.

## 2023-12-30 DIAGNOSIS — E78.5 HYPERLIPIDEMIA LDL GOAL <130: ICD-10-CM

## 2024-01-02 RX ORDER — ATORVASTATIN CALCIUM 20 MG/1
TABLET, FILM COATED ORAL
Qty: 90 TABLET | Refills: 0 | Status: SHIPPED | OUTPATIENT
Start: 2024-01-02 | End: 2024-03-29

## 2024-01-09 ENCOUNTER — HOSPITAL ENCOUNTER (OUTPATIENT)
Dept: MAMMOGRAPHY | Facility: CLINIC | Age: 74
Discharge: HOME | End: 2024-01-09
Admitting: PHYSICIAN ASSISTANT
Payer: COMMERCIAL

## 2024-01-09 DIAGNOSIS — Z12.31 VISIT FOR SCREENING MAMMOGRAM: ICD-10-CM

## 2024-01-09 PROCEDURE — 77063 BREAST TOMOSYNTHESIS BI: CPT

## 2024-01-12 DIAGNOSIS — E78.5 HYPERLIPIDEMIA LDL GOAL <130: ICD-10-CM

## 2024-01-12 RX ORDER — ATORVASTATIN CALCIUM 20 MG/1
TABLET, FILM COATED ORAL
Qty: 90 TABLET | Refills: 0 | OUTPATIENT
Start: 2024-01-12

## 2024-01-25 ENCOUNTER — OFFICE VISIT (OUTPATIENT)
Dept: PEDIATRICS | Facility: CLINIC | Age: 74
End: 2024-01-25
Payer: COMMERCIAL

## 2024-01-25 ENCOUNTER — NURSE TRIAGE (OUTPATIENT)
Dept: FAMILY MEDICINE | Facility: CLINIC | Age: 74
End: 2024-01-25

## 2024-01-25 ENCOUNTER — HOSPITAL ENCOUNTER (OUTPATIENT)
Dept: CT IMAGING | Facility: CLINIC | Age: 74
Discharge: HOME | End: 2024-01-25
Attending: FAMILY MEDICINE | Admitting: FAMILY MEDICINE
Payer: COMMERCIAL

## 2024-01-25 VITALS
DIASTOLIC BLOOD PRESSURE: 98 MMHG | OXYGEN SATURATION: 96 % | WEIGHT: 146.8 LBS | HEIGHT: 64 IN | SYSTOLIC BLOOD PRESSURE: 154 MMHG | HEART RATE: 76 BPM | RESPIRATION RATE: 16 BRPM | TEMPERATURE: 97.7 F | BODY MASS INDEX: 25.06 KG/M2

## 2024-01-25 DIAGNOSIS — R10.31 RLQ ABDOMINAL PAIN: ICD-10-CM

## 2024-01-25 DIAGNOSIS — R10.31 RLQ ABDOMINAL PAIN: Primary | ICD-10-CM

## 2024-01-25 LAB
ALBUMIN SERPL BCG-MCNC: 4.4 G/DL (ref 3.5–5.2)
ALBUMIN UR-MCNC: NEGATIVE MG/DL
ALP SERPL-CCNC: 75 U/L (ref 40–150)
ALT SERPL W P-5'-P-CCNC: 25 U/L (ref 0–50)
ANION GAP SERPL CALCULATED.3IONS-SCNC: 10 MMOL/L (ref 7–15)
APPEARANCE UR: CLEAR
AST SERPL W P-5'-P-CCNC: 25 U/L (ref 0–45)
BASOPHILS # BLD AUTO: 0 10E3/UL (ref 0–0.2)
BASOPHILS NFR BLD AUTO: 1 %
BILIRUB SERPL-MCNC: 0.3 MG/DL
BILIRUB UR QL STRIP: NEGATIVE
BUN SERPL-MCNC: 15.7 MG/DL (ref 8–23)
CALCIUM SERPL-MCNC: 9.4 MG/DL (ref 8.8–10.2)
CHLORIDE SERPL-SCNC: 106 MMOL/L (ref 98–107)
COLOR UR AUTO: YELLOW
CREAT SERPL-MCNC: 0.82 MG/DL (ref 0.51–0.95)
DEPRECATED HCO3 PLAS-SCNC: 24 MMOL/L (ref 22–29)
EGFRCR SERPLBLD CKD-EPI 2021: 75 ML/MIN/1.73M2
EOSINOPHIL # BLD AUTO: 0.5 10E3/UL (ref 0–0.7)
EOSINOPHIL NFR BLD AUTO: 8 %
ERYTHROCYTE [DISTWIDTH] IN BLOOD BY AUTOMATED COUNT: 12.8 % (ref 10–15)
GLUCOSE SERPL-MCNC: 110 MG/DL (ref 70–99)
GLUCOSE UR STRIP-MCNC: NEGATIVE MG/DL
HCT VFR BLD AUTO: 37.6 % (ref 35–47)
HGB BLD-MCNC: 12.3 G/DL (ref 11.7–15.7)
HGB UR QL STRIP: NEGATIVE
IMM GRANULOCYTES # BLD: 0 10E3/UL
IMM GRANULOCYTES NFR BLD: 1 %
KETONES UR STRIP-MCNC: NEGATIVE MG/DL
LEUKOCYTE ESTERASE UR QL STRIP: ABNORMAL
LYMPHOCYTES # BLD AUTO: 1.4 10E3/UL (ref 0.8–5.3)
LYMPHOCYTES NFR BLD AUTO: 23 %
MCH RBC QN AUTO: 31.1 PG (ref 26.5–33)
MCHC RBC AUTO-ENTMCNC: 32.7 G/DL (ref 31.5–36.5)
MCV RBC AUTO: 95 FL (ref 78–100)
MONOCYTES # BLD AUTO: 0.5 10E3/UL (ref 0–1.3)
MONOCYTES NFR BLD AUTO: 9 %
MUCOUS THREADS #/AREA URNS LPF: PRESENT /LPF
NEUTROPHILS # BLD AUTO: 3.6 10E3/UL (ref 1.6–8.3)
NEUTROPHILS NFR BLD AUTO: 58 %
NITRATE UR QL: NEGATIVE
NRBC # BLD AUTO: 0 10E3/UL
NRBC BLD AUTO-RTO: 0 /100
PH UR STRIP: 5 [PH] (ref 5–7)
PLATELET # BLD AUTO: 259 10E3/UL (ref 150–450)
POTASSIUM SERPL-SCNC: 4.5 MMOL/L (ref 3.4–5.3)
PROT SERPL-MCNC: 7 G/DL (ref 6.4–8.3)
RBC # BLD AUTO: 3.95 10E6/UL (ref 3.8–5.2)
RBC URINE: 1 /HPF
SODIUM SERPL-SCNC: 140 MMOL/L (ref 135–145)
SP GR UR STRIP: 1.02 (ref 1–1.03)
SQUAMOUS EPITHELIAL: 3 /HPF
UROBILINOGEN UR STRIP-MCNC: NORMAL MG/DL
WBC # BLD AUTO: 6.1 10E3/UL (ref 4–11)
WBC URINE: 3 /HPF

## 2024-01-25 PROCEDURE — 87086 URINE CULTURE/COLONY COUNT: CPT | Performed by: FAMILY MEDICINE

## 2024-01-25 PROCEDURE — 81001 URINALYSIS AUTO W/SCOPE: CPT | Performed by: FAMILY MEDICINE

## 2024-01-25 PROCEDURE — 85025 COMPLETE CBC W/AUTO DIFF WBC: CPT | Performed by: FAMILY MEDICINE

## 2024-01-25 PROCEDURE — 74177 CT ABD & PELVIS W/CONTRAST: CPT

## 2024-01-25 PROCEDURE — 80053 COMPREHEN METABOLIC PANEL: CPT | Performed by: FAMILY MEDICINE

## 2024-01-25 PROCEDURE — 36415 COLL VENOUS BLD VENIPUNCTURE: CPT | Performed by: FAMILY MEDICINE

## 2024-01-25 PROCEDURE — 250N000011 HC RX IP 250 OP 636: Performed by: RADIOLOGY

## 2024-01-25 PROCEDURE — 250N000009 HC RX 250: Performed by: RADIOLOGY

## 2024-01-25 PROCEDURE — 99214 OFFICE O/P EST MOD 30 MIN: CPT | Performed by: FAMILY MEDICINE

## 2024-01-25 RX ORDER — IOPAMIDOL 755 MG/ML
71 INJECTION, SOLUTION INTRAVASCULAR ONCE
Status: COMPLETED | OUTPATIENT
Start: 2024-01-25 | End: 2024-01-25

## 2024-01-25 RX ADMIN — SODIUM CHLORIDE 58 ML: 9 INJECTION, SOLUTION INTRAVENOUS at 12:07

## 2024-01-25 RX ADMIN — IOPAMIDOL 71 ML: 755 INJECTION, SOLUTION INTRAVENOUS at 12:07

## 2024-01-25 ASSESSMENT — PAIN SCALES - GENERAL: PAINLEVEL: NO PAIN (1)

## 2024-01-25 NOTE — TELEPHONE ENCOUNTER
Contacted the [facility], Dr. Madsen reviewed and agrees will see patient, the [facility] care team will call patient to schedule today.        Referral to Acute and Diagnostic Services    [phone] ([place]) [place] [address] [place] [id number]    Transition to Acute & Diagnostic Services Clinic has been discussed with patient, and she agrees with next level of care.   Patient understands that evaluation/treatment at [facility] typically takes significantly longer than in clinic/urgent care (>2 hours).  The [facility] has been contacted by provider/staff to confirm patient acceptance.         Special issues:      None      Reviewed special issues and patient has no concerns.          ALBERTO Cisneros

## 2024-01-25 NOTE — TELEPHONE ENCOUNTER
I think this would be a great one for ADS as she may need labs and imaging same day    Delphine Aguilar PA-C

## 2024-01-25 NOTE — PATIENT INSTRUCTIONS
Acetaminophen (Tylenol) 500mg tablets.  You may take 2 tabs every 4-6 hours as needed  Ibuprofen (Advil, Motrin) 200mg tablets.  You may take 3 tabs every 6-8 hours as needed with food.  Cool packs as needed  Follow up if not improving as anticipated.

## 2024-01-25 NOTE — TELEPHONE ENCOUNTER
"Delphine Aguilar:    Patient was called and triaged for moderate lower right dull abdominal pain that started a week ago, no fever today but has felt warm past week, has had her appendix out, constant pain, no bowel changes, no urinary concerns.     Please provide second level triage, patient is a candidate for ADS which opens at 9 am.    Please advise.        Reason for Disposition   MILD TO MODERATE constant pain lasting > 2 hours    Answer Assessment - Initial Assessment Questions  1. LOCATION: \"Where does it hurt?\"       Patient complains of dull lower right abdominal pain  2. RADIATION: \"Does the pain shoot anywhere else?\" (e.g., chest, back)      Denies it shoots anywhere  3. ONSET: \"When did the pain begin?\" (e.g., minutes, hours or days ago)       Started suddenly a week ago and patient feels it is getting worse  4. SUDDEN: \"Gradual or sudden onset?\"      Suddenly a week ago  5. PATTERN \"Does the pain come and go, or is it constant?\"     - If it comes and goes: \"How long does it last?\" \"Do you have pain now?\"      (Note: Comes and goes means the pain is intermittent. It goes away completely between bouts.)     - If constant: \"Is it getting better, staying the same, or getting worse?\"       (Note: Constant means the pain never goes away completely; most serious pain is constant and gets worse.)       Constant, worse at night but now getting more intense during the day  6. SEVERITY: \"How bad is the pain?\"  (e.g., Scale 1-10; mild, moderate, or severe)     - MILD (1-3): Doesn't interfere with normal activities, abdomen soft and not tender to touch.      - MODERATE (4-7): Interferes with normal activities or awakens from sleep, abdomen tender to touch.      - SEVERE (8-10): Excruciating pain, doubled over, unable to do any normal activities.        Was 2/10 at first, now more like 5-6/10  7. RECURRENT SYMPTOM: \"Have you ever had this type of stomach pain before?\" If Yes, ask: \"When was the last time?\" and \"What " "happened that time?\"       New, patient has had her appendix out  8. CAUSE: \"What do you think is causing the stomach pain?\"      unsure  9. RELIEVING/AGGRAVATING FACTORS: \"What makes it better or worse?\" (e.g., antacids, bending or twisting motion, bowel movement)      Tried ibuprofen and that did not help, says having a bowel movement helps with alleviating discomfort  10. OTHER SYMPTOMS: \"Do you have any other symptoms?\" (e.g., back pain, diarrhea, fever, urination pain, vomiting)        Denies urinary concerns, no change in bowel pattern, patient does feel warm and thought she had a fever but not warm today, denies chest pain or shortness of breath, not nauseated, patient is able to eat and drink and not vomiting.  11. PREGNANCY: \"Is there any chance you are pregnant?\" \"When was your last menstrual period?\"        na    Protocols used: Abdominal Pain - Female-A-OH        ALBERTO Cisneros    "

## 2024-01-25 NOTE — PROGRESS NOTES
"  Assessment & Plan     RLQ abdominal pain  Labs are within normal limits  CT abdomen pelvis shows some mild mesenteric fat stranding which is consistent with the CT she had in 2019.  At the time in 2019 she also had some signs of diverticulitis.  For now we will do conservative treatment and have her follow-up if her symptoms persist or worsen  Recommended:  Acetaminophen (Tylenol) 500mg tablets.  You may take 2 tabs every 4-6 hours as needed  Ibuprofen (Advil, Motrin) 200mg tablets.  You may take 3 tabs every 6-8 hours as needed with food.  Cool packs as needed  Follow up if not improving as anticipated.  - CBC with platelets differential; Future  - Comprehensive metabolic panel; Future  - UA with Microscopic reflex to Culture; Future  - CT Abdomen Pelvis w Contrast; Future  - CBC with platelets differential  - Comprehensive metabolic panel  - UA with Microscopic reflex to Culture  - Urine Culture            BMI  Estimated body mass index is 25.2 kg/m  as calculated from the following:    Height as of this encounter: 1.626 m (5' 4\").    Weight as of this encounter: 66.6 kg (146 lb 12.8 oz).           Return in about 1 week (around 2/1/2024), or if symptoms worsen or fail to improve, for with primary provider.    Snow Jones is a 73 year old, presenting for the following health issues:  Abdominal Pain (RLQ)    HPI     Abdominal/Flank Pain  Onset/Duration: 1 week, worse at night  Description:   Character: Dull ache,  on and off  Location: right lower quadrant  Radiation: None  Intensity: 7/10 last night, 3/10 during the day  Progression of Symptoms:  worsening, intermittent, and waxing and waning  Accompanying Signs & Symptoms:  Fever/chills: YES- felt \"warm\" last night  Gas/Bloating: YES  Nausea: no   Vomitting: no   Diarrhea: YES- loose stools sometimes  Constipation:no   Dysuria: no            Hematuria: no            Frequency: YES           Incontinence of urine: no   History:            Last bowel " "movement: today  Trauma: no   Previous similar pain: no    Previous tests done: none           Previous Abdominal surgery: Yes-appendectomy, hysterectomy  Precipitating factors:   Does the pain change with:     Food: YES- sometimes     Bowel Movement: YES    Urination: no              Other factors: no   Therapies tried and outcome:  exercise, stretching, ibuprofen, no relief    When food last eaten: 6:30am    Patient is a 73-year-old female who has had right lower quadrant abdominal pain for about a week.  It is worsening the last 2 days.  It feels like a pulsating pain in the right lower quadrant.  It has been a 3 out of 10 for about a week.  It gets worse at night and the last 2 days it has been a 6-7 out of 10.  Right now currently in clinic it is a 1 out of 10.  She has had some night sweats.  She felt warm last night but she denies fevers and chills.  She thought she was having some weight loss but then her weight was the same at clinic today.  She had a normal bowel movement today.  No blood.  She does have hemorrhoids and will occasionally get blood with wiping.  No vaginal itching, discharge, odor.  No vaginal bleeding.  She denies nausea and vomiting.  She has no back pain.  Her appetite has been off and on.  She took ibuprofen 200 mg at 4 AM today    Previous surgeries include appendectomy, hysterectomy and 2 C-sections.  She believes she has 1 or 2 ovaries remaining.    She takes Protonix for reflux.      Review of Systems  Negative except as listed in HPI      Objective    BP (!) 154/98 (BP Location: Right arm, Patient Position: Sitting, Cuff Size: Adult Regular)   Pulse 76   Temp 97.7  F (36.5  C) (Tympanic)   Resp 16   Ht 1.626 m (5' 4\")   Wt 66.6 kg (146 lb 12.8 oz)   LMP  (LMP Unknown)   SpO2 96%   BMI 25.20 kg/m    Body mass index is 25.2 kg/m .  Physical Exam   Vitals noted and within normal limits except for elevated blood pressure today  In general she is alert, oriented, and in no acute " distress  Neck supple with no cervical adenopathy  Heart regular rate and rhythm with no murmurs  Lungs clear to auscultation bilaterally with good air entry  Back with no CVA tenderness  Abdomen soft and nondistended.  Positive tenderness to palpation in the right lower quadrant.  CBC: Within normal limits  CMP: Within normal limits  UA: No sign of infection  CT abdomen pelvis: Mild mesenteric fat stranding which is consistent from 2019 when the patient had diverticulitis.  No sign of diverticulitis today.  Results for orders placed or performed during the hospital encounter of 01/25/24   CT Abdomen Pelvis w Contrast     Status: None    Narrative    CT ABDOMEN AND PELVIS WITH CONTRAST 1/25/2024 12:19 PM    CLINICAL HISTORY: RLQ abdominal pain.    TECHNIQUE: CT scan of the abdomen and pelvis was performed following  injection of IV contrast. Multiplanar reformats were obtained. Dose  reduction techniques were used.    CONTRAST: 71mL Isovue-370    COMPARISON: 12/2/2019.    FINDINGS:   LOWER CHEST: Bibasilar atelectasis and/or scarring.    HEPATOBILIARY: Normal.    PANCREAS: Normal.    SPLEEN: Normal.    ADRENAL GLANDS: Normal.    KIDNEYS/BLADDER: No nephrolithiasis or hydronephrosis. No cystic or  solid renal mass. Urinary bladder is decompressed. Mild urinary  bladder wall thickening is noted, which may be accentuated by  underdistention. Retroaortic left renal vein.    BOWEL: Diverticulosis in the colon. No acute inflammatory change. No  obstruction.  A small hiatal hernia is noted.    PELVIC ORGANS: Uterus is absent. No pelvic mass or free pelvic fluid.    ADDITIONAL FINDINGS: There is similar-appearing nonspecific haziness  in the mesentery. No enlarged lymph nodes in the abdomen or pelvis.  Mild atherosclerosis of the abdominal aorta and iliac arteries. Tiny  fat-containing noninflamed umbilical hernia.    MUSCULOSKELETAL: Minimal hypertrophic degenerative changes of the  spine. No acute osseous abnormality.       Impression    IMPRESSION:   1.  Mild urinary bladder wall thickening, which may be accentuated by  underdistention. Consider correlation with urinalysis to exclude  cystitis if clinically indicated.  2.  Colonic diverticulosis without signs of diverticulitis.  3.  Hepatic steatosis.  4.  Similar-appearing nonspecific mesenteric fat stranding.    GAGANDEEP COOPER MD         SYSTEM ID:  [id number]   Results for orders placed or performed in visit on 01/25/24   Comprehensive metabolic panel     Status: Abnormal   Result Value Ref Range    Sodium 140 135 - 145 mmol/L    Potassium 4.5 3.4 - 5.3 mmol/L    Carbon Dioxide (CO2) 24 22 - 29 mmol/L    Anion Gap 10 7 - 15 mmol/L    Urea Nitrogen 15.7 8.0 - 23.0 mg/dL    Creatinine 0.82 0.51 - 0.95 mg/dL    GFR Estimate 75 >60 mL/min/1.73m2    Calcium 9.4 8.8 - 10.2 mg/dL    Chloride 106 98 - 107 mmol/L    Glucose 110 (H) 70 - 99 mg/dL    Alkaline Phosphatase 75 40 - 150 U/L    AST 25 0 - 45 U/L    ALT 25 0 - 50 U/L    Protein Total 7.0 6.4 - 8.3 g/dL    Albumin 4.4 3.5 - 5.2 g/dL    Bilirubin Total 0.3 <=1.2 mg/dL   UA with Microscopic reflex to Culture     Status: Abnormal    Specimen: Urine, Midstream   Result Value Ref Range    Color Urine Yellow Colorless, Straw, Light Yellow, Yellow    Appearance Urine Clear Clear    Glucose Urine Negative Negative mg/dL    Bilirubin Urine Negative Negative    Ketones Urine Negative Negative mg/dL    Specific Gravity Urine 1.025 1.003 - 1.035    Blood Urine Negative Negative    pH Urine 5.0 5.0 - 7.0    Protein Albumin Urine Negative Negative mg/dL    Urobilinogen Urine Normal Normal, 2.0 mg/dL    Nitrite Urine Negative Negative    Leukocyte Esterase Urine Moderate (A) Negative    Mucus Urine Present (A) None Seen /LPF    RBC Urine 1 <=2 /HPF    WBC Urine 3 <=5 /HPF    Squamous Epithelials Urine 3 (H) <=1 /HPF    Narrative    Urine Culture ordered based on laboratory criteria   CBC with platelets and differential     Status: None    Result Value Ref Range    WBC Count 6.1 4.0 - 11.0 10e3/uL    RBC Count 3.95 3.80 - 5.20 10e6/uL    Hemoglobin 12.3 11.7 - 15.7 g/dL    Hematocrit 37.6 35.0 - 47.0 %    MCV 95 78 - 100 fL    MCH 31.1 26.5 - 33.0 pg    MCHC 32.7 31.5 - 36.5 g/dL    RDW 12.8 10.0 - 15.0 %    Platelet Count 259 150 - 450 10e3/uL    % Neutrophils 58 %    % Lymphocytes 23 %    % Monocytes 9 %    % Eosinophils 8 %    % Basophils 1 %    % Immature Granulocytes 1 %    NRBCs per 100 WBC 0 <1 /100    Absolute Neutrophils 3.6 1.6 - 8.3 10e3/uL    Absolute Lymphocytes 1.4 0.8 - 5.3 10e3/uL    Absolute Monocytes 0.5 0.0 - 1.3 10e3/uL    Absolute Eosinophils 0.5 0.0 - 0.7 10e3/uL    Absolute Basophils 0.0 0.0 - 0.2 10e3/uL    Absolute Immature Granulocytes 0.0 <=0.4 10e3/uL    Absolute NRBCs 0.0 10e3/uL   CBC with platelets differential     Status: None    Narrative    The following orders were created for panel order CBC with platelets differential.  Procedure                               Abnormality         Status                     ---------                               -----------         ------                     CBC with platelets and d...[[id number]]                      Final result                 Please view results for these tests on the individual orders.                 Signed Electronically by: Mylene Madsen MD

## 2024-01-26 LAB — BACTERIA UR CULT: NO GROWTH

## 2024-02-06 ENCOUNTER — PATIENT OUTREACH (OUTPATIENT)
Dept: CARE COORDINATION | Facility: CLINIC | Age: 74
End: 2024-02-06
Payer: COMMERCIAL

## 2024-02-20 ENCOUNTER — PATIENT OUTREACH (OUTPATIENT)
Dept: CARE COORDINATION | Facility: CLINIC | Age: 74
End: 2024-02-20
Payer: COMMERCIAL

## 2024-03-29 DIAGNOSIS — E78.5 HYPERLIPIDEMIA LDL GOAL <130: ICD-10-CM

## 2024-03-29 RX ORDER — ATORVASTATIN CALCIUM 20 MG/1
TABLET, FILM COATED ORAL
Qty: 90 TABLET | Refills: 0 | Status: SHIPPED | OUTPATIENT
Start: 2024-03-29 | End: 2024-04-29

## 2024-04-06 DIAGNOSIS — I10 ESSENTIAL HYPERTENSION WITH GOAL BLOOD PRESSURE LESS THAN 140/90: ICD-10-CM

## 2024-04-08 RX ORDER — LOSARTAN POTASSIUM 50 MG/1
50 TABLET ORAL DAILY
Qty: 90 TABLET | Refills: 0 | Status: SHIPPED | OUTPATIENT
Start: 2024-04-08 | End: 2024-04-29

## 2024-04-08 NOTE — TELEPHONE ENCOUNTER
Has appointment scheduled for 4/29/24.      Requested Prescriptions   Pending Prescriptions Disp Refills    losartan (COZAAR) 50 MG tablet [Pharmacy Med Name: LOSARTAN POTASSIUM 50 MG TAB] 90 tablet 3     Sig: TAKE 1 TABLET BY MOUTH EVERY DAY       Angiotensin-II Receptors Failed - 4/6/2024  7:29 AM        Failed - Last blood pressure under 140/90 in past 12 months     BP Readings from Last 3 Encounters:   01/25/24 (!) 154/98   08/27/23 (!) 137/91   08/24/23 (!) 160/87       No data recorded            Passed - Medication is active on med list        Passed - Has GFR on file in past 12 months and most recent value is normal        Passed - Medication indicated for associated diagnosis     The medication is prescribed for one or more of the following conditions:    Chronic Kidney Disease (CDK)    Heart Failure (HF)    Diabetes, Nephropathy   Hypertension    Coronary Artery Disease (CAD)   Raynaud's Disease          Passed - Recent (12 mo) or future (90days) visit within the authorizing provider's specialty     The patient must have completed an in-person or virtual visit within the past 12 months or has a future visit scheduled within the next 90 days with the authorizing provider s specialty.  Urgent care and e-visits do not quality as an office visit for this protocol.          Passed - Patient is age 18 or older        Passed - No active pregnancy on record        Passed - Normal serum potassium on file in past 12 months     Recent Labs   Lab Test 01/25/24  1131   POTASSIUM 4.5                    Passed - No positive pregnancy test in past 12 months

## 2024-04-29 ENCOUNTER — ANCILLARY PROCEDURE (OUTPATIENT)
Dept: GENERAL RADIOLOGY | Facility: CLINIC | Age: 74
End: 2024-04-29
Attending: PHYSICIAN ASSISTANT
Payer: COMMERCIAL

## 2024-04-29 ENCOUNTER — OFFICE VISIT (OUTPATIENT)
Dept: FAMILY MEDICINE | Facility: CLINIC | Age: 74
End: 2024-04-29
Payer: COMMERCIAL

## 2024-04-29 VITALS
WEIGHT: 144 LBS | BODY MASS INDEX: 24.59 KG/M2 | DIASTOLIC BLOOD PRESSURE: 74 MMHG | OXYGEN SATURATION: 99 % | SYSTOLIC BLOOD PRESSURE: 128 MMHG | TEMPERATURE: 97.5 F | HEART RATE: 84 BPM | HEIGHT: 64 IN

## 2024-04-29 DIAGNOSIS — R10.31 RLQ ABDOMINAL PAIN: ICD-10-CM

## 2024-04-29 DIAGNOSIS — B00.1 RECURRENT COLD SORES: ICD-10-CM

## 2024-04-29 DIAGNOSIS — K21.00 GASTROESOPHAGEAL REFLUX DISEASE WITH ESOPHAGITIS WITHOUT HEMORRHAGE: ICD-10-CM

## 2024-04-29 DIAGNOSIS — Z00.00 ENCOUNTER FOR MEDICARE ANNUAL WELLNESS EXAM: Primary | ICD-10-CM

## 2024-04-29 DIAGNOSIS — E78.5 HYPERLIPIDEMIA LDL GOAL <130: ICD-10-CM

## 2024-04-29 DIAGNOSIS — I10 ESSENTIAL HYPERTENSION WITH GOAL BLOOD PRESSURE LESS THAN 140/90: ICD-10-CM

## 2024-04-29 LAB
ANION GAP SERPL CALCULATED.3IONS-SCNC: 13 MMOL/L (ref 7–15)
BUN SERPL-MCNC: 15.3 MG/DL (ref 8–23)
CALCIUM SERPL-MCNC: 9.7 MG/DL (ref 8.8–10.2)
CHLORIDE SERPL-SCNC: 106 MMOL/L (ref 98–107)
CHOLEST SERPL-MCNC: 207 MG/DL
CREAT SERPL-MCNC: 0.84 MG/DL (ref 0.51–0.95)
DEPRECATED HCO3 PLAS-SCNC: 25 MMOL/L (ref 22–29)
EGFRCR SERPLBLD CKD-EPI 2021: 73 ML/MIN/1.73M2
FASTING STATUS PATIENT QL REPORTED: YES
GLUCOSE SERPL-MCNC: 114 MG/DL (ref 70–99)
HDLC SERPL-MCNC: 77 MG/DL
LDLC SERPL CALC-MCNC: 115 MG/DL
NONHDLC SERPL-MCNC: 130 MG/DL
POTASSIUM SERPL-SCNC: 4.3 MMOL/L (ref 3.4–5.3)
SODIUM SERPL-SCNC: 144 MMOL/L (ref 135–145)
TRIGL SERPL-MCNC: 77 MG/DL

## 2024-04-29 PROCEDURE — 73502 X-RAY EXAM HIP UNI 2-3 VIEWS: CPT | Mod: TC | Performed by: RADIOLOGY

## 2024-04-29 PROCEDURE — 80048 BASIC METABOLIC PNL TOTAL CA: CPT | Performed by: PHYSICIAN ASSISTANT

## 2024-04-29 PROCEDURE — G0439 PPPS, SUBSEQ VISIT: HCPCS | Performed by: PHYSICIAN ASSISTANT

## 2024-04-29 PROCEDURE — 99213 OFFICE O/P EST LOW 20 MIN: CPT | Mod: 25 | Performed by: PHYSICIAN ASSISTANT

## 2024-04-29 PROCEDURE — 80061 LIPID PANEL: CPT | Performed by: PHYSICIAN ASSISTANT

## 2024-04-29 PROCEDURE — 36415 COLL VENOUS BLD VENIPUNCTURE: CPT | Performed by: PHYSICIAN ASSISTANT

## 2024-04-29 RX ORDER — VALACYCLOVIR HYDROCHLORIDE 1 G/1
2000 TABLET, FILM COATED ORAL 2 TIMES DAILY
Qty: 60 TABLET | Refills: 1 | Status: SHIPPED | OUTPATIENT
Start: 2024-04-29 | End: 2024-09-13

## 2024-04-29 RX ORDER — LOSARTAN POTASSIUM 50 MG/1
50 TABLET ORAL DAILY
Qty: 90 TABLET | Refills: 3 | Status: SHIPPED | OUTPATIENT
Start: 2024-04-29

## 2024-04-29 RX ORDER — ATORVASTATIN CALCIUM 20 MG/1
20 TABLET, FILM COATED ORAL DAILY
Qty: 90 TABLET | Refills: 3 | Status: SHIPPED | OUTPATIENT
Start: 2024-04-29

## 2024-04-29 RX ORDER — PANTOPRAZOLE SODIUM 40 MG/1
40 TABLET, DELAYED RELEASE ORAL DAILY
Qty: 90 TABLET | Refills: 3 | Status: SHIPPED | OUTPATIENT
Start: 2024-04-29

## 2024-04-29 SDOH — HEALTH STABILITY: PHYSICAL HEALTH: ON AVERAGE, HOW MANY DAYS PER WEEK DO YOU ENGAGE IN MODERATE TO STRENUOUS EXERCISE (LIKE A BRISK WALK)?: 2 DAYS

## 2024-04-29 ASSESSMENT — SOCIAL DETERMINANTS OF HEALTH (SDOH): HOW OFTEN DO YOU GET TOGETHER WITH FRIENDS OR RELATIVES?: MORE THAN THREE TIMES A WEEK

## 2024-04-29 NOTE — PATIENT INSTRUCTIONS
Preventive Care Advice   This is general advice given by our system to help you stay healthy. However, your care team may have specific advice just for you. Please talk to your care team about your preventive care needs.  Nutrition  Eat 5 or more servings of fruits and vegetables each day.  Try wheat bread, brown rice and whole grain pasta (instead of white bread, rice, and pasta).  Get enough calcium and vitamin D. Check the label on foods and aim for 100% of the RDA (recommended daily allowance).  Lifestyle  Exercise at least 150 minutes each week   (30 minutes a day, 5 days a week).  Do muscle strengthening activities 2 days a week. These help control your weight and prevent disease.  No smoking.  Wear sunscreen to prevent skin cancer.  Have a dental exam and cleaning every 6 months.  Yearly exams  See your health care team every year to talk about:  Any changes in your health.  Any medicines your care team has prescribed.  Preventive care, family planning, and ways to prevent chronic diseases.  Shots (vaccines)   HPV shots (up to age 26), if you've never had them before.  Hepatitis B shots (up to age 59), if you've never had them before.  COVID-19 shot: Get this shot when it's due.  Flu shot: Get a flu shot every year.  Tetanus shot: Get a tetanus shot every 10 years.  Pneumococcal, hepatitis A, and RSV shots: Ask your care team if you need these based on your risk.  Shingles shot (for age 50 and up).  General health tests  Diabetes screening:  Starting at age 35, Get screened for diabetes at least every 3 years.  If you are younger than age 35, ask your care team if you should be screened for diabetes.  Cholesterol test: At age 39, start having a cholesterol test every 5 years, or more often if advised.  Bone density scan (DEXA): At age 50, ask your care team if you should have this scan for osteoporosis (brittle bones).  Hepatitis C: Get tested at least once in your life.  STIs (sexually transmitted  infections)  Before age 24: Ask your care team if you should be screened for STIs.  After age 24: Get screened for STIs if you're at risk. You are at risk for STIs (including HIV) if:  You are sexually active with more than one person.  You don't use condoms every time.  You or a partner was diagnosed with a sexually transmitted infection.  If you are at risk for HIV, ask about PrEP medicine to prevent HIV.  Get tested for HIV at least once in your life, whether you are at risk for HIV or not.  Cancer screening tests  Cervical cancer screening: If you have a cervix, begin getting regular cervical cancer screening tests at age 21. Most people who have regular screenings with normal results can stop after age 65. Talk about this with your provider.  Breast cancer scan (mammogram): If you've ever had breasts, begin having regular mammograms starting at age 40. This is a scan to check for breast cancer.  Colon cancer screening: It is important to start screening for colon cancer at age 45.  Have a colonoscopy test every 10 years (or more often if you're at risk) Or, ask your provider about stool tests like a FIT test every year or Cologuard test every 3 years.  To learn more about your testing options, visit: https://www.[employer]/406622.pdf.  For help making a decision, visit: https://bit.ly/mn11359.  Prostate cancer screening test: If you have a prostate and are age 55 to 69, ask your provider if you would benefit from a yearly prostate cancer screening test.  Lung cancer screening: If you are a current or former smoker age 50 to 80, ask your care team if ongoing lung cancer screenings are right for you.  For informational purposes only. Not to replace the advice of your health care provider. Copyright   2023 [employer] All rights reserved. Clinically reviewed by the [employer] [facility] Transitions Program. MarketG[employer] 662117 - REV 01/24.    Hearing Loss: Care Instructions  Overview     Hearing loss is a  sudden or slow decrease in how well you hear. It can range from slight to profound. Permanent hearing loss can occur with aging. It also can happen when you are exposed long-term to loud noise. Examples include listening to loud music, riding motorcycles, or being around other loud machines.  Hearing loss can affect your work and home life. It can make you feel lonely or depressed. You may feel that you have lost your independence. But hearing aids and other devices can help you hear better and feel connected to others.  Follow-up care is a key part of your treatment and safety. Be sure to make and go to all appointments, and call your doctor if you are having problems. It's also a good idea to know your test results and keep a list of the medicines you take.  How can you care for yourself at home?  Avoid loud noises whenever possible. This helps keep your hearing from getting worse.  Always wear hearing protection around loud noises.  Wear a hearing aid as directed.  A professional can help you pick a hearing aid that will work best for you.  You can also get hearing aids over the counter for mild to moderate hearing loss.  Have hearing tests as your doctor suggests. They can show whether your hearing has changed. Your hearing aid may need to be adjusted.  Use other devices as needed. These may include:  Telephone amplifiers and hearing aids that can connect to a television, stereo, radio, or microphone.  Devices that use lights or vibrations. These alert you to the doorbell, a ringing telephone, or a baby monitor.  Television closed-captioning. This shows the words at the bottom of the screen. Most new TVs can do this.  TTY (text telephone). This lets you type messages back and forth on the telephone instead of talking or listening. These devices are also called TDD. When messages are typed on the keyboard, they are sent over the phone line to a receiving TTY. The message is shown on a monitor.  Use text  "messaging, social media, and email if it is hard for you to communicate by telephone.  Try to learn a listening technique called speechreading. It is not lipreading. You pay attention to people's gestures, expressions, posture, and tone of voice. These clues can help you understand what a person is saying. Face the person you are talking to, and have them face you. Make sure the lighting is good. You need to see the other person's face clearly.  Think about counseling if you need help to adjust to your hearing loss.  When should you call for help?  Watch closely for changes in your health, and be sure to contact your doctor if:    You think your hearing is getting worse.     You have new symptoms, such as dizziness or nausea.   Where can you learn more?  Go to https://www.[employer]net/patiented  Enter R798 in the search box to learn more about \"Hearing Loss: Care Instructions.\"  Current as of: September 27, 2023               Content Version: 14.0    5583-9751 [employer].   Care instructions adapted under license by your healthcare professional. If you have questions about a medical condition or this instruction, always ask your healthcare professional. [employer] disclaims any warranty or liability for your use of this information.      Substance Use Disorder: Care Instructions  Overview     You can improve your life and health by stopping your use of alcohol or drugs. When you don't drink or use drugs, you may feel and sleep better. You may get along better with your family, friends, and coworkers. There are medicines and programs that can help with substance use disorder.  How can you care for yourself at home?  Here are some ways to help you stay sober and prevent relapse.  If you have been given medicine to help keep you sober or reduce your cravings, be sure to take it exactly as prescribed.  Talk to your doctor about programs that can help you stop using drugs or drinking " alcohol.  Do not keep alcohol or drugs in your home.  Plan ahead. Think about what you'll say if other people ask you to drink or use drugs. Try not to spend time with people who drink or use drugs.  Use the time and money spent on drinking or drugs to do something that's important to you.  Preventing a relapse  Have a plan to deal with relapse. Learn to recognize changes in your thinking that lead you to drink or use drugs. Get help before you start to drink or use drugs again.  Try to stay away from situations, friends, or places that may lead you to drink or use drugs.  If you feel the need to drink alcohol or use drugs again, seek help right away. Call a trusted friend or family member. Some people get support from organizations such as Narcotics Anonymous or [employer] or from treatment facilities.  If you relapse, get help as soon as you can. Some people make a plan with another person that outlines what they want that person to do for them if they relapse. The plan usually includes how to handle the relapse and who to notify in case of relapse.  Don't give up. Remember that a relapse doesn't mean that you have failed. Use the experience to learn the triggers that lead you to drink or use drugs. Then quit again. Recovery is a lifelong process. Many people have several relapses before they are able to quit for good.  Follow-up care is a key part of your treatment and safety. Be sure to make and go to all appointments, and call your doctor if you are having problems. It's also a good idea to know your test results and keep a list of the medicines you take.  When should you call for help?   Call 911  anytime you think you may need emergency care. For example, call if you or someone else:    Has overdosed or has withdrawal signs. Be sure to tell the emergency workers that you are or someone else is using or trying to quit using drugs. Overdose or withdrawal signs may include:  Losing  "consciousness.  Seizure.  Seeing or hearing things that aren't there (hallucinations).     Is thinking or talking about suicide or harming others.   Where to get help 24 hours a day, 7 days a week   If you or someone you know talks about suicide, self-harm, a mental health crisis, a substance use crisis, or any other kind of emotional distress, get help right away. You can:    Call the Suicide and Crisis Lifeline at 988.     Call 8-133-630-TALK (1-240.396.5115).     Text HOME to 871724 to access the Crisis Text Line.   Consider saving these numbers in your phone.  Go to [employer] for more information or to chat online.  Call your doctor now or seek immediate medical care if:    You are having withdrawal symptoms. These may include nausea or vomiting, sweating, shakiness, and anxiety.   Watch closely for changes in your health, and be sure to contact your doctor if:    You have a relapse.     You need more help or support to stop.   Where can you learn more?  Go to https://www.[employer].net/patiented  Enter H573 in the search box to learn more about \"Substance Use Disorder: Care Instructions.\"  Current as of: November 15, 2023               Content Version: 14.0    4157-0752 [employer].   Care instructions adapted under license by your healthcare professional. If you have questions about a medical condition or this instruction, always ask your healthcare professional. [employer] disclaims any warranty or liability for your use of this information.      "

## 2024-04-29 NOTE — PROGRESS NOTES
Preventive Care Visit  [facility] SUMAN Aguilar PA-C, Family Medicine  Apr 29, 2024      Assessment & Plan     (Z00.00) Encounter for Medicare annual wellness exam  (primary encounter diagnosis)  Comment:   Plan:     (E78.5) Hyperlipidemia LDL goal <130  Comment:   Plan: Lipid panel reflex to direct LDL Non-fasting,         atorvastatin (LIPITOR) 20 MG tablet            (B00.1) Recurrent cold sores  Comment:   Plan: valACYclovir (VALTREX) 1000 mg tablet            (K21.00) Gastroesophageal reflux disease with esophagitis without hemorrhage  Comment:   Plan: pantoprazole (PROTONIX) 40 MG EC tablet            (I10) Essential hypertension with goal blood pressure less than 140/90  Comment: well controlled today  Plan: losartan (COZAAR) 50 MG tablet, Basic metabolic        panel  (Ca, Cl, CO2, Creat, Gluc, K, Na, BUN)            (R10.31) RLQ abdominal pain  Comment: unclear etiology but reassuring is that it is not worsening and has been unchanged since she noticed it in the fall. Labs and imaging (CT abdomen/pelvis) in Jan were normal. Given pain location/description (improvement with movement) ? A musculoskeletal component although unclear source? Hip Xray without significant arthritis and pain is not radicular. Pain on exam over posterior iliac crest could be just 'normal' discomfort from her walking/active lifestyle given it is on both sides. Could consider pelvic  ultrasound if pain persists/worsens or other new/different symptoms but at this point favor just keeping a close eye on things given work up thus far normal and pain is not limiting her activities or sleep at this point  Plan: XR Hip Right 2-3 Views                Patient has been advised of split billing requirements and indicates understanding: Yes      Counseling  Appropriate preventive services were discussed with this patient, including applicable screening as appropriate for fall prevention, nutrition, physical activity,  Tobacco-use cessation, weight loss and cognition.  Checklist reviewing preventive services available has been given to the patient.  Reviewed patient's diet, addressing concerns and/or questions.   She is at risk for lack of exercise and has been provided with information to increase physical activity for the benefit of her well-being.   The patient was provided with written information regarding signs of hearing loss.         Snow Jones is a 74 year old, presenting for the following:  Physical    Continues to deal with RLQ pain  Was seen in the [facility] with same concern in Jan - labs and CT were done. No acute finding or explanation for pain    Pain comes and goes  She believes it started sometime in the fall - not bothersome enough that she felt she needed to come in but was going to [place] for 3 weeks in Feb so wanted to have it checked out before she went  Notes while in [place] she didn't really notice it, then started back up when she got home and has continued over the last month    Nothing different in [place] - took all her normal medications  No supplements or vitamins that she was on that she stopped while in [place] to explain improvement those 3 weeks    Not present everyday  Has woken up at night and the pain will be there - doesn't think the pain actually wakes her (is usually going to the bathroom)  Moving around (walking around the room) usually gets the pain to subside    During the day seems to get better although not sure if that's just because she is busy and not paying attention to it or if its really better/not there    Seems unrelated to diet, stooling, urinating     H/o appendectomy, hysterectomy (she believes at least 1 or both ovaries were left), and 2 c sections    Pain is more of an 'ache' that she feels in the right lower abdomen as well as the back/posterior hip        4/29/2024     8:10 AM   Additional Questions   Roomed by SIMRAN [place]       Health Care Directive  Patient  does not have a Health Care Directive or Living Will: Discussed advance care planning with patient; however, patient declined at this time.    HPI          4/29/2024   General Health   How would you rate your overall physical health? Good   Feel stress (tense, anxious, or unable to sleep) Not at all         4/29/2024   Nutrition   Diet: Regular (no restrictions)         4/29/2024   Exercise   Days per week of moderate/strenous exercise 2 days   (!) EXERCISE CONCERN      4/29/2024   Social Factors   Frequency of gathering with friends or relatives More than three times a week   Worry food won't last until get money to buy more No   Food not last or not have enough money for food? No   Do you have housing?  Yes   Are you worried about losing your housing? No   Lack of transportation? No   Unable to get utilities (heat,electricity)? No         4/29/2024   Fall Risk   Fallen 2 or more times in the past year? No   Trouble with walking or balance? No          4/29/2024   Activities of Daily Living- Home Safety   Needs help with the following daily activites None of the above   Safety concerns in the home None of the above         4/29/2024   Dental   Dentist two times every year? Yes         4/29/2024   Hearing Screening   Hearing concerns? (!) TROUBLE UNDERSTANDING SOFT OR WHISPERED SPEECH.         4/29/2024   Driving Risk Screening   Patient/family members have concerns about driving No         4/29/2024   General Alertness/Fatigue Screening   Have you been more tired than usual lately? No         4/29/2024   Urinary Incontinence Screening   Bothered by leaking urine in past 6 months No         4/29/2024   TB Screening   Were you born outside of the [place]? No         Today's PHQ-2 Score:       4/29/2024     8:09 AM   PHQ-2 ( 1999 [employer])   Q1: Little interest or pleasure in doing things 0   Q2: Feeling down, depressed or hopeless 0   PHQ-2 Score 0   Q1: Little interest or pleasure in doing things Not at all   Q2: Feeling  down, depressed or hopeless Not at all   PHQ-2 Score 0           4/29/2024   Substance Use   Alcohol more than 3/day or more than 7/wk No   Do you have a current opioid prescription? No   How severe/bad is pain from 1 to 10? 0/10 (No Pain)   Do you use any other substances recreationally? (!) ALCOHOL     Social History     Tobacco Use    Smoking status: Never    Smokeless tobacco: Never   Vaping Use    Vaping status: Never Used   Substance Use Topics    Alcohol use: Yes     Comment: Social-- 6/wk    Drug use: No           1/9/2024   LAST FHS-7 RESULTS   1st degree relative breast or ovarian cancer Yes   Any relative bilateral breast cancer No   Any male have breast cancer No   Any ONE woman have BOTH breast AND ovarian cancer No   Any woman with breast cancer before 50yrs No   2 or more relatives with breast AND/OR ovarian cancer No   2 or more relatives with breast AND/OR bowel cancer No       Mammogram Screening - Mammogram every 1-2 years updated in Health Maintenance based on mutual decision making    ASCVD Risk   The 10-year ASCVD risk score (Tiffanie ORR, et al., 2019) is: 18.8%    Values used to calculate the score:      Age: 74 years      Sex: Female      Is Non- : No      Diabetic: No      Tobacco smoker: No      Systolic Blood Pressure: 128 mmHg      Is BP treated: Yes      HDL Cholesterol: 79 mg/dL      Total Cholesterol: 192 mg/dL        Reviewed and updated as needed this visit by Provider                    BP Readings from Last 3 Encounters:   04/29/24 128/74   01/25/24 (!) 154/98   08/27/23 (!) 137/91    Wt Readings from Last 3 Encounters:   04/29/24 65.3 kg (144 lb)   01/25/24 66.6 kg (146 lb 12.8 oz)   08/24/23 66.4 kg (146 lb 6.4 oz)                  Current providers sharing in care for this patient include:  Patient Care Team:  Delphine Aguilar PA-C as PCP - General (Family Medicine)  Delphine Aguilar PA-C as Assigned PCP  Leona Cage  "DEMIAN as Physician Assistant (Dermatology)  Leona Cage PA-C as Assigned Surgical Provider    The following health maintenance items are reviewed in Epic and correct as of today:  Health Maintenance   Topic Date Due    ANNUAL REVIEW OF HM ORDERS  08/30/2023    COVID-19 Vaccine (8 - 2023-24 season) 02/23/2024    LIPID  03/07/2024    DEXA  01/20/2025    MEDICARE ANNUAL WELLNESS VISIT  04/29/2025    FALL RISK ASSESSMENT  04/29/2025    MAMMO SCREENING  01/09/2026    ADVANCE CARE PLANNING  05/25/2026    GLUCOSE  01/25/2027    COLORECTAL CANCER SCREENING  07/24/2028    DTAP/TDAP/TD IMMUNIZATION (3 - Td or Tdap) 08/24/2033    HEPATITIS C SCREENING  Completed    PHQ-2 (once per calendar year)  Completed    INFLUENZA VACCINE  Completed    Pneumococcal Vaccine: 65+ Years  Completed    ZOSTER IMMUNIZATION  Completed    RSV VACCINE (Pregnancy & 60+)  Completed    IPV IMMUNIZATION  Aged Out    HPV IMMUNIZATION  Aged Out    MENINGITIS IMMUNIZATION  Aged Out    RSV MONOCLONAL ANTIBODY  Aged Out         Review of Systems  CONSTITUTIONAL: NEGATIVE for fever, chills, change in weight  INTEGUMENTARY/SKIN: NEGATIVE for worrisome rashes, moles or lesions  EYES: NEGATIVE for vision changes or irritation  ENT/MOUTH: NEGATIVE for ear, mouth and throat problems  RESP: NEGATIVE for significant cough or SOB  BREAST: NEGATIVE for masses, tenderness or discharge  CV: NEGATIVE for chest pain, palpitations or peripheral edema  GI: NEGATIVE for nausea, abdominal pain, heartburn, or change in bowel habits  : NEGATIVE for frequency, dysuria, or hematuria  MUSCULOSKELETAL: NEGATIVE for significant arthralgias or myalgia  NEURO: NEGATIVE for weakness, dizziness or paresthesias  ENDOCRINE: NEGATIVE for temperature intolerance, skin/hair changes  HEME: NEGATIVE for bleeding problems  PSYCHIATRIC: NEGATIVE for changes in mood or affect     Objective    Exam  /74   Pulse 84   Temp 97.5  F (36.4  C) (Tympanic)   Ht 1.626 m (5' 4\")   Wt " "65.3 kg (144 lb)   LMP  (LMP Unknown)   SpO2 99%   BMI 24.72 kg/m     Estimated body mass index is 24.72 kg/m  as calculated from the following:    Height as of this encounter: 1.626 m (5' 4\").    Weight as of this encounter: 65.3 kg (144 lb).    Physical Exam  GENERAL: alert and no distress  EYES: Eyes grossly normal to inspection, PERRL and conjunctivae and sclerae normal  HENT: ear canals and TM's normal, nose and mouth without ulcers or lesions  NECK: no adenopathy, no asymmetry, masses, or scars  RESP: lungs clear to auscultation - no rales, rhonchi or wheezes  CV: regular rate and rhythm, normal S1 S2, no S3 or S4, no murmur, click or rub, no peripheral edema  ABDOMEN: soft, nontender, no hepatosplenomegaly, no masses and bowel sounds normal  Mild discomfort with deep palpation in the RLQ but also notes similar discomfort in the LLQ. SAme goes for palpation over the posterior iliac crest - discomfort with firm palpation on BOTH sides  MS: no gross musculoskeletal defects noted, no edema  SKIN: no suspicious lesions or rashes  NEURO: Normal strength and tone, mentation intact and speech normal  PSYCH: mentation appears normal, affect normal/bright    Diagnostic Tests:   Labs pending  Hip xray, right: mild normal degenerative changes, no acute pathology on independent review pending final read from radiology         4/29/2024   Mini Cog   Clock Draw Score 2 Normal   3 Item Recall 3 objects recalled   Mini Cog Total Score 5            Signed Electronically by: Delphine Aguilar PA-C    "

## 2024-06-10 ENCOUNTER — OFFICE VISIT (OUTPATIENT)
Dept: DERMATOLOGY | Facility: CLINIC | Age: 74
End: 2024-06-10
Payer: COMMERCIAL

## 2024-06-10 DIAGNOSIS — L82.1 SEBORRHEIC KERATOSIS: ICD-10-CM

## 2024-06-10 DIAGNOSIS — L81.4 LENTIGO: ICD-10-CM

## 2024-06-10 DIAGNOSIS — L82.0 INFLAMED SEBORRHEIC KERATOSIS: ICD-10-CM

## 2024-06-10 DIAGNOSIS — D18.01 ANGIOMA OF SKIN: ICD-10-CM

## 2024-06-10 DIAGNOSIS — D22.9 NEVUS: Primary | ICD-10-CM

## 2024-06-10 PROCEDURE — 17111 DESTRUCTION B9 LESIONS 15/>: CPT | Performed by: PHYSICIAN ASSISTANT

## 2024-06-10 PROCEDURE — 99213 OFFICE O/P EST LOW 20 MIN: CPT | Mod: 25 | Performed by: PHYSICIAN ASSISTANT

## 2024-06-10 NOTE — PROGRESS NOTES
HPI:   Chief complaints: Karen Hernandez is a 74 year old female who presents for Full skin cancer screening to rule out skin cancer   Last Skin Exam: 2 years ago      1st Baseline: no  Personal HX of Skin Cancer: no   Personal HX of Malignant Melanoma: no   Family HX of Skin Cancer / Malignant Melanoma: no  Personal HX of Atypical Moles:   no  Risk factors: lots of sun exposure; was a  in the past; history of blistering sunburns in the past.   New / Changing lesions:yes several irritated areas  Social History:   On review of systems, there are no further skin complaints, patient is feeling otherwise well.  See patient intake sheet.  ROS of the following were done and are negative: Constitutional, Eyes, Ears, Nose,   Mouth, Throat, Cardiovascular, Respiratory, GI, Genitourinary, Musculoskeletal,   Psychiatric, Endocrine, Allergic/Immunologic.    PHYSICAL EXAM:   LMP  (LMP Unknown)   Skin exam performed as follows: Type 2 skin. Mood appropriate  Alert and Oriented X 3. Well developed, well nourished in no distress.  General appearance: Normal  Head including face: Normal  Eyes: conjunctiva and lids: Normal  Mouth: Lips, teeth, gums: Normal  Neck: Normal  Chest-breast/axillae: Normal  Back: Normal  Spleen and liver: Normal  Cardiovascular: Exam of peripheral vascular system by observation for swelling, varicosities, edema: Normal  Genitalia: groin, buttocks: Normal  Extremities: digits/nails (clubbing): Normal  Eccrine and Apocrine glands: Normal  Right upper extremity: Normal  Left upper extremity: Normal  Right lower extremity: Normal  Left lower extremity: Normal  Skin: Scalp and body hair: See below    Pt deferred exam of breasts, groin, buttocks: No    Other physical findings:  1. Multiple pigmented macules on extremities and trunk  2. Multiple pigmented macules on face, trunk and extremities  3. Multiple vascular papules on trunk, arms and legs  4. Multiple scattered keratotic plaques  5.  Inflamed keratotic papules on the left lower eyelid x 1, neck x 10, axillae x 5, right lower leg x 1, right thigh x 1         Except as noted above, no other signs of skin cancer or melanoma.     ASSESSMENT/PLAN:   Benign Full skin cancer screening today. . Patient with history of none  Advised on monthly self exams and 1 year  Patient Education: Appropriate brochures given.    Multiple benign appearing nevi on arms, legs and trunk. Discussed ABCDEs of melanoma and sunscreen.   Multiple lentigos on arms, legs and trunk. Advised benign, no treatment needed.  Multiple scattered angiomas. Advised benign, no treatment needed.   Seborrheic keratosis on arms, legs and trunk. Advised benign, no treatment needed.  Inflamed seborrheic keratosis on the left lower eyelid x 1, neck x 10, axillae x 5, right lower leg x 1, right thigh x 1. As physically tender cryosurgery performed. Advised on post op care.                 Follow-up: yearly FSE/PRN sooner    1.) Patient was asked about new and changing moles. YES  2.) Patient received a complete physical skin examination: YES  3.) Patient was counseled to perform a monthly self skin examination: YES  Scribed By: Leona Cage, MS, PAJOHANNY

## 2024-06-10 NOTE — LETTER
6/10/2024      Karen Hernandez  [address] [place] [id number]      Dear Colleague,    Thank you for referring your patient, Karen Hernandez, to the [facility]. Please see a copy of my visit note below.    HPI:   Chief complaints: Karen Hernandez is a 74 year old female who presents for Full skin cancer screening to rule out skin cancer   Last Skin Exam: 2 years ago      1st Baseline: no  Personal HX of Skin Cancer: no   Personal HX of Malignant Melanoma: no   Family HX of Skin Cancer / Malignant Melanoma: no  Personal HX of Atypical Moles:   no  Risk factors: lots of sun exposure; was a  in the past; history of blistering sunburns in the past.   New / Changing lesions:yes several irritated areas  Social History:   On review of systems, there are no further skin complaints, patient is feeling otherwise well.  See patient intake sheet.  ROS of the following were done and are negative: Constitutional, Eyes, Ears, Nose,   Mouth, Throat, Cardiovascular, Respiratory, GI, Genitourinary, Musculoskeletal,   Psychiatric, Endocrine, Allergic/Immunologic.    PHYSICAL EXAM:   LMP  (LMP Unknown)   Skin exam performed as follows: Type 2 skin. Mood appropriate  Alert and Oriented X 3. Well developed, well nourished in no distress.  General appearance: Normal  Head including face: Normal  Eyes: conjunctiva and lids: Normal  Mouth: Lips, teeth, gums: Normal  Neck: Normal  Chest-breast/axillae: Normal  Back: Normal  Spleen and liver: Normal  Cardiovascular: Exam of peripheral vascular system by observation for swelling, varicosities, edema: Normal  Genitalia: groin, buttocks: Normal  Extremities: digits/nails (clubbing): Normal  Eccrine and Apocrine glands: Normal  Right upper extremity: Normal  Left upper extremity: Normal  Right lower extremity: Normal  Left lower extremity: Normal  Skin: Scalp and body hair: See below    Pt deferred exam of breasts, groin, buttocks:  No    Other physical findings:  1. Multiple pigmented macules on extremities and trunk  2. Multiple pigmented macules on face, trunk and extremities  3. Multiple vascular papules on trunk, arms and legs  4. Multiple scattered keratotic plaques  5. Inflamed keratotic papules on the left lower eyelid x 1, neck x 10, axillae x 5, right lower leg x 1, right thigh x 1         Except as noted above, no other signs of skin cancer or melanoma.     ASSESSMENT/PLAN:   Benign Full skin cancer screening today. . Patient with history of none  Advised on monthly self exams and 1 year  Patient Education: Appropriate brochures given.    Multiple benign appearing nevi on arms, legs and trunk. Discussed ABCDEs of melanoma and sunscreen.   Multiple lentigos on arms, legs and trunk. Advised benign, no treatment needed.  Multiple scattered angiomas. Advised benign, no treatment needed.   Seborrheic keratosis on arms, legs and trunk. Advised benign, no treatment needed.  Inflamed seborrheic keratosis on the left lower eyelid x 1, neck x 10, axillae x 5, right lower leg x 1, right thigh x 1. As physically tender cryosurgery performed. Advised on post op care.                 Follow-up: yearly FSE/PRN sooner    1.) Patient was asked about new and changing moles. YES  2.) Patient received a complete physical skin examination: YES  3.) Patient was counseled to perform a monthly self skin examination: YES  Scribed By: Leona Cage MS, PAJOHANNY        Again, thank you for allowing me to participate in the care of your patient.        Sincerely,        Leona Cage PA-C

## 2024-06-17 PROBLEM — Z71.89 ADVANCED DIRECTIVES, COUNSELING/DISCUSSION: Status: RESOLVED | Noted: 2017-10-17 | Resolved: 2024-06-17

## 2024-09-13 DIAGNOSIS — B00.1 RECURRENT COLD SORES: ICD-10-CM

## 2024-09-13 RX ORDER — VALACYCLOVIR HYDROCHLORIDE 1 G/1
2000 TABLET, FILM COATED ORAL 2 TIMES DAILY
Qty: 60 TABLET | Refills: 1 | Status: SHIPPED | OUTPATIENT
Start: 2024-09-13

## 2024-09-23 DIAGNOSIS — B00.1 RECURRENT COLD SORES: ICD-10-CM

## 2024-09-23 RX ORDER — VALACYCLOVIR HYDROCHLORIDE 1 G/1
2000 TABLET, FILM COATED ORAL 2 TIMES DAILY
Qty: 60 TABLET | Refills: 1 | OUTPATIENT
Start: 2024-09-23

## 2024-09-26 DIAGNOSIS — B00.1 RECURRENT COLD SORES: ICD-10-CM

## 2024-09-26 RX ORDER — VALACYCLOVIR HYDROCHLORIDE 1 G/1
2000 TABLET, FILM COATED ORAL 2 TIMES DAILY
Qty: 60 TABLET | Refills: 1 | OUTPATIENT
Start: 2024-09-26

## 2025-01-15 ENCOUNTER — HOSPITAL ENCOUNTER (OUTPATIENT)
Dept: MAMMOGRAPHY | Facility: CLINIC | Age: 75
Discharge: HOME | End: 2025-01-15
Attending: PHYSICIAN ASSISTANT
Payer: COMMERCIAL

## 2025-01-15 DIAGNOSIS — Z12.31 SCREENING MAMMOGRAM, ENCOUNTER FOR: ICD-10-CM

## 2025-01-15 PROCEDURE — 77067 SCR MAMMO BI INCL CAD: CPT

## 2025-01-15 PROCEDURE — 77063 BREAST TOMOSYNTHESIS BI: CPT

## 2025-02-20 ENCOUNTER — TELEPHONE (OUTPATIENT)
Dept: FAMILY MEDICINE | Facility: CLINIC | Age: 75
End: 2025-02-20
Payer: COMMERCIAL

## 2025-02-20 NOTE — TELEPHONE ENCOUNTER
Karen states she is in [place] and tripped and fell on a sidewalk 2 weeks and hit her brow bone. States she did not have a concussion but was pretty swollen and bruised. Everything else is ok except she still has swelling in her brow bone and it is . She is wanting to see Delphine when she returns so I scheduled her for 2/28/25 as she does not come home until 2/26/25 at night time. Advised once she is home if it seems to be getting worse she is to be seen in UC/ER. She agrees with this plan.    Shirlene Silva RN

## 2025-02-28 ENCOUNTER — ANCILLARY PROCEDURE (OUTPATIENT)
Dept: GENERAL RADIOLOGY | Facility: CLINIC | Age: 75
End: 2025-02-28
Attending: PHYSICIAN ASSISTANT
Payer: COMMERCIAL

## 2025-02-28 DIAGNOSIS — H02.844 SWELLING OF LEFT UPPER EYELID: ICD-10-CM
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
## 2025-02-28 PROCEDURE — 70200 X-RAY EXAM OF EYE SOCKETS: CPT | Mod: TC | Performed by: RADIOLOGY

## 2025-03-21 ENCOUNTER — APPOINTMENT (OUTPATIENT)
Dept: CT IMAGING | Facility: HOSPITAL | Age: 75
End: 2025-03-21
Payer: COMMERCIAL

## 2025-03-21 ENCOUNTER — HOSPITAL ENCOUNTER (EMERGENCY)
Facility: HOSPITAL | Age: 75
Discharge: HOME | End: 2025-03-21
Attending: EMERGENCY MEDICINE | Admitting: EMERGENCY MEDICINE
Payer: COMMERCIAL

## 2025-03-21 VITALS
SYSTOLIC BLOOD PRESSURE: 163 MMHG | DIASTOLIC BLOOD PRESSURE: 92 MMHG | RESPIRATION RATE: 16 BRPM | TEMPERATURE: 97.9 F | OXYGEN SATURATION: 97 % | HEART RATE: 89 BPM

## 2025-03-21 DIAGNOSIS — N20.1 LEFT URETERAL STONE: ICD-10-CM

## 2025-03-21 LAB
ALBUMIN SERPL BCG-MCNC: 4.6 G/DL (ref 3.5–5.2)
ALBUMIN UR-MCNC: 20 MG/DL
ALP SERPL-CCNC: 89 U/L (ref 40–150)
ALT SERPL W P-5'-P-CCNC: 27 U/L (ref 0–50)
ANION GAP SERPL CALCULATED.3IONS-SCNC: 13 MMOL/L (ref 7–15)
APPEARANCE UR: CLEAR
AST SERPL W P-5'-P-CCNC: 31 U/L (ref 0–45)
BASOPHILS # BLD AUTO: 0 10E3/UL (ref 0–0.2)
BASOPHILS NFR BLD AUTO: 0 %
BILIRUB DIRECT SERPL-MCNC: 0.11 MG/DL (ref 0–0.3)
BILIRUB SERPL-MCNC: 0.3 MG/DL
BILIRUB UR QL STRIP: NEGATIVE
BUN SERPL-MCNC: 14.2 MG/DL (ref 8–23)
CALCIUM SERPL-MCNC: 9.6 MG/DL (ref 8.8–10.4)
CHLORIDE SERPL-SCNC: 103 MMOL/L (ref 98–107)
COLOR UR AUTO: ABNORMAL
CREAT SERPL-MCNC: 1.3 MG/DL (ref 0.51–0.95)
EGFRCR SERPLBLD CKD-EPI 2021: 43 ML/MIN/1.73M2
EOSINOPHIL # BLD AUTO: 0.2 10E3/UL (ref 0–0.7)
EOSINOPHIL NFR BLD AUTO: 2 %
ERYTHROCYTE [DISTWIDTH] IN BLOOD BY AUTOMATED COUNT: 12.8 % (ref 10–15)
GLUCOSE SERPL-MCNC: 124 MG/DL (ref 70–99)
GLUCOSE UR STRIP-MCNC: NEGATIVE MG/DL
HCO3 SERPL-SCNC: 24 MMOL/L (ref 22–29)
HCT VFR BLD AUTO: 37.9 % (ref 35–47)
HGB BLD-MCNC: 13.1 G/DL (ref 11.7–15.7)
HGB UR QL STRIP: ABNORMAL
HOLD SPECIMEN: NORMAL
IMM GRANULOCYTES # BLD: 0.1 10E3/UL
IMM GRANULOCYTES NFR BLD: 1 %
KETONES UR STRIP-MCNC: ABNORMAL MG/DL
LEUKOCYTE ESTERASE UR QL STRIP: ABNORMAL
LIPASE SERPL-CCNC: 42 U/L (ref 13–60)
LYMPHOCYTES # BLD AUTO: 1.6 10E3/UL (ref 0.8–5.3)
LYMPHOCYTES NFR BLD AUTO: 15 %
MCH RBC QN AUTO: 31.9 PG (ref 26.5–33)
MCHC RBC AUTO-ENTMCNC: 34.6 G/DL (ref 31.5–36.5)
MCV RBC AUTO: 92 FL (ref 78–100)
MONOCYTES # BLD AUTO: 0.8 10E3/UL (ref 0–1.3)
MONOCYTES NFR BLD AUTO: 8 %
MUCOUS THREADS #/AREA URNS LPF: PRESENT /LPF
NEUTROPHILS # BLD AUTO: 7.7 10E3/UL (ref 1.6–8.3)
NEUTROPHILS NFR BLD AUTO: 74 %
NITRATE UR QL: NEGATIVE
NRBC # BLD AUTO: 0 10E3/UL
NRBC BLD AUTO-RTO: 0 /100
PH UR STRIP: 5.5 [PH] (ref 5–7)
PLATELET # BLD AUTO: 271 10E3/UL (ref 150–450)
POTASSIUM SERPL-SCNC: 4 MMOL/L (ref 3.4–5.3)
PROT SERPL-MCNC: 7.1 G/DL (ref 6.4–8.3)
RBC # BLD AUTO: 4.11 10E6/UL (ref 3.8–5.2)
RBC URINE: 1 /HPF
SODIUM SERPL-SCNC: 140 MMOL/L (ref 135–145)
SP GR UR STRIP: 1.03 (ref 1–1.03)
SQUAMOUS EPITHELIAL: 2 /HPF
TRANSITIONAL EPI: <1 /HPF
UROBILINOGEN UR STRIP-MCNC: <2 MG/DL
WBC # BLD AUTO: 10.3 10E3/UL (ref 4–11)
WBC URINE: 1 /HPF

## 2025-03-21 PROCEDURE — 81001 URINALYSIS AUTO W/SCOPE: CPT | Performed by: EMERGENCY MEDICINE

## 2025-03-21 PROCEDURE — 250N000011 HC RX IP 250 OP 636

## 2025-03-21 PROCEDURE — 99285 EMERGENCY DEPT VISIT HI MDM: CPT | Mod: 25

## 2025-03-21 PROCEDURE — 85025 COMPLETE CBC W/AUTO DIFF WBC: CPT | Performed by: STUDENT IN AN ORGANIZED HEALTH CARE EDUCATION/TRAINING PROGRAM

## 2025-03-21 PROCEDURE — 80053 COMPREHEN METABOLIC PANEL: CPT | Performed by: STUDENT IN AN ORGANIZED HEALTH CARE EDUCATION/TRAINING PROGRAM

## 2025-03-21 PROCEDURE — 250N000011 HC RX IP 250 OP 636: Performed by: STUDENT IN AN ORGANIZED HEALTH CARE EDUCATION/TRAINING PROGRAM

## 2025-03-21 PROCEDURE — 258N000003 HC RX IP 258 OP 636

## 2025-03-21 PROCEDURE — 96374 THER/PROPH/DIAG INJ IV PUSH: CPT | Mod: 59

## 2025-03-21 PROCEDURE — 82248 BILIRUBIN DIRECT: CPT

## 2025-03-21 PROCEDURE — 96361 HYDRATE IV INFUSION ADD-ON: CPT

## 2025-03-21 PROCEDURE — 36415 COLL VENOUS BLD VENIPUNCTURE: CPT | Performed by: STUDENT IN AN ORGANIZED HEALTH CARE EDUCATION/TRAINING PROGRAM

## 2025-03-21 PROCEDURE — 250N000013 HC RX MED GY IP 250 OP 250 PS 637: Performed by: STUDENT IN AN ORGANIZED HEALTH CARE EDUCATION/TRAINING PROGRAM

## 2025-03-21 PROCEDURE — 250N000011 HC RX IP 250 OP 636: Mod: JZ

## 2025-03-21 PROCEDURE — 96375 TX/PRO/DX INJ NEW DRUG ADDON: CPT

## 2025-03-21 PROCEDURE — 74177 CT ABD & PELVIS W/CONTRAST: CPT

## 2025-03-21 PROCEDURE — 83690 ASSAY OF LIPASE: CPT

## 2025-03-21 PROCEDURE — 96376 TX/PRO/DX INJ SAME DRUG ADON: CPT

## 2025-03-21 PROCEDURE — 250N000013 HC RX MED GY IP 250 OP 250 PS 637

## 2025-03-21 RX ORDER — OXYCODONE HYDROCHLORIDE 5 MG/1
5 TABLET ORAL EVERY 4 HOURS PRN
Qty: 12 TABLET | Refills: 0 | Status: SHIPPED | OUTPATIENT
Start: 2025-03-21 | End: 2025-03-25

## 2025-03-21 RX ORDER — KETOROLAC TROMETHAMINE 15 MG/ML
15 INJECTION, SOLUTION INTRAMUSCULAR; INTRAVENOUS ONCE
Status: COMPLETED | OUTPATIENT
Start: 2025-03-21 | End: 2025-03-21

## 2025-03-21 RX ORDER — ONDANSETRON 4 MG/1
4 TABLET, ORALLY DISINTEGRATING ORAL EVERY 8 HOURS PRN
Qty: 12 TABLET | Refills: 0 | Status: SHIPPED | OUTPATIENT
Start: 2025-03-21

## 2025-03-21 RX ORDER — ACETAMINOPHEN 325 MG/1
975 TABLET ORAL ONCE
Status: COMPLETED | OUTPATIENT
Start: 2025-03-21 | End: 2025-03-21

## 2025-03-21 RX ORDER — DIMENHYDRINATE 50 MG
50 TABLET ORAL EVERY 6 HOURS PRN
Qty: 28 TABLET | Refills: 0 | Status: SHIPPED | OUTPATIENT
Start: 2025-03-21 | End: 2025-03-28

## 2025-03-21 RX ORDER — HYDROMORPHONE HYDROCHLORIDE 1 MG/ML
0.5 INJECTION, SOLUTION INTRAMUSCULAR; INTRAVENOUS; SUBCUTANEOUS ONCE
Status: COMPLETED | OUTPATIENT
Start: 2025-03-21 | End: 2025-03-21

## 2025-03-21 RX ORDER — IBUPROFEN 200 MG
400 TABLET ORAL EVERY 6 HOURS
Qty: 56 TABLET | Refills: 0 | Status: SHIPPED | OUTPATIENT
Start: 2025-03-21 | End: 2025-03-28

## 2025-03-21 RX ORDER — ACETAMINOPHEN 500 MG
1000 TABLET ORAL EVERY 6 HOURS
Qty: 56 TABLET | Refills: 0 | Status: SHIPPED | OUTPATIENT
Start: 2025-03-21 | End: 2025-03-28

## 2025-03-21 RX ORDER — ONDANSETRON 4 MG/1
4 TABLET, ORALLY DISINTEGRATING ORAL ONCE
Status: COMPLETED | OUTPATIENT
Start: 2025-03-21 | End: 2025-03-21

## 2025-03-21 RX ORDER — DIMENHYDRINATE 50 MG
50 TABLET ORAL AT BEDTIME
Qty: 7 TABLET | Refills: 0 | Status: SHIPPED | OUTPATIENT
Start: 2025-03-21 | End: 2025-03-28

## 2025-03-21 RX ORDER — OXYCODONE HYDROCHLORIDE 5 MG/1
5 TABLET ORAL ONCE
Status: DISCONTINUED | OUTPATIENT
Start: 2025-03-21 | End: 2025-03-21

## 2025-03-21 RX ORDER — IOPAMIDOL 755 MG/ML
71 INJECTION, SOLUTION INTRAVASCULAR ONCE
Status: COMPLETED | OUTPATIENT
Start: 2025-03-21 | End: 2025-03-21

## 2025-03-21 RX ADMIN — KETOROLAC TROMETHAMINE 15 MG: 15 INJECTION, SOLUTION INTRAMUSCULAR; INTRAVENOUS at 18:19

## 2025-03-21 RX ADMIN — IOPAMIDOL 71 ML: 755 INJECTION, SOLUTION INTRAVENOUS at 19:08

## 2025-03-21 RX ADMIN — ONDANSETRON 4 MG: 4 TABLET, ORALLY DISINTEGRATING ORAL at 17:25

## 2025-03-21 RX ADMIN — HYDROMORPHONE HYDROCHLORIDE 0.5 MG: 1 INJECTION, SOLUTION INTRAMUSCULAR; INTRAVENOUS; SUBCUTANEOUS at 20:10

## 2025-03-21 RX ADMIN — SODIUM CHLORIDE 1000 ML: 0.9 INJECTION, SOLUTION INTRAVENOUS at 18:19

## 2025-03-21 RX ADMIN — HYDROMORPHONE HYDROCHLORIDE 0.5 MG: 1 INJECTION, SOLUTION INTRAMUSCULAR; INTRAVENOUS; SUBCUTANEOUS at 18:19

## 2025-03-21 RX ADMIN — ACETAMINOPHEN 975 MG: 325 TABLET ORAL at 20:09

## 2025-03-21 RX ADMIN — Medication 50 MG: at 17:26

## 2025-03-21 ASSESSMENT — COLUMBIA-SUICIDE SEVERITY RATING SCALE - C-SSRS
1. IN THE PAST MONTH, HAVE YOU WISHED YOU WERE DEAD OR WISHED YOU COULD GO TO SLEEP AND NOT WAKE UP?: NO
6. HAVE YOU EVER DONE ANYTHING, STARTED TO DO ANYTHING, OR PREPARED TO DO ANYTHING TO END YOUR LIFE?: NO
2. HAVE YOU ACTUALLY HAD ANY THOUGHTS OF KILLING YOURSELF IN THE PAST MONTH?: NO

## 2025-03-21 ASSESSMENT — ACTIVITIES OF DAILY LIVING (ADL)
ADLS_ACUITY_SCORE: 46
ADLS_ACUITY_SCORE: 46

## 2025-03-21 NOTE — ED TRIAGE NOTES
Left flank pain starting this morning with nausea. Per pt no hx of kidney stones. No urinary s/s.      Triage Assessment (Adult)       Row Name 03/21/25 5186          Triage Assessment    Airway WDL WDL        Respiratory WDL    Respiratory WDL WDL        Skin Circulation/Temperature WDL    Skin Circulation/Temperature WDL WDL        Cardiac WDL    Cardiac WDL WDL        Peripheral/Neurovascular WDL    Peripheral Neurovascular WDL WDL        Cognitive/Neuro/Behavioral WDL    Cognitive/Neuro/Behavioral WDL WDL

## 2025-03-21 NOTE — ED PROVIDER NOTES
Emergency Department Encounter   NAME: Karen Hernandez  AGE: 75 year old female  YOB: 1950  MRN: [id number]    PCP: Delphine Aguilar  ED PROVIDER: Connie Ballard PA-C    Evaluation Date & Time:   No admission date for patient encounter.    CHIEF COMPLAINT:  Flank Pain      Impression and Plan   MDM: 75-year-old female with a history of hypertension, hyperlipidemia, GERD, and osteopenia presents for evaluation of left-sided flank pain.  Sudden onset this morning associated with nausea.  No vomiting, dysuria, hematuria, melena/medic easier, fever, chills.  History of appendectomy and hysterectomy.  No history of kidney stones.  On arrival here patient is hypertensive at 193/94, but otherwise vitally stable.  Afebrile.  On my exam patient is uncomfortable appearing.  No abdominal tenderness or peritoneal signs.  Isolated left flank tenderness to palpation.  No midline spinal tenderness.  No CVA tenderness.    Differential includes muscle strain, ureteral stone, pyelonephritis.  Not consistent with obstruction, mesenteric ischemia, or acute aortic syndrome. Discussed plans for labs, CT, and symptom control which patient is agreeable to.    Lab work here without any leukocytosis or anemia.  JEFFREY with creatinine of 1.3. Last creatinine 0.84 on 4/29/2024.  Getting IV fluids here.  Highly suspicious for kidney stone and suspect this is likely secondary to postrenal obstruction.  Normal LFTs.  Normal lipase.  UA without evidence of infection.  CT showing of a 5 x 3 x 6 mm stone at the left UVJ.     I reassessed the patient.  She is feeling better after medications here.  We discussed lab and imaging results.  Thankfully no sign of infection of the stone.  We did discuss her JEFFREY.  She got fluids here and I suspect this is likely postrenal and should clear with the passage of her stone.  We did discuss the importance of maintaining adequate hydration and follow-up to get this rechecked.  Patient  feels comfortable going home with symptomatic management.  Placed a referral for [facility].  I prescribed her Tylenol, ibuprofen, Zofran, oxycodone, Dramamine.  We reviewed use and side effects of these medications.  She was provided with a urine strainer.  We did also discuss the incidental pulmonary nodule seen on CT which she will follow-up with her PCP regarding.  We reviewed having a low threshold to come back to the emergency department for fever or uncontrollable pain especially given her stone is on the larger side.  We reviewed strict return precautions, signs of infection, and patient was discharged home in stable condition with her .    I have staffed the patient with Dr. Avila, ED physician, who will evaluate the patient and agrees with all aspects of today's care.              Medical Decision Making  Obtained supplemental history:Supplemental history obtained?: N/A  Reviewed external records: External records reviewed?: Outpatient Record: 3/21/2025 (earlier today) [facility] Nurse Triage call   Care impacted by chronic illness:Hyperlipidemia, Hypertension, and GERD, osteopenia. appendectomy, hysterectomy  Did you consider but not order tests?: Work up considered but not performed and documented in chart, if applicable  Did you interpret images independently?: Independent interpretation of ECG and images noted in documentation, when applicable.  Consultation discussion with other provider:Did you involve another provider (consultant, , pharmacy, etc.)?: No  Discharge. I prescribed additional prescription strength medication(s) as charted. N/A.    MIPS (CTPE, Dental pain, Galindo, Sinusitis, Asthma/COPD, Head Trauma): Not Applicable    SEPSIS: None          FINAL IMPRESSION:    ICD-10-CM    1. Left ureteral stone  N20.1 Adult Urology Referral            MEDICATIONS GIVEN IN THE EMERGENCY DEPARTMENT:  Medications   acetaminophen (TYLENOL) tablet 975 mg (975 mg Oral Not Given 3/21/25  1731)   ondansetron (ZOFRAN ODT) ODT tab 4 mg (4 mg Oral $Given 3/21/25 1725)   dimenhyDRINATE chew tab 50 mg (50 mg Oral $Given 3/21/25 1726)   sodium chloride 0.9% BOLUS 1,000 mL (0 mLs Intravenous Stopped 3/21/25 1920)   ketorolac (TORADOL) injection 15 mg (15 mg Intravenous $Given 3/21/25 1819)   HYDROmorphone (PF) (DILAUDID) injection 0.5 mg (0.5 mg Intravenous $Given 3/21/25 1819)   iopamidol (ISOVUE-370) solution 71 mL (71 mLs Intravenous $Given 3/21/25 1908)   acetaminophen (TYLENOL) tablet 975 mg (975 mg Oral $Given 3/21/25 2009)   HYDROmorphone (PF) (DILAUDID) injection 0.5 mg (0.5 mg Intravenous $Given 3/21/25 2010)         NEW PRESCRIPTIONS STARTED AT TODAY'S ED VISIT:  Discharge Medication List as of 3/21/2025  8:17 PM        START taking these medications    Details   acetaminophen (TYLENOL) 500 MG tablet Take 2 tablets (1,000 mg) by mouth every 6 hours for 7 days., Disp-56 tablet, R-0, Local Print      !! dimenhyDRINATE (DRAMAMINE) 50 MG tablet Take 1 tablet (50 mg) by mouth at bedtime for 7 days., Disp-7 tablet, R-0, Local Print      !! dimenhyDRINATE (DRAMAMINE) 50 MG tablet Take 1 tablet (50 mg) by mouth every 6 hours as needed for other (kidney stone pain management)., Disp-28 tablet, R-0, Local Print      ibuprofen (ADVIL/MOTRIN) 200 MG tablet Take 2 tablets (400 mg) by mouth every 6 hours for 7 days., Disp-56 tablet, R-0, Local Print      ondansetron (ZOFRAN ODT) 4 MG ODT tab Take 1 tablet (4 mg) by mouth every 8 hours as needed for nausea., Disp-12 tablet, R-0, Local Print      oxyCODONE (ROXICODONE) 5 MG tablet Take 1 tablet (5 mg) by mouth every 4 hours as needed for severe pain. If pain is not improved with acetaminophen and ibuprofen., Disp-12 tablet, R-0, Local Print       !! - Potential duplicate medications found. Please discuss with provider.            HPI   Patient information was obtained from: Patient   Use of Intrepreter: N/A     Karen Sequeiradaniel Hernandez is a 75 year old female  with a pertinent history of hypertension, hyperlipidemia, GERD, osteopenia, appendectomy, hysterectomy, who presents to the ED by via walk in for evaluation of left flank pain.     Since a sudden onset this morning (03/21/2025), she has endorsed a consistent severe left sided flank pain with associated nausea. She has taken Ibuprofen and aspirin to manage symptoms with minimal relief. She has been compliant with taking her prescribed medications, most recently last night. She denies fever, chills, dysuria, hematuria, melena, or any other symptoms at this time. Of note, she last ate at 1300 earlier today (~5 hours ago).      Per chart review, 3/21/2025 (earlier today) [facility] Nurse Triage call for evaluation of a severe consistent left sided back and hip pain with associated nausea since his am. She denied loss of bowel/bladder control, recent strenuous work, fever, abdominal pain, dysuria, hematuria, chances of being pregnant, or any other symptoms. Disposition was to the ED for a further evaluation.      REVIEW OF SYSTEMS:  Pertinent positive and negative symptoms per HPI.       Physical Exam     First Vitals:  Patient Vitals for the past 24 hrs:   BP Temp Pulse Resp SpO2   03/21/25 2016 (!) 163/92 -- -- -- --   03/21/25 1844 -- -- 89 -- 97 %   03/21/25 1710 (!) 193/94 -- -- -- --   03/21/25 1709 -- 97.9  F (36.6  C) 97 16 97 %       PHYSICAL EXAM:   General Appearance:  Alert, cooperative, uncomfortable appearing, appears stated age  HENT: Normocephalic without obvious deformity, atraumatic. Mucous membranes moist   Eyes: Conjunctiva clear, Lids normal. No discharge.   Respiratory: No distress. Lungs clear to ausculation bilaterally. No wheezes, rhonchi or stridor  Cardiovascular: Regular rate and rhythm, no murmur. Normal cap refill.   GI: Abdomen soft, nontender, normal bowel sounds.  Isolated left flank tenderness to palpation.  : No CVA tenderness  Musculoskeletal: Moving all  extremities. No gross deformities  Integument: Warm, dry, no rashes or lesions  Neurologic: Alert and orientated x3.  Ambulating with a smooth gait  Psych: Normal mood and affect      Results     LAB:  All pertinent labs reviewed and interpreted  Labs Ordered and Resulted from Time of ED Arrival to Time of ED Departure   ROUTINE UA WITH MICROSCOPIC REFLEX TO CULTURE - Abnormal       Result Value    Color Urine Light Yellow      Appearance Urine Clear      Glucose Urine Negative      Bilirubin Urine Negative      Ketones Urine Trace (*)     Specific Gravity Urine 1.032 (*)     Blood Urine 0.03 mg/dL (*)     pH Urine 5.5      Protein Albumin Urine 20 (*)     Urobilinogen Urine <2.0      Nitrite Urine Negative      Leukocyte Esterase Urine 75 Adrián/uL (*)     Mucus Urine Present (*)     RBC Urine 1      WBC Urine 1      Squamous Epithelials Urine 2 (*)     Transitional Epithelials Urine <1     BASIC METABOLIC PANEL - Abnormal    Sodium 140      Potassium 4.0      Chloride 103      Carbon Dioxide (CO2) 24      Anion Gap 13      Urea Nitrogen 14.2      Creatinine 1.30 (*)     GFR Estimate 43 (*)     Calcium 9.6      Glucose 124 (*)    HEPATIC FUNCTION PANEL - Normal    Protein Total 7.1      Albumin 4.6      Bilirubin Total 0.3      Alkaline Phosphatase 89      AST 31      ALT 27      Bilirubin Direct 0.11     LIPASE - Normal    Lipase 42     CBC WITH PLATELETS AND DIFFERENTIAL    WBC Count 10.3      RBC Count 4.11      Hemoglobin 13.1      Hematocrit 37.9      MCV 92      MCH 31.9      MCHC 34.6      RDW 12.8      Platelet Count 271      % Neutrophils 74      % Lymphocytes 15      % Monocytes 8      % Eosinophils 2      % Basophils 0      % Immature Granulocytes 1      NRBCs per 100 WBC 0      Absolute Neutrophils 7.7      Absolute Lymphocytes 1.6      Absolute Monocytes 0.8      Absolute Eosinophils 0.2      Absolute Basophils 0.0      Absolute Immature Granulocytes 0.1      Absolute NRBCs 0.0         RADIOLOGY:  CT  Abdomen Pelvis w Contrast   Final Result   IMPRESSION:    1.  Mild to moderate left-sided hydroureteronephrosis with a suspected faint 6 mm obstructing stone at the left ureterovesical junction. Moderate left perinephric edema. Recommend laboratory correlation to exclude a superimposed infectious process.   2.  Right middle lobe 3 mm pulmonary nodule. This can be followed per the guidelines below.   3.  Additional chronic findings as described above.      REFERENCE:   Guidelines for Management of Incidental Pulmonary Nodules Detected on CT Images: From the Fleischner Society 2017.    Guidelines apply to incidental nodules in patients who are 35 years or older.   Guidelines do not apply to lung cancer screening, patients with immunosuppression, or patients with known primary cancer.      SINGLE NODULE   Nodule size less than or equal to 6 mm   Low-risk patients: No follow-up needed.   High-risk patients: Optional follow-up at 12 months.                   I, Rogerio Decker, am serving as a scribe to document services personally performed by Connie Ballard PA-C, based on my observation and the provider's statements to me. IConnie PA-C attest that Rogerio Decker is acting in a scribe capacity, has observed my performance of the services and has documented them in accordance with my direction.       Connie Ballard PA-C   Emergency Medicine   [facility] EMERGENCY DEPARTMENT      Connie Ballard PA-C  03/21/25 2034

## 2025-03-21 NOTE — ED PROVIDER NOTES
Emergency Department MARLYS Supervisory Note     I had a face to face encounter with this patient who was also seen by the MARLYS (PA / NP) who is currently serving as a MARLYS provider in the Emergency Department. I have seen, examined, and discussed the patient with the MARLYS and agree with their assessment and plan of management. Please refer to the MARLYS note for further details and ED course.     Brief HPI:     Karen Hernandez is a 75 year old female who presents for evaluation of flank pain that began this morning with associated nausea. Patient denies history of kidney stones or urinary symptoms.        I, Emilne Hernandez, am serving as a scribe to document services personally performed by Segundo Avila M.D., based on my observations and the provider's statements to me.   I, Segundo Avila M.D., attest that Emil Hernandez was acting in a scribe capacity, has observed my performance of the services and has documented them in accordance with my direction.    Brief Review of Systems:  Review of Systems       Brief Physical Exam:  Physical Exam    ED Course/MDM:  Karen Hernandez was staffed with me. I agree with their assessment and plan of management, and I will see the patient.  I met with the patient to introduce myself, gather additional history, perform my initial exam, and discuss the plan.       ***      ED Course as of 03/21/25 1810   Fri Mar 21, 2025   1757 75-year-old female with a history of hypertension, hyperlipidemia, GERD, and osteopenia presents for evaluation of left-sided flank pain.  Sudden onset this morning associated with nausea.  No vomiting, dysuria, hematuria, melena/medic easier, fever, chills.  History of appendectomy and hysterectomy.  No history of kidney stones.  On arrival here patient is hypertensive at 193/94, but otherwise vitally stable.  Afebrile.  On my exam patient is uncomfortable appearing.  No abdominal tenderness or peritoneal signs.  Isolated left flank tenderness  to palpation.  No midline spinal tenderness.  No CVA tenderness.    Differential includes muscle strain, ureteral stone, pyelonephritis.  Not consistent with obstruction, mesenteric ischemia, or acute aortic syndrome. Discussed plans for labs, CT, and symptom control which patient is agreeable to.       No diagnosis found.    Labs and Imaging:  Results for orders placed or performed during the hospital encounter of 03/21/25   Basic metabolic panel   Result Value Ref Range    Sodium 140 135 - 145 mmol/L    Potassium 4.0 3.4 - 5.3 mmol/L    Chloride 103 98 - 107 mmol/L    Carbon Dioxide (CO2) 24 22 - 29 mmol/L    Anion Gap 13 7 - 15 mmol/L    Urea Nitrogen 14.2 8.0 - 23.0 mg/dL    Creatinine 1.30 (H) 0.51 - 0.95 mg/dL    GFR Estimate 43 (L) >60 mL/min/1.73m2    Calcium 9.6 8.8 - 10.4 mg/dL    Glucose 124 (H) 70 - 99 mg/dL   CBC with platelets and differential   Result Value Ref Range    WBC Count 10.3 4.0 - 11.0 10e3/uL    RBC Count 4.11 3.80 - 5.20 10e6/uL    Hemoglobin 13.1 11.7 - 15.7 g/dL    Hematocrit 37.9 35.0 - 47.0 %    MCV 92 78 - 100 fL    MCH 31.9 26.5 - 33.0 pg    MCHC 34.6 31.5 - 36.5 g/dL    RDW 12.8 10.0 - 15.0 %    Platelet Count 271 150 - 450 10e3/uL    % Neutrophils 74 %    % Lymphocytes 15 %    % Monocytes 8 %    % Eosinophils 2 %    % Basophils 0 %    % Immature Granulocytes 1 %    NRBCs per 100 WBC 0 <1 /100    Absolute Neutrophils 7.7 1.6 - 8.3 10e3/uL    Absolute Lymphocytes 1.6 0.8 - 5.3 10e3/uL    Absolute Monocytes 0.8 0.0 - 1.3 10e3/uL    Absolute Eosinophils 0.2 0.0 - 0.7 10e3/uL    Absolute Basophils 0.0 0.0 - 0.2 10e3/uL    Absolute Immature Granulocytes 0.1 <=0.4 10e3/uL    Absolute NRBCs 0.0 10e3/uL     I have reviewed the relevant laboratory and radiology studies      Segundo Avila M.D.   [facility] EMERGENCY DEPARTMENT  [address] [place] [id number] [phone]

## 2025-03-22 NOTE — DISCHARGE INSTRUCTIONS
You were seen in the emergency department for evaluation of left flank pain.  You have a kidney stone.  This will likely pass on its own.  Use the pain medications as prescribed.     You have been prescribed a narcotic pain medication that has risk for addiction with prolonged use, so please use sparingly.  Additionally, narcotics are medications that are sedating (will make you sleepy), so do not drive or operate machinery while taking this medication.  Avoid alcohol or other sedating medicines such as benzodiazepines while taking narcotics due to risk for increased sedation and difficulty breathing.      Narcotics will cause constipation.  If you need to take this medication please consider taking an over-the-counter stool softener and laxative, such as Senna Plus, to prevent constipation from developing.  Nausea is a side effect of narcotic use.  Possible additional side effects include vomiting, itching, and dizziness/lightheadedness.    Follow-up with MERE (kidney stone doctor) as well as your primary care provider.     Return to the emergency department for fever, uncontrollable pain, or any other concerning symptoms.

## 2025-03-31 ENCOUNTER — PATIENT OUTREACH (OUTPATIENT)
Dept: CARE COORDINATION | Facility: CLINIC | Age: 75
End: 2025-03-31
Payer: COMMERCIAL

## 2025-04-07 DIAGNOSIS — K21.00 GASTROESOPHAGEAL REFLUX DISEASE WITH ESOPHAGITIS WITHOUT HEMORRHAGE: ICD-10-CM

## 2025-04-07 RX ORDER — PANTOPRAZOLE SODIUM 40 MG/1
40 TABLET, DELAYED RELEASE ORAL DAILY
Qty: 90 TABLET | Refills: 0 | Status: SHIPPED | OUTPATIENT
Start: 2025-04-07

## 2025-04-14 ENCOUNTER — PATIENT OUTREACH (OUTPATIENT)
Dept: CARE COORDINATION | Facility: CLINIC | Age: 75
End: 2025-04-14
Payer: COMMERCIAL

## 2025-05-06 NOTE — ED NOTES
Bed: [place] [address]  Expected date: 11/13/21  Expected time: 5:16 AM  Means of arrival: Ambulance  Comments:  [facility] - 71F fall - yesterday, right hip pain   Call Center TCM Note      Flowsheet Row Responses   [facility] patient discharged from? [facility]   Does the patient have one of the following disease processes/diagnoses(primary or secondary)? Sepsis   TCM attempt successful? No   Unsuccessful attempts Attempt 3            Rocio Dela Cruz Registered Nurse    5/6/2025, 12:36 EDT

## 2025-05-27 ENCOUNTER — TELEPHONE (OUTPATIENT)
Dept: FAMILY MEDICINE | Facility: CLINIC | Age: 75
End: 2025-05-27
Payer: COMMERCIAL

## 2025-05-27 NOTE — TELEPHONE ENCOUNTER
Patient Quality Outreach    Patient is due for the following:   Physical Annual Wellness Visit    Action(s) Taken:   Schedule a Annual Wellness Visit    Type of outreach:    Sent [employer] message.    Questions for provider review:    None         Brannon Hubbard  Chart routed to None.

## 2025-06-11 DIAGNOSIS — E78.5 HYPERLIPIDEMIA LDL GOAL <130: ICD-10-CM

## 2025-06-11 NOTE — TELEPHONE ENCOUNTER
Requested Prescriptions   Pending Prescriptions Disp Refills    atorvastatin (LIPITOR) 20 MG tablet [Pharmacy Med Name: ATORVASTATIN 20 MG TABLET] 90 tablet 3     Sig: TAKE 1 TABLET BY MOUTH EVERY DAY       Antihyperlipidemic agents Failed - 6/11/2025  1:02 PM        Failed - LDL on file in the past 12 months        Passed - Medication is active on med list and the sig matches. RN to manually verify dose and sig if red X/fail.     If the protocol passes (green check), you do not need to verify med dose and sig.    A prescription matches if they are the same clinical intention.    For Example: once daily and every morning are the same.    The protocol can not identify upper and lower case letters as matching and will fail.     For Example: Take 1 tablet (50 mg) by mouth daily     TAKE 1 TABLET (50 MG) BY MOUTH DAILY    For all fails (red x), verify dose and sig.    If the refill does match what is on file, the RN can still proceed to approve the refill request.       If they do not match, route to the appropriate provider.             Passed - Recent (12 month) or future (90 days) visit with authorizing provider's specialty (provided they have been seen in the past 15 months)     The patient must have completed an in-person or virtual visit within the past 12 months or has a future visit scheduled within the next 90 days with the authorizing provider s specialty.  Urgent care and e-visits do not qualify as an office visit for this protocol.          Passed - Patient is age 18 years or older        Passed - No active pregnancy on record        Passed - No positive pregnancy test in past 12 mos          Julie Behrendt RN

## 2025-06-12 RX ORDER — ATORVASTATIN CALCIUM 20 MG/1
20 TABLET, FILM COATED ORAL DAILY
Qty: 90 TABLET | Refills: 3 | Status: SHIPPED | OUTPATIENT
Start: 2025-06-12

## 2025-06-14 ENCOUNTER — HEALTH MAINTENANCE LETTER (OUTPATIENT)
Age: 75
End: 2025-06-14

## 2025-06-16 ENCOUNTER — OFFICE VISIT (OUTPATIENT)
Dept: DERMATOLOGY | Facility: CLINIC | Age: 75
End: 2025-06-16
Payer: COMMERCIAL

## 2025-06-16 DIAGNOSIS — Z12.83 SKIN CANCER SCREENING: ICD-10-CM

## 2025-06-16 DIAGNOSIS — D22.9 MULTIPLE BENIGN NEVI: Primary | ICD-10-CM

## 2025-06-16 DIAGNOSIS — L82.1 SEBORRHEIC KERATOSES: ICD-10-CM

## 2025-06-16 DIAGNOSIS — D18.01 CHERRY ANGIOMA: ICD-10-CM

## 2025-06-16 DIAGNOSIS — L82.0 INFLAMED SEBORRHEIC KERATOSIS: ICD-10-CM

## 2025-06-16 NOTE — LETTER
6/16/2025      Karen Hernandez  [address] [place] [id number]      Dear Colleague,    Thank you for referring your patient, Karen Hernandez, to the [facility]. Please see a copy of my visit note below.    [facility] Dermatology Note  Encounter Date: Jun 16, 2025  Office Visit     Reviewed patients past medical history and pertinent chart review prior to patients visit today.     Dermatology Problem List:  Last skin check: 6/16/2025    1.  Inflamed seborrheic keratoses, status post cryotherapy    Personal Hx: no personal history of skin cancer  Family Hx: Daughters x 2, history of skin cancer, possibly malignant melanoma    ____________________________________________    CC: Skin Check    HPI:  Ms. Karen Hernandez is a(n) 75 year old female who presents today as a return patient for a full body skin cancer screening.  The patient is requesting cryotherapy of several raised, bothersome lesions involving her chest that often become irritated with clothing and her bra.  She also notes a new blue lesion involving the left upper chest.  This is asymptomatic.  The patient tries to be diligent with photoprotection.    Patient is otherwise feeling well, without additional skin concerns.    Medications:  Current Outpatient Medications   Medication Sig Dispense Refill     atorvastatin (LIPITOR) 20 MG tablet TAKE 1 TABLET BY MOUTH EVERY DAY 90 tablet 3     clobetasol (TEMOVATE) 0.05 % external cream Apply topically 2 times daily On affected areas on legs for 2 weeks then stop for 1 week 60 g 1     ketoconazole (NIZORAL) 2 % external shampoo Apply topically daily as needed for itching or irritation 120 mL 1     losartan (COZAAR) 50 MG tablet Take 1 tablet (50 mg) by mouth daily 90 tablet 3     ondansetron (ZOFRAN ODT) 4 MG ODT tab Take 1 tablet (4 mg) by mouth every 8 hours as needed for nausea. 12 tablet 0     pantoprazole (PROTONIX) 40 MG EC tablet  TAKE 1 TABLET BY MOUTH EVERY DAY 90 tablet 0     valACYclovir (VALTREX) 1000 mg tablet Take 2 tablets (2,000 mg) by mouth 2 times daily. Dispense as written 60 tablet 1     No current facility-administered medications for this visit.      Past Medical History:   Patient Active Problem List   Diagnosis     FHX COLON CANCER - SCOPE Q 5Y     Hyperlipidemia LDL goal <130     AK (actinic keratosis)     Melanocytic nevus     Recurrent cold sores     Herpes simplex of eye     24 hour contact handout given     Loss of height     Dermatitis     Gastroesophageal reflux disease with esophagitis     Essential hypertension with goal blood pressure less than 140/90     Osteopenia     Elevated glucose     Keratosis, seborrheic     Fall     Exposure to bat without known bite     Past Medical History:   Diagnosis Date     AK (actinic keratosis) 4/14/2011     FHX COLON CANCER - SCOPE Q 5Y 5/17/2007 2007 negative.      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  Recheck 3-6mo.       Moles 4/14/2011     NO ACTIVE PROBLEMS        _________________________________________     Physical Exam:  Vitals: LMP  (LMP Unknown)    SKIN: Total skin excluding the genitalia areas was performed. The exam included the scalp, face, neck, bilateral arms, chest, back, abdomen, bilateral legs, digits, mons pubis, buttocks, and nails.   - Maguire II.  - Multiple tan/brown macules and papules scattered throughout exam, consistent with benign nevi, many of which were examined under dermoscopy with no concerning features found  - Scattered tan, homogenous macules on sun exposed skin, consistent with solar lentigines  - Scattered waxy, stuck on appearing papules and patches, consistent with seborrheic keratoses  - Several 1-2 mm red dome shaped symmetric papules, consistent with cherry angiomas  -Upper chest x 10,  inframammary areas x 7, and mid chest x 3, tan to brown, waxy, stuck on appearing irritated papules consistent with inflamed seborrheic keratoses  - Left upper chest, bluish compressible papule    - No other lesions of concern on areas examined    _________________________________________    Assessment & Plan:   # Nevi, trunk and extremities  # Solar lentigines  - Nevi demonstrate no concerning features on dermoscopy. We discussed the importance of self exams at home.   - ABCDEs: Counseled ABCDEs of melanoma: Asymmetry, Border (irregularity), Color (not uniform, changes in color), Diameter (greater than 6 mm which is about the size of a pencil eraser), and Evolving (any changes in preexisting moles).  - Sun protection: Counseled SPF 30+ sunscreen, UPF clothing, sun avoidance, tanning bed avoidance.    # Cherry angiomas  # Seborrheic keratoses  - We discussed the benign nature of the skin lesions. No treatment required. Continued observation recommended. Follow up with any concerns or changes.      # Inflamed seborrheic keratosis, Upper chest x 10, inframammary areas x 7, and mid chest x 3   Discussed treatment options with patient including no treatment vs cyrotherapy. Patient prefers cryotherapy today due to irritation and itching. After verbal consent and discussion of risks and benefits including but no limited to dyspigmentation/scar, blister, and pain, 20 inflamed seborrheic keratoses was(were) treated with 1-2mm freeze border for 2 cycles with liquid nitrogen. Post cryotherapy instructions were provided.     # Bluish compressible papule, left upper chest  This lesion appears vascular in nature upon examination today.  I do recommend close continued observation at this time.  The patient was in agreement with the plan.  She should return for reassessment if changes or symptoms would develop.        Follow-up:  1 year(s) for follow up full body skin exam, prn for new or changing lesions or new concerns    All  risks, benefits and alternatives were discussed with patient.  Patient is in agreement and understands the assessment and plan.  All questions were answered.    Ann Alford PA-C  [facility]     Leona Cage PA-C  [address] [place] [address] on close of this encounter.    Again, thank you for allowing me to participate in the care of your patient.        Sincerely,        Ann Alford PA-C    Electronically signed

## 2025-06-16 NOTE — PROGRESS NOTES
[facility] Dermatology Note  Encounter Date: Jun 16, 2025  Office Visit     Reviewed patients past medical history and pertinent chart review prior to patients visit today.     Dermatology Problem List:  Last skin check: 6/16/2025    1.  Inflamed seborrheic keratoses, status post cryotherapy    Personal Hx: no personal history of skin cancer  Family Hx: Daughters x 2, history of skin cancer, possibly malignant melanoma    ____________________________________________    CC: Skin Check    HPI:  Ms. Karen Hernandez is a(n) 75 year old female who presents today as a return patient for a full body skin cancer screening.  The patient is requesting cryotherapy of several raised, bothersome lesions involving her chest that often become irritated with clothing and her bra.  She also notes a new blue lesion involving the left upper chest.  This is asymptomatic.  The patient tries to be diligent with photoprotection.    Patient is otherwise feeling well, without additional skin concerns.    Medications:  Current Outpatient Medications   Medication Sig Dispense Refill    atorvastatin (LIPITOR) 20 MG tablet TAKE 1 TABLET BY MOUTH EVERY DAY 90 tablet 3    clobetasol (TEMOVATE) 0.05 % external cream Apply topically 2 times daily On affected areas on legs for 2 weeks then stop for 1 week 60 g 1    ketoconazole (NIZORAL) 2 % external shampoo Apply topically daily as needed for itching or irritation 120 mL 1    losartan (COZAAR) 50 MG tablet Take 1 tablet (50 mg) by mouth daily 90 tablet 3    ondansetron (ZOFRAN ODT) 4 MG ODT tab Take 1 tablet (4 mg) by mouth every 8 hours as needed for nausea. 12 tablet 0    pantoprazole (PROTONIX) 40 MG EC tablet TAKE 1 TABLET BY MOUTH EVERY DAY 90 tablet 0    valACYclovir (VALTREX) 1000 mg tablet Take 2 tablets (2,000 mg) by mouth 2 times daily. Dispense as written 60 tablet 1     No current facility-administered medications for this visit.      Past Medical History:    Patient Active Problem List   Diagnosis    FHX COLON CANCER - SCOPE Q 5Y    Hyperlipidemia LDL goal <130    AK (actinic keratosis)    Melanocytic nevus    Recurrent cold sores    Herpes simplex of eye    24 hour contact handout given    Loss of height    Dermatitis    Gastroesophageal reflux disease with esophagitis    Essential hypertension with goal blood pressure less than 140/90    Osteopenia    Elevated glucose    Keratosis, seborrheic    Fall    Exposure to bat without known bite     Past Medical History:   Diagnosis Date    AK (actinic keratosis) 4/14/2011    FHX COLON CANCER - SCOPE Q 5Y 5/17/2007 2007 negative.     GERD (gastroesophageal reflux disease) 5/20/2011    Herpes simplex of eye 5/20/2011    Hyperlipidemia LDL goal <130 9/29/2008 September 29, 2008 - Desires lifestyle initially.  Recheck 3-6mo.      Moles 4/14/2011    NO ACTIVE PROBLEMS        _________________________________________     Physical Exam:  Vitals: LMP  (LMP Unknown)    SKIN: Total skin excluding the genitalia areas was performed. The exam included the scalp, face, neck, bilateral arms, chest, back, abdomen, bilateral legs, digits, mons pubis, buttocks, and nails.   - Maguire II.  - Multiple tan/brown macules and papules scattered throughout exam, consistent with benign nevi, many of which were examined under dermoscopy with no concerning features found  - Scattered tan, homogenous macules on sun exposed skin, consistent with solar lentigines  - Scattered waxy, stuck on appearing papules and patches, consistent with seborrheic keratoses  - Several 1-2 mm red dome shaped symmetric papules, consistent with cherry angiomas  -Upper chest x 10, inframammary areas x 7, and mid chest x 3, tan to brown, waxy, stuck on appearing irritated papules consistent with inflamed seborrheic keratoses  - Left upper chest, bluish compressible papule    - No other lesions of concern on areas  examined    _________________________________________    Assessment & Plan:   # Nevi, trunk and extremities  # Solar lentigines  - Nevi demonstrate no concerning features on dermoscopy. We discussed the importance of self exams at home.   - ABCDEs: Counseled ABCDEs of melanoma: Asymmetry, Border (irregularity), Color (not uniform, changes in color), Diameter (greater than 6 mm which is about the size of a pencil eraser), and Evolving (any changes in preexisting moles).  - Sun protection: Counseled SPF 30+ sunscreen, UPF clothing, sun avoidance, tanning bed avoidance.    # Cherry angiomas  # Seborrheic keratoses  - We discussed the benign nature of the skin lesions. No treatment required. Continued observation recommended. Follow up with any concerns or changes.      # Inflamed seborrheic keratosis, Upper chest x 10, inframammary areas x 7, and mid chest x 3   Discussed treatment options with patient including no treatment vs cyrotherapy. Patient prefers cryotherapy today due to irritation and itching. After verbal consent and discussion of risks and benefits including but no limited to dyspigmentation/scar, blister, and pain, 20 inflamed seborrheic keratoses was(were) treated with 1-2mm freeze border for 2 cycles with liquid nitrogen. Post cryotherapy instructions were provided.     # Bluish compressible papule, left upper chest  This lesion appears vascular in nature upon examination today.  I do recommend close continued observation at this time.  The patient was in agreement with the plan.  She should return for reassessment if changes or symptoms would develop.        Follow-up:  1 year(s) for follow up full body skin exam, prn for new or changing lesions or new concerns    All risks, benefits and alternatives were discussed with patient.  Patient is in agreement and understands the assessment and plan.  All questions were answered.    Ann Alford PA-C  [facility]    CC Leona Cage,  DEMIAN  [address] [place] [address] on close of this encounter.

## 2025-06-25 DIAGNOSIS — I10 ESSENTIAL HYPERTENSION WITH GOAL BLOOD PRESSURE LESS THAN 140/90: ICD-10-CM

## 2025-06-26 RX ORDER — LOSARTAN POTASSIUM 50 MG/1
50 TABLET ORAL DAILY
Qty: 90 TABLET | Refills: 3 | Status: SHIPPED | OUTPATIENT
Start: 2025-06-26

## 2025-07-03 DIAGNOSIS — K21.00 GASTROESOPHAGEAL REFLUX DISEASE WITH ESOPHAGITIS WITHOUT HEMORRHAGE: ICD-10-CM

## 2025-07-03 RX ORDER — PANTOPRAZOLE SODIUM 40 MG/1
40 TABLET, DELAYED RELEASE ORAL DAILY
Qty: 90 TABLET | Refills: 0 | Status: SHIPPED | OUTPATIENT
Start: 2025-07-03

## 2025-07-14 ENCOUNTER — OFFICE VISIT (OUTPATIENT)
Dept: PEDIATRICS | Facility: CLINIC | Age: 75
End: 2025-07-14
Payer: COMMERCIAL

## 2025-07-14 ENCOUNTER — NURSE TRIAGE (OUTPATIENT)
Dept: FAMILY MEDICINE | Facility: CLINIC | Age: 75
End: 2025-07-14

## 2025-07-14 VITALS
TEMPERATURE: 97.9 F | SYSTOLIC BLOOD PRESSURE: 170 MMHG | WEIGHT: 142 LBS | RESPIRATION RATE: 18 BRPM | DIASTOLIC BLOOD PRESSURE: 88 MMHG | HEART RATE: 78 BPM | BODY MASS INDEX: 24.37 KG/M2 | OXYGEN SATURATION: 97 %

## 2025-07-14 DIAGNOSIS — I10 ESSENTIAL HYPERTENSION WITH GOAL BLOOD PRESSURE LESS THAN 140/90: ICD-10-CM

## 2025-07-14 DIAGNOSIS — M54.16 LUMBAR RADICULOPATHY: Primary | ICD-10-CM

## 2025-07-14 PROCEDURE — 99213 OFFICE O/P EST LOW 20 MIN: CPT | Performed by: NURSE PRACTITIONER

## 2025-07-14 PROCEDURE — 3079F DIAST BP 80-89 MM HG: CPT | Performed by: NURSE PRACTITIONER

## 2025-07-14 PROCEDURE — 1125F AMNT PAIN NOTED PAIN PRSNT: CPT | Performed by: NURSE PRACTITIONER

## 2025-07-14 PROCEDURE — 3077F SYST BP >= 140 MM HG: CPT | Performed by: NURSE PRACTITIONER

## 2025-07-14 RX ORDER — TIZANIDINE 2 MG/1
2-4 TABLET ORAL 3 TIMES DAILY
Qty: 30 TABLET | Refills: 1 | Status: SHIPPED | OUTPATIENT
Start: 2025-07-14

## 2025-07-14 RX ORDER — PREDNISONE 20 MG/1
40 TABLET ORAL DAILY
Qty: 10 TABLET | Refills: 0 | Status: SHIPPED | OUTPATIENT
Start: 2025-07-14 | End: 2025-07-19

## 2025-07-14 ASSESSMENT — PAIN SCALES - GENERAL: PAINLEVEL_OUTOF10: SEVERE PAIN (7)

## 2025-07-14 NOTE — PROGRESS NOTES
Acute [facility] Diagnostic Services Clinic Visit    Assessment & Plan     Lumbar radiculopathy  No chronic steroid use, no recent trauma, no IVDU, no history of malignancy, no focal neurological deficit, no saddle anesthesia, incontinence or bowel or bladder. Suspect this is radicular pain, most likely S1 pattern. Given that exam is reassuring, and pain has only been present about a week, emergent imaging is not indicated. We will try a short course of prednisone, tizanidine as needed, PT eval. Discussed if new or worsened symptoms develop, she is to be seen again right away, otherwise PCP follow up for consideration of MRI if no improvement with conservative management over 4-6 weeks.   - predniSONE (DELTASONE) 20 MG tablet; Take 2 tablets (40 mg) by mouth daily for 5 days.  - tiZANidine (ZANAFLEX) 2 MG tablet; Take 1-2 tablets (2-4 mg) by mouth 3 times daily.  - Physical Therapy  Referral; Future    Essential hypertension with goal blood pressure less than 140/90  Elevated today, however significant pain. Monitor at home, PCP follow up if not improving.      Follow-up  Return in about 2 weeks (around 7/28/2025) for worsening or continued symptoms.    Snow Jones is a 75 year old, presenting for the following health issues:  Extremity Weakness    HPI      Musculoskeletal problem/pain  Onset/Duration: a week and a half  Description:       Location: both legs, left worse than right       Joint swelling: no        Redness: no        Pain: moderate, severe       Warmth: no   Progression of symptoms worse  Accompanying signs and symptoms:       Fevers: no        Numbness/tingling/weakness: YES  History        Trauma to the area: YES- broke 2 vertebrae a couple years ago        Previous history of Gout: no         Alcohol usage: no         Diuretic use: no         Recent illness: no         Previous similar problem: no         Previous evaluation: no   Aggravating factors include: standing, walking, climbing  stairs, exercise, and overuse  Therapies tried and outcome: nothing  Have you had any surgeries on your arteries of veins: No    Pain to left buttock, now radiating to posterior calf. Mild tingling of plantar surface of foot which started yesterday, but thinks she has this to both feet. No LE weakness. No saddle anesthesia. No loss bowel or bladder control. No significant back pain. No fevers, no abd pain.    Review of Systems  Constitutional, HEENT, cardiovascular, pulmonary, GI, , musculoskeletal, neuro, skin, endocrine and psych systems are negative, except as otherwise noted.      Objective    BP (!) 177/95   Pulse 78   Temp 97.9  F (36.6  C) (Tympanic)   Resp 18   Wt 64.4 kg (142 lb)   LMP  (LMP Unknown)   SpO2 97%   BMI 24.37 kg/m    Body mass index is 24.37 kg/m .  Physical Exam   General Appearance:  Alert, cooperative, no distress, appears stated age. Afebrile. Appears comfortable sitting in chair. Rises from seated position without difficulty.  Integument: Warm, dry, no rashes or lesions.  HEENT: Atraumatic, normocephalic. Face nontraumatic. Conjunctiva clear, Lids normal.  Neck: Supple, no meningismus. No Cspine tenderness. Neck ROM intact.  Respiratory: No distress.   Cardiovascular: Regular rate   Abdomen: soft, nontender, non-distended. No masses. No CVAT.  Musculoskeletal: Back: No Lspine or Tspine tenderness or crepitus to palpation. No TTP lumbar paraspinal musculature. Strength testing: hip flexion, extension 5/5 bilaterally, knee flexion/extension 5/5 bilaterally, ankle plantar/dorsiflexion 5/5 bilaterally.  Straight leg raise positive, left. Gait: observed, no antalgia or abnormalities. Steady gait. 2+ bilateral pedal pulses.  Normal toe raise, heel walk. Normal flexion and extension of toes.  Neurologic: Alert and orientated appropriately. No focal deficits. Sensation intact in distal LEs. DTRs: patellar 2+ bilaterally, achilles 2+ bilaterally.  Psych: Normal mood and affect.               Signed Electronically by: ABA Aragon CNP

## 2025-07-14 NOTE — TELEPHONE ENCOUNTER
"Patient is calling to report pain in her left buttocks that radiates to her leg and to calf. She has noted some swelling. No redness. She also has pain in her right buttock/leg. Reports that she is able to walk but is having difficulty due to pain. She feels that due to walking with uneven gait it is also causing discomfort in her right side. Patient was triage to [facility] as patient has noted swelling in one leg only. Patient was scheduled.    Bella Betancourt RN  [facility]        Reason for Disposition   Thigh, calf, or ankle swelling in only one leg    Additional Information   Negative: Looks like a broken bone or dislocated joint (e.g., crooked or deformed)   Negative: Sounds like a life-threatening emergency to the triager   Negative: Followed a hip injury   Negative: Followed a knee injury   Negative: Followed an ankle injury   Negative: Back pain radiating (shooting) into leg(s)   Negative: Foot pain is main symptom   Negative: Ankle pain is main symptom   Negative: Knee pain is main symptom   Negative: Leg swelling is main symptom   Negative: Chest pain   Negative: Difficulty breathing   Negative: Unable to walk   Negative: Entire foot is cool or blue in comparison to other side   Negative: Fever and red area (or area very tender to touch)   Negative: Swollen joint and fever   Negative: Thigh or calf pain in only one leg and present > 1 hour    Answer Assessment - Initial Assessment Questions  1. ONSET: \"When did the pain start?\"       1 week ago  2. LOCATION: \"Where is the pain located?\"      Left buttocks to leg and calf. Right side is also getting painful.  3. PAIN: \"How bad is the pain?\"    (Scale 1-10; or mild, moderate, severe)      Left side with walking 8/10, sitting 2-3/10, Right side with walking 4-5/10, sitting 0/10  4. WORK OR EXERCISE: \"Has there been any recent work or exercise that involved this part of the body?\"       No  5. CAUSE: \"What do you think is causing the leg pain?\"      " "Sciatic but family did not think that it was the cause.  6. OTHER SYMPTOMS: \"Do you have any other symptoms?\" (e.g., chest pain, back pain, breathing difficulty, swelling, rash, fever, numbness, weakness)      Denies chest pain, back, breathing difficulty breathing, swelling, rash or fever. Left calf may be a little swollen. Numbness of both feet. More in left.   7. PREGNANCY: \"Is there any chance you are pregnant?\" \"When was your last menstrual period?\"      na    Protocols used: Leg Pain-A-[place]    "

## 2025-07-14 NOTE — PATIENT INSTRUCTIONS
I have prescribed you a short course of Prednisone to help decrease the inflammation in the area of the spasm. This should help to decrease associated numbness/tingling (nerve pain). Please take as directed. Be aware that this medication can cause insomnia, excitability, excessive hunger.  Please take it in the morning with food.    Do not take ibuprofen while on the steroid. If having pain, take Tylenol up to 1000mg, 4 times daily.    You may use the Zanaflex as needed for muscle spasm. Use caution while taking this medication, as it can make you drowsy. Do not take while driving, operating heavy machinery, or doing any activities requiring intense concentration.    Try using a heating pad and/or warm baths.    Make sure to keep moving to avoid getting stiff. Continue your exercises, and I did put in a PT referral.    If you develop fever, severe pain that prevents you from walking at all, weakness of your arms or legs, loss of bowel or bladder continence, or any other new concerning symptoms, go to the ER immediately.    Otherwise, follow up with primary care doctor as needed or if no improvement in pain in symptoms in 1 week.

## 2025-07-16 ENCOUNTER — THERAPY VISIT (OUTPATIENT)
Age: 75
End: 2025-07-16
Attending: NURSE PRACTITIONER
Payer: COMMERCIAL

## 2025-07-16 DIAGNOSIS — M54.16 LUMBAR RADICULOPATHY: ICD-10-CM

## 2025-07-16 PROCEDURE — 97110 THERAPEUTIC EXERCISES: CPT | Mod: GP | Performed by: PHYSICAL THERAPIST

## 2025-07-16 PROCEDURE — 97140 MANUAL THERAPY 1/> REGIONS: CPT | Mod: GP | Performed by: PHYSICAL THERAPIST

## 2025-07-16 PROCEDURE — 97161 PT EVAL LOW COMPLEX 20 MIN: CPT | Mod: GP | Performed by: PHYSICAL THERAPIST

## 2025-07-16 NOTE — PROGRESS NOTES
PHYSICAL THERAPY EVALUATION  Type of Visit: Evaluation       Fall Risk Screen:  Have you fallen 2 or more times in the past year?: No  Have you fallen and had an injury in the past year?: No  Is patient receiving Physical Therapy Services?: Yes    Subjective         Presenting condition or subjective complaint:   Sciatica x 2-3 weeks.  L > R. Laying down seems to be ok. Taking preednisone, day 3/5.   Did also try muscle relaxer but noted side effects.   Worse with walking.   Date of onset: 07/02/25    Relevant medical history:   Reports broke 2 vertebrae 3 yrs ago  Dates & types of surgery:      Prior diagnostic imaging/testing results:       Prior therapy history for the same diagnosis, illness or injury:        Prior Level of Function  Transfers:   Ambulation:   ADL:   IADL:     Living Environment  Social support:     Type of home:     Stairs to enter the home:         Ramp:     Stairs inside the home:         Help at home:    Equipment owned:       Employment:      Hobbies/Interests:      Patient goals for therapy:      Pain assessment: See objective evaluation for additional pain details     Objective   LUMBAR SPINE EVALUATION  PAIN:  L > R buttocks, some into calf.  Slight/intermittent tingling in toes, (B)  INTEGUMENTARY (edema, incisions):   POSTURE: WFL  GAIT:   Weightbearing Status:   Assistive Device(s):   Gait Deviations:   BALANCE/PROPRIOCEPTION:   WEIGHTBEARING ALIGNMENT:   NON-WEIGHTBEARING ALIGNMENT:    ROM:   Flexion to ankle ( + pain LLE)  Extension mid restriction  SB WFL    + quadrant L   PELVIC/SI SCREEN:   STRENGTH:     MYOTOMES: WNL  DTR S:   CORD SIGNS:   DERMATOMES: WNL  NEURAL TENSION: + SLR L  FLEXIBILITY: decreased hamstring  LUMBAR/HIP Special Tests:    PELVIS/SI SPECIAL TESTS:   FUNCTIONAL TESTS:   PALPATION: ttp L piriformis  SPINAL SEGMENTAL CONCLUSIONS:       Assessment & Plan   CLINICAL IMPRESSIONS  Medical Diagnosis: Lumbar radiculopathy (M54.16)    Treatment Diagnosis: Lumbar  radiculopathy (M54.16)   Impression/Assessment: Patient is a 75 year old female with LLE pain secondary to lumbar radiculopathy complaints.  The following significant findings have been identified: Pain, Decreased ROM/flexibility, Decreased joint mobility, Decreased strength, Impaired muscle performance, Decreased activity tolerance, and Impaired posture. These impairments interfere with their ability to perform self care tasks, work tasks, recreational activities, household chores, driving , household mobility, and community mobility as compared to previous level of function.     Clinical Decision Making (Complexity):  Clinical Presentation: Stable/Uncomplicated  Clinical Presentation Rationale: based on medical and personal factors listed in PT evaluation  Clinical Decision Making (Complexity): Low complexity    PLAN OF CARE  Treatment Interventions:  Interventions: Manual Therapy, Neuromuscular Re-education, Therapeutic Activity, Therapeutic Exercise, Self-Care/Home Management    Long Term Goals     PT Goal 1  Goal Identifier: 1  Goal Description: Pt will be able to walk 10 minutes without increased symptoms  Rationale: to maximize safety and independence with performance of ADLs and functional tasks;to maximize safety and independence within the home;to maximize safety and independence with transportation  Target Date: 09/10/25  PT Goal 2  Goal Identifier: 2  Goal Description: Pt will be able to stand 5 miutes without increased symptoms  Rationale: to maximize safety and independence with performance of ADLs and functional tasks;to maximize safety and independence within the home;to maximize safety and independence with self cares  Target Date: 08/27/25      Frequency of Treatment: 1x/week  Duration of Treatment: 8 wks    Recommended Referrals to Other Professionals:   Education Assessment:        Risks and benefits of evaluation/treatment have been explained.   Patient/Family/caregiver agrees with Plan of Care.      Evaluation Time:     PT Eval, Low Complexity Minutes (06312): 18       Signing Clinician: SPEEDY Evans [facility]                                                                                   OUTPATIENT PHYSICAL THERAPY      PLAN OF TREATMENT FOR OUTPATIENT REHABILITATION   Patient's Last Name, First Name, Karen Perry YOB: 1950   Provider's Name   [facility]   Medical Record No.  [id number]     Onset Date: 07/02/25  Start of Care Date: 07/16/25     Medical Diagnosis:  Lumbar radiculopathy (M54.16)      PT Treatment Diagnosis:  Lumbar radiculopathy (M54.16) Plan of Treatment  Frequency/Duration: 1x/week/ 8 wks    Certification date from 07/16/25 to 09/10/25         See note for plan of treatment details and functional goals     Hallie Olivas, PT                         I CERTIFY THE NEED FOR THESE SERVICES FURNISHED UNDER        THIS PLAN OF TREATMENT AND WHILE UNDER MY CARE     (Physician attestation of this document indicates review and certification of the therapy plan).              Referring Provider:  Charlotte Potter    Initial Assessment  See Epic Evaluation- Start of Care Date: 07/16/25